# Patient Record
Sex: MALE | Race: WHITE
[De-identification: names, ages, dates, MRNs, and addresses within clinical notes are randomized per-mention and may not be internally consistent; named-entity substitution may affect disease eponyms.]

---

## 2017-09-20 ENCOUNTER — HOSPITAL ENCOUNTER (OUTPATIENT)
Dept: HOSPITAL 47 - EC | Age: 52
Setting detail: OBSERVATION
Discharge: LEFT BEFORE DISCHARGE | End: 2017-09-20
Attending: INTERNAL MEDICINE | Admitting: INTERNAL MEDICINE
Payer: COMMERCIAL

## 2017-09-20 VITALS
TEMPERATURE: 98.3 F | SYSTOLIC BLOOD PRESSURE: 142 MMHG | DIASTOLIC BLOOD PRESSURE: 88 MMHG | RESPIRATION RATE: 16 BRPM | HEART RATE: 98 BPM

## 2017-09-20 DIAGNOSIS — F17.210: ICD-10-CM

## 2017-09-20 DIAGNOSIS — J45.909: Primary | ICD-10-CM

## 2017-09-20 DIAGNOSIS — J20.9: ICD-10-CM

## 2017-09-20 LAB
ALP SERPL-CCNC: 79 U/L (ref 38–126)
ALT SERPL-CCNC: 55 U/L (ref 21–72)
ANION GAP SERPL CALC-SCNC: 10 MMOL/L
APTT BLD: 24.5 SEC (ref 22–30)
AST SERPL-CCNC: 61 U/L (ref 17–59)
BASOPHILS # BLD AUTO: 0 K/UL (ref 0–0.2)
BASOPHILS NFR BLD AUTO: 0 %
BUN SERPL-SCNC: 9 MG/DL (ref 9–20)
CALCIUM SPEC-MCNC: 9.6 MG/DL (ref 8.4–10.2)
CH: 33.1
CHCM: 35.4
CHLORIDE SERPL-SCNC: 104 MMOL/L (ref 98–107)
CK SERPL-CCNC: 475 U/L (ref 55–170)
CO2 SERPL-SCNC: 26 MMOL/L (ref 22–30)
EOSINOPHIL # BLD AUTO: 0.2 K/UL (ref 0–0.7)
EOSINOPHIL NFR BLD AUTO: 3 %
ERYTHROCYTE [DISTWIDTH] IN BLOOD BY AUTOMATED COUNT: 5.18 M/UL (ref 4.3–5.9)
ERYTHROCYTE [DISTWIDTH] IN BLOOD: 13.3 % (ref 11.5–15.5)
GLUCOSE SERPL-MCNC: 95 MG/DL (ref 74–99)
HCT VFR BLD AUTO: 48.6 % (ref 39–53)
HDW: 2.35
HGB BLD-MCNC: 17 GM/DL (ref 13–17.5)
INR PPP: 0.9 (ref ?–1.2)
LUC NFR BLD AUTO: 1 %
LYMPHOCYTES # SPEC AUTO: 0.6 K/UL (ref 1–4.8)
LYMPHOCYTES NFR SPEC AUTO: 9 %
MCH RBC QN AUTO: 32.9 PG (ref 25–35)
MCHC RBC AUTO-ENTMCNC: 35 G/DL (ref 31–37)
MCV RBC AUTO: 93.9 FL (ref 80–100)
MONOCYTES # BLD AUTO: 0.4 K/UL (ref 0–1)
MONOCYTES NFR BLD AUTO: 6 %
NEUTROPHILS # BLD AUTO: 5.6 K/UL (ref 1.3–7.7)
NEUTROPHILS NFR BLD AUTO: 80 %
NON-AFRICAN AMERICAN GFR(MDRD): >60
POTASSIUM SERPL-SCNC: 4.4 MMOL/L (ref 3.5–5.1)
PROT SERPL-MCNC: 7.4 G/DL (ref 6.3–8.2)
PT BLD: 9.5 SEC (ref 9–12)
SODIUM SERPL-SCNC: 140 MMOL/L (ref 137–145)
TROPONIN I SERPL-MCNC: <0.012 NG/ML (ref 0–0.03)
WBC # BLD AUTO: 0.09 10*3/UL
WBC # BLD AUTO: 7 K/UL (ref 3.8–10.6)
WBC (PEROX): 7.28

## 2017-09-20 PROCEDURE — 84484 ASSAY OF TROPONIN QUANT: CPT

## 2017-09-20 PROCEDURE — 87040 BLOOD CULTURE FOR BACTERIA: CPT

## 2017-09-20 PROCEDURE — 94640 AIRWAY INHALATION TREATMENT: CPT

## 2017-09-20 PROCEDURE — 80053 COMPREHEN METABOLIC PANEL: CPT

## 2017-09-20 PROCEDURE — 99285 EMERGENCY DEPT VISIT HI MDM: CPT

## 2017-09-20 PROCEDURE — 83880 ASSAY OF NATRIURETIC PEPTIDE: CPT

## 2017-09-20 PROCEDURE — 82550 ASSAY OF CK (CPK): CPT

## 2017-09-20 PROCEDURE — 85610 PROTHROMBIN TIME: CPT

## 2017-09-20 PROCEDURE — 71020: CPT

## 2017-09-20 PROCEDURE — 85379 FIBRIN DEGRADATION QUANT: CPT

## 2017-09-20 PROCEDURE — 93005 ELECTROCARDIOGRAM TRACING: CPT

## 2017-09-20 PROCEDURE — 85730 THROMBOPLASTIN TIME PARTIAL: CPT

## 2017-09-20 PROCEDURE — 82553 CREATINE MB FRACTION: CPT

## 2017-09-20 PROCEDURE — 85025 COMPLETE CBC W/AUTO DIFF WBC: CPT

## 2017-09-20 PROCEDURE — 36415 COLL VENOUS BLD VENIPUNCTURE: CPT

## 2017-09-20 PROCEDURE — 96374 THER/PROPH/DIAG INJ IV PUSH: CPT

## 2017-09-20 PROCEDURE — 96361 HYDRATE IV INFUSION ADD-ON: CPT

## 2017-09-20 RX ADMIN — IPRATROPIUM BROMIDE AND ALBUTEROL SULFATE PRN ML: .5; 3 SOLUTION RESPIRATORY (INHALATION) at 16:56

## 2017-09-20 RX ADMIN — IPRATROPIUM BROMIDE AND ALBUTEROL SULFATE PRN ML: .5; 3 SOLUTION RESPIRATORY (INHALATION) at 14:06

## 2017-09-20 NOTE — XR
EXAMINATION TYPE: XR chest 2V

 

DATE OF EXAM: 9/20/2017

 

COMPARISON: 5/6/2012

 

HISTORY: Shortness of breath

 

TECHNIQUE:  Frontal and lateral views of the chest are obtained.

 

FINDINGS:

 

Scattered senescent parenchymal changes noted. Hyperinflation compatible with COPD. 

 

No evidence for infiltrate. No evidence for atelectasis.

 

Heart size is stable.

 

Mediastinal structures are stable and grossly unremarkable.

 

No evidence for hilar prominence.

 

Degenerative changes dorsal spine. 

 

IMPRESSION:

1. No evidence for acute pulmonary disease.

## 2017-09-20 NOTE — ED
General Adult HPI





- General


Chief complaint: Shortness of Breath


Stated complaint: NINO


Time Seen by Provider: 09/20/17 07:15


Source: patient, RN notes reviewed


Mode of arrival: wheelchair


Limitations: no limitations





- History of Present Illness


Initial comments: 





52-year-old male with no significant past medical history presents with 2 day 

history of cough and difficult breathing.  Patient states that this morning he 

became significantly more short of breath.  He also reports some anterior chest 

tightness.  Patient has no known history of asthma or COPD.  He is a long-time 

smoker smokes approximately three quarters of a pack cigarettes daily.  Denies 

fever or chills.  Denies any radiating chest pain.  Cough is been 

nonproductive.  No gallop pain.  No nausea vomiting or diarrhea.





- Related Data


 Home Medications











 Medication  Instructions  Recorded  Confirmed


 


Dm/Acetaminophen/Doxylamine [Vicks 2 cap PO HS PRN 09/20/17 09/20/17





Nyquil Liquicaps]   











 Allergies











Allergy/AdvReac Type Severity Reaction Status Date / Time


 


No Known Allergies Allergy   Verified 09/20/17 07:30














Review of Systems


ROS Statement: 


Those systems with pertinent positive or pertinent negative responses have been 

documented in the HPI.





ROS Other: All systems not noted in ROS Statement are negative.





Past Medical History


Past Medical History: COPD


History of Any Multi-Drug Resistant Organisms: None Reported


Past Surgical History: Hernia Repair


Past Psychological History: No Psychological Hx Reported


Smoking Status: Current every day smoker


Past Alcohol Use History: Daily


Past Drug Use History: Marijuana





General Exam


Limitations: no limitations


General appearance: alert, in distress


Head exam: Present: atraumatic, normocephalic


Eye exam: Present: normal appearance, PERRL


ENT exam: Present: mucous membranes dry


Neck exam: Present: full ROM.  Absent: meningismus


Respiratory exam: Present: respiratory distress, wheezes, rhonchi, prolonged 

expiratory


Cardiovascular Exam: Present: normal rhythm, tachycardia


GI/Abdominal exam: Present: soft.  Absent: distended, tenderness


Extremities exam: Present: normal inspection, normal capillary refill.  Absent: 

pedal edema, calf tenderness


Back exam: Present: normal inspection, full ROM.  Absent: tenderness


Neurological exam: Present: alert, oriented X3


Psychiatric exam: Present: normal affect, normal mood


Skin exam: Present: warm, dry, intact.  Absent: cyanosis, diaphoretic





Course


 Vital Signs











  09/20/17 09/20/17 09/20/17





  07:11 07:31 07:49


 


Temperature 98.2 F  


 


Pulse Rate 115 H 109 H 101 H


 


Respiratory 26 H  24





Rate   


 


Blood Pressure 168/121  152/89


 


O2 Sat by Pulse 94 L  100





Oximetry   














  09/20/17 09/20/17





  07:59 08:17


 


Temperature  


 


Pulse Rate 110 H 108 H


 


Respiratory  20





Rate  


 


Blood Pressure  164/94


 


O2 Sat by Pulse  99





Oximetry  














- Reevaluation(s)


Reevaluation #1: 





09/20/17 08:57


On reevaluation, respiratory status has improved, however patient still has 

accessory muscle use, and end expiratory wheeze.





EKG Findings





- EKG Comments:


EKG Findings:: EKG shows sinus tachycardia with a ventricular rate of 111, NV 

interval 136, QRS duration 84, , no ST segment elevation or depression, 

no T-wave abnormality





Medical Decision Making





- Medical Decision Making





52-year-old male no history of asthma or COPD presenting with cough and 

dyspnea.  Patient has bilateral end expiratory wheeze, scattered rhonchi, 

accessory muscle use.  He is given albuterol Atrovent, steroids in the 

emergency department.  Chest x-ray is obtained, negative for focal pneumonia.  

On reevaluation after nebulized treatment patient still has accessory muscle 

use and proximal spastic cough as well as an expiratory wheeze.  He will be 

placed in observation for continued treatment.








Diagnosis: Acute bronchitis, reactive airway disease, tobacco use





- Lab Data


Result diagrams: 


 09/20/17 07:15





 09/20/17 07:15


 Lab Results











  09/20/17 09/20/17 09/20/17 Range/Units





  07:15 07:15 07:15 


 


WBC   7.0   (3.8-10.6)  k/uL


 


RBC   5.18   (4.30-5.90)  m/uL


 


Hgb   17.0   (13.0-17.5)  gm/dL


 


Hct   48.6   (39.0-53.0)  %


 


MCV   93.9   (80.0-100.0)  fL


 


MCH   32.9   (25.0-35.0)  pg


 


MCHC   35.0   (31.0-37.0)  g/dL


 


RDW   13.3   (11.5-15.5)  %


 


Plt Count   211   (150-450)  k/uL


 


Neutrophils %   80   %


 


Lymphocytes %   9   %


 


Monocytes %   6   %


 


Eosinophils %   3   %


 


Basophils %   0   %


 


Neutrophils #   5.6   (1.3-7.7)  k/uL


 


Lymphocytes #   0.6 L   (1.0-4.8)  k/uL


 


Monocytes #   0.4   (0-1.0)  k/uL


 


Eosinophils #   0.2   (0-0.7)  k/uL


 


Basophils #   0.0   (0-0.2)  k/uL


 


PT     (9.0-12.0)  sec


 


INR     (<1.2)  


 


APTT     (22.0-30.0)  sec


 


D-Dimer     (<0.60)  mg/L FEU


 


Sodium    140  (137-145)  mmol/L


 


Potassium    4.4  (3.5-5.1)  mmol/L


 


Chloride    104  ()  mmol/L


 


Carbon Dioxide    26  (22-30)  mmol/L


 


Anion Gap    10  mmol/L


 


BUN    9  (9-20)  mg/dL


 


Creatinine    0.81  (0.66-1.25)  mg/dL


 


Est GFR (MDRD) Af Amer    >60  (>60 ml/min/1.73 sqM)  


 


Est GFR (MDRD) Non-Af    >60  (>60 ml/min/1.73 sqM)  


 


Glucose    95  (74-99)  mg/dL


 


Calcium    9.6  (8.4-10.2)  mg/dL


 


Total Bilirubin    0.4  (0.2-1.3)  mg/dL


 


AST    61 H  (17-59)  U/L


 


ALT    55  (21-72)  U/L


 


Alkaline Phosphatase    79  ()  U/L


 


Total Creatine Kinase  475 H    ()  U/L


 


CK-MB (CK-2)  8.7 H*    (0.0-2.4)  ng/mL


 


CK-MB (CK-2) Rel Index  1.8    


 


Troponin I  <0.012    (0.000-0.034)  ng/mL


 


NT-Pro-B Natriuret Pep     pg/mL


 


Total Protein    7.4  (6.3-8.2)  g/dL


 


Albumin    4.6  (3.5-5.0)  g/dL














  09/20/17 09/20/17 Range/Units





  07:15 07:15 


 


WBC    (3.8-10.6)  k/uL


 


RBC    (4.30-5.90)  m/uL


 


Hgb    (13.0-17.5)  gm/dL


 


Hct    (39.0-53.0)  %


 


MCV    (80.0-100.0)  fL


 


MCH    (25.0-35.0)  pg


 


MCHC    (31.0-37.0)  g/dL


 


RDW    (11.5-15.5)  %


 


Plt Count    (150-450)  k/uL


 


Neutrophils %    %


 


Lymphocytes %    %


 


Monocytes %    %


 


Eosinophils %    %


 


Basophils %    %


 


Neutrophils #    (1.3-7.7)  k/uL


 


Lymphocytes #    (1.0-4.8)  k/uL


 


Monocytes #    (0-1.0)  k/uL


 


Eosinophils #    (0-0.7)  k/uL


 


Basophils #    (0-0.2)  k/uL


 


PT  9.5   (9.0-12.0)  sec


 


INR  0.9   (<1.2)  


 


APTT  24.5   (22.0-30.0)  sec


 


D-Dimer  0.29   (<0.60)  mg/L FEU


 


Sodium    (137-145)  mmol/L


 


Potassium    (3.5-5.1)  mmol/L


 


Chloride    ()  mmol/L


 


Carbon Dioxide    (22-30)  mmol/L


 


Anion Gap    mmol/L


 


BUN    (9-20)  mg/dL


 


Creatinine    (0.66-1.25)  mg/dL


 


Est GFR (MDRD) Af Amer    (>60 ml/min/1.73 sqM)  


 


Est GFR (MDRD) Non-Af    (>60 ml/min/1.73 sqM)  


 


Glucose    (74-99)  mg/dL


 


Calcium    (8.4-10.2)  mg/dL


 


Total Bilirubin    (0.2-1.3)  mg/dL


 


AST    (17-59)  U/L


 


ALT    (21-72)  U/L


 


Alkaline Phosphatase    ()  U/L


 


Total Creatine Kinase    ()  U/L


 


CK-MB (CK-2)    (0.0-2.4)  ng/mL


 


CK-MB (CK-2) Rel Index    


 


Troponin I    (0.000-0.034)  ng/mL


 


NT-Pro-B Natriuret Pep   78  pg/mL


 


Total Protein    (6.3-8.2)  g/dL


 


Albumin    (3.5-5.0)  g/dL














Disposition


Clinical Impression: 


 Reactive airway disease





Disposition: ADMITTED AS IP TO THIS [facility]


Referrals: 


None,Stated [Primary Care Provider] - 1-2 days


Decision to Admit Reason: Admit from EC


Decision Date: 09/20/17


Decision Time: 08:58

## 2017-11-17 NOTE — P.DS
Providers


Date of admission: 


09/20/17 08:55





Expected date of discharge: 09/20/17


Attending physician: 


Kennedy Mejía





Primary care physician: 


Stated None





Hospital Course: 





Discharge diagnosis


#1 acute bronchospasm.  Suspected underlying COPD with exacerbation





Hospital course


52-year-old male with no significant past medical history presents to the ER 

with complaints of shortness of breath and cough worsening for the past 2 days.

  Patient otherwise denied any sputum production.  No complaints of chest pain 

patient does have chest tightness.  Patient continues to smoke on a daily 

basis.  Denied any nausea vomiting or abdominal pain no fever no chills.  No 

recent illnesses or sick contacts.  No recent travel.





Patient was continued on DuoNeb's, prednisone and antibiotics in the form of 

levofloxacin.   Patient was advised to follow with pulmonary clinic upon 

discharge for pulmonary function tests.  We will continue to monitor the 

patient and further recommendations as to on the clinical course.





Patient left AMA





Patient Condition at Discharge: Fair





Plan - Discharge Summary


New Discharge Prescriptions: 


No Action


   Dm/Acetaminophen/Doxylamine [Vicks Nyquil Liquicaps] 2 cap PO HS PRN


     PRN Reason: Cold Symptoms


Discharge Medication List





Dm/Acetaminophen/Doxylamine [Vicks Nyquil Liquicaps] 2 cap PO HS PRN 09/20/17 [

History]








Follow up Appointment(s)/Referral(s): 


None,Stated [Primary Care Provider] - 1-2 days


Discharge Disposition: Left Against Medical Advice

## 2017-11-17 NOTE — P.HPIM
History of Present Illness


H&P Date: 17


Chief Complaint: Shortness of breath








52-year-old male with no significant past medical history presents to the ER 

with complaints of shortness of breath and cough worsening for the past 2 days.

  Patient otherwise denied any sputum production.  No complaints of chest pain 

patient does have chest tightness.  Patient continues to smoke on a daily 

basis.  Denied any nausea vomiting or abdominal pain no fever no chills.  No 

recent illnesses or sick contacts.  No recent travel.





Review of Systems





Constitutional: Patient denies any fever or chills .  No generalized weakness 

or weight loss.  


Abdomen: Patient denied nausea vomiting and diarrhea and abdominal pain.


Cardiovascular: Patient denies any chest pain or short of breath no 

palpitations.


Respiratory: He does have cough without sputum production.  Shortness of breath.


Neurologic: Patient denied any numbness or tingling headache.


Musculoskeletal: Patient denies any complaints of joint swelling or deformity.


Skin: Negative


Psychiatric: Negative


Endocrine: No heat or cold intolerance.  No recent weight gain.


Genitourinary: No dysuria or hematuria.


All other 14 point ROS negative except the above





Past Medical History


Past Medical History: COPD


History of Any Multi-Drug Resistant Organisms: None Reported


Past Surgical History: Hernia Repair


Additional Past Surgical History / Comment(s): Bilateral inguinal hernia 

repairs.


Past Anesthesia/Blood Transfusion Reactions: No Reported Reaction


Smoking Status: Current every day smoker





- Past Family History


  ** Mother


Family Medical History: Diabetes Mellitus


Additional Family Medical History / Comment(s): Mother has type II diabetes and 

trigeminal neuralgia





  ** Father


Family Medical History: Coronary Artery Disease (CAD)


Additional Family Medical History / Comment(s): Father had CABG and a mitral 

valve replaced.  He is .





Medications and Allergies


 Home Medications











 Medication  Instructions  Recorded  Confirmed  Type


 


Dm/Acetaminophen/Doxylamine [Vicks 2 cap PO HS PRN 17 History





Nyquil Liquicaps]    











 Allergies











Allergy/AdvReac Type Severity Reaction Status Date / Time


 


No Known Allergies Allergy   Verified 17 07:30














Physical Exam


Vitals: 


 Vital Signs











  Temp Pulse Resp BP Pulse Ox


 


 17 11:12  98.7 F  90  18  144/98  95


 


 17 09:47  98.4 F  96  20  137/88  95


 


 17 08:17   108 H  20  164/94  99


 


 17 07:59   110 H   


 


 17 07:49   101 H  24  152/89  100


 


 17 07:31   109 H   


 


 17 07:11  98.2 F  115 H  26 H  168/121  94 L








 Intake and Output











 17





 22:59 06:59 14:59


 


Other:   


 


  Weight   79.379 kg








 Patient Weight











 17





 06:59


 


Weight 79.379 kg














PHYSICAL EXAMINATION: 


Patient is lying in the bed comfortably, no acute distress, awake alert and 

oriented.. 


HEENT: Normocephalic. Neck is supple. Pupils reactive. Nostrils clear. Oral 

cavity is moist. Ears reveal no drainage. 


Neck reveals no JVD, carotid bruits, or thyromegaly. 


CHEST EXAMINATION: Trachea is central. Symmetrical expansion.  Bilateral air 

entry diminished with expiratory wheezing 


CARDIAC: Normal S1, S2 with no gallops. No murmurs 


ABDOMEN: Soft. Bowel sounds normal. No organomegaly. No abdominal bruits. 


Extremities: reveal no edema.  No clubbing or cyanosis


Neurologically awake, alert, oriented x3 with well-coordinated movements.  No 

focal deficits noted


Skin: No rash or skin lesions. 


Psychiatric: Operative.  Nonsuicidal


Musculoskeletal: No joint swelling or deformity.  Normal range of motion.








Results


CBC & Chem 7: 


 17 07:15





 17 07:15


Labs: 


 Abnormal Lab Results - Last 24 Hours (Table)











  17 Range/Units





  07:15 07:15 07:15 


 


Lymphocytes #   0.6 L   (1.0-4.8)  k/uL


 


AST    61 H  (17-59)  U/L


 


Total Creatine Kinase  475 H    ()  U/L


 


CK-MB (CK-2)  8.7 H*    (0.0-2.4)  ng/mL














Thrombosis Risk Factor Assmnt





- Choose All That Apply


Any of the Below Risk Factors Present?: Yes


Each Factor Represents 1 point: Age 41-60 years, Obesity (BMI >25)


Other Risk Factors: No


Other congenital or acquired thrombophilia - If yes, enter type in comment: No


Thrombosis Risk Factor Assessment Total Risk Factor Score: 2


Thrombosis Risk Factor Assessment Level: Low Risk





Assessment and Plan


Assessment: 





#1 acute bronchospasm.  Suspected underlying COPD with exacerbation





Plan:


Patient will be continued on DuoNeb's, prednisone and antibiotics in the form 

of levofloxacin.  We will continue to monitor closely.  Patient was advised to 

follow with pulmonary clinic upon discharge for pulmonary function tests.  We 

will continue to monitor the patient and further recommendations as to on the 

clinical course.

## 2022-05-11 ENCOUNTER — HOSPITAL ENCOUNTER (INPATIENT)
Dept: HOSPITAL 47 - EC | Age: 57
LOS: 15 days | Discharge: HOME | DRG: 870 | End: 2022-05-26
Attending: HOSPITALIST | Admitting: HOSPITALIST
Payer: COMMERCIAL

## 2022-05-11 VITALS — BODY MASS INDEX: 20.5 KG/M2

## 2022-05-11 DIAGNOSIS — J44.0: ICD-10-CM

## 2022-05-11 DIAGNOSIS — G93.41: ICD-10-CM

## 2022-05-11 DIAGNOSIS — J96.02: ICD-10-CM

## 2022-05-11 DIAGNOSIS — E86.1: ICD-10-CM

## 2022-05-11 DIAGNOSIS — E87.2: ICD-10-CM

## 2022-05-11 DIAGNOSIS — E11.9: ICD-10-CM

## 2022-05-11 DIAGNOSIS — J45.909: ICD-10-CM

## 2022-05-11 DIAGNOSIS — J13: ICD-10-CM

## 2022-05-11 DIAGNOSIS — J44.1: ICD-10-CM

## 2022-05-11 DIAGNOSIS — E83.42: ICD-10-CM

## 2022-05-11 DIAGNOSIS — Z98.890: ICD-10-CM

## 2022-05-11 DIAGNOSIS — Z20.822: ICD-10-CM

## 2022-05-11 DIAGNOSIS — F10.231: ICD-10-CM

## 2022-05-11 DIAGNOSIS — T42.4X5A: ICD-10-CM

## 2022-05-11 DIAGNOSIS — Z83.3: ICD-10-CM

## 2022-05-11 DIAGNOSIS — E87.1: ICD-10-CM

## 2022-05-11 DIAGNOSIS — Z78.1: ICD-10-CM

## 2022-05-11 DIAGNOSIS — A41.89: Primary | ICD-10-CM

## 2022-05-11 DIAGNOSIS — F17.210: ICD-10-CM

## 2022-05-11 DIAGNOSIS — Z82.49: ICD-10-CM

## 2022-05-11 DIAGNOSIS — R65.20: ICD-10-CM

## 2022-05-11 DIAGNOSIS — J10.08: ICD-10-CM

## 2022-05-11 DIAGNOSIS — Z79.899: ICD-10-CM

## 2022-05-11 DIAGNOSIS — R56.9: ICD-10-CM

## 2022-05-11 DIAGNOSIS — Z87.01: ICD-10-CM

## 2022-05-11 LAB
APTT BLD: 27.7 SEC (ref 22–30)
ERYTHROCYTE [DISTWIDTH] IN BLOOD BY AUTOMATED COUNT: 3.39 M/UL (ref 4.3–5.9)
ERYTHROCYTE [DISTWIDTH] IN BLOOD: 15.7 % (ref 11.5–15.5)
HCT VFR BLD AUTO: 40.5 % (ref 39–53)
HGB BLD-MCNC: 14.3 GM/DL (ref 13–17.5)
INR PPP: 1 (ref ?–1.2)
MCH RBC QN AUTO: 42.3 PG (ref 25–35)
MCHC RBC AUTO-ENTMCNC: 35.4 G/DL (ref 31–37)
MCV RBC AUTO: 119.4 FL (ref 80–100)
PLATELET # BLD AUTO: 142 K/UL (ref 150–450)
PT BLD: 10.8 SEC (ref 9–12)
WBC # BLD AUTO: 5 K/UL (ref 3.8–10.6)

## 2022-05-11 PROCEDURE — 87070 CULTURE OTHR SPECIMN AEROBIC: CPT

## 2022-05-11 PROCEDURE — 85730 THROMBOPLASTIN TIME PARTIAL: CPT

## 2022-05-11 PROCEDURE — 94003 VENT MGMT INPAT SUBQ DAY: CPT

## 2022-05-11 PROCEDURE — 84484 ASSAY OF TROPONIN QUANT: CPT

## 2022-05-11 PROCEDURE — 96376 TX/PRO/DX INJ SAME DRUG ADON: CPT

## 2022-05-11 PROCEDURE — 80048 BASIC METABOLIC PNL TOTAL CA: CPT

## 2022-05-11 PROCEDURE — 85027 COMPLETE CBC AUTOMATED: CPT

## 2022-05-11 PROCEDURE — 83735 ASSAY OF MAGNESIUM: CPT

## 2022-05-11 PROCEDURE — 87635 SARS-COV-2 COVID-19 AMP PRB: CPT

## 2022-05-11 PROCEDURE — 93005 ELECTROCARDIOGRAM TRACING: CPT

## 2022-05-11 PROCEDURE — 71045 X-RAY EXAM CHEST 1 VIEW: CPT

## 2022-05-11 PROCEDURE — 87324 CLOSTRIDIUM AG IA: CPT

## 2022-05-11 PROCEDURE — 96361 HYDRATE IV INFUSION ADD-ON: CPT

## 2022-05-11 PROCEDURE — 87502 INFLUENZA DNA AMP PROBE: CPT

## 2022-05-11 PROCEDURE — 84145 PROCALCITONIN (PCT): CPT

## 2022-05-11 PROCEDURE — 84443 ASSAY THYROID STIM HORMONE: CPT

## 2022-05-11 PROCEDURE — 87040 BLOOD CULTURE FOR BACTERIA: CPT

## 2022-05-11 PROCEDURE — 80053 COMPREHEN METABOLIC PANEL: CPT

## 2022-05-11 PROCEDURE — 36600 WITHDRAWAL OF ARTERIAL BLOOD: CPT

## 2022-05-11 PROCEDURE — 87186 SC STD MICRODIL/AGAR DIL: CPT

## 2022-05-11 PROCEDURE — 94640 AIRWAY INHALATION TREATMENT: CPT

## 2022-05-11 PROCEDURE — 94760 N-INVAS EAR/PLS OXIMETRY 1: CPT

## 2022-05-11 PROCEDURE — 94002 VENT MGMT INPAT INIT DAY: CPT

## 2022-05-11 PROCEDURE — 96375 TX/PRO/DX INJ NEW DRUG ADDON: CPT

## 2022-05-11 PROCEDURE — 84132 ASSAY OF SERUM POTASSIUM: CPT

## 2022-05-11 PROCEDURE — 85610 PROTHROMBIN TIME: CPT

## 2022-05-11 PROCEDURE — 83605 ASSAY OF LACTIC ACID: CPT

## 2022-05-11 PROCEDURE — 87205 SMEAR GRAM STAIN: CPT

## 2022-05-11 PROCEDURE — 96366 THER/PROPH/DIAG IV INF ADDON: CPT

## 2022-05-11 PROCEDURE — 87077 CULTURE AEROBIC IDENTIFY: CPT

## 2022-05-11 PROCEDURE — 36415 COLL VENOUS BLD VENIPUNCTURE: CPT

## 2022-05-11 PROCEDURE — 96365 THER/PROPH/DIAG IV INF INIT: CPT

## 2022-05-11 PROCEDURE — 83880 ASSAY OF NATRIURETIC PEPTIDE: CPT

## 2022-05-11 PROCEDURE — 96372 THER/PROPH/DIAG INJ SC/IM: CPT

## 2022-05-11 PROCEDURE — 99291 CRITICAL CARE FIRST HOUR: CPT

## 2022-05-11 PROCEDURE — 84100 ASSAY OF PHOSPHORUS: CPT

## 2022-05-11 PROCEDURE — 82805 BLOOD GASES W/O2 SATURATION: CPT

## 2022-05-11 PROCEDURE — 86140 C-REACTIVE PROTEIN: CPT

## 2022-05-11 PROCEDURE — 85025 COMPLETE CBC W/AUTO DIFF WBC: CPT

## 2022-05-11 NOTE — ED
SOB HPI





- General


Chief Complaint: Shortness of Breath


Stated Complaint: NINO


Time Seen by Provider: 22 23:01


Source: patient, RN notes reviewed, old records reviewed


Mode of arrival: wheelchair


Limitations: no limitations





- History of Present Illness


Initial Comments: 





Is a 57-year-old male who does have history of COPD.  Patient presented today fo

r cough congestion fever mainly not feeling well.  He doesn't have any travel 

history sick contacts did for [place] today which was negative.  No real chest 

pain just shortness of breath cough congestion.  No travel history no 

significant sick contacts.  Patient has had rotavirus in the past.


MD Complaint: shortness of breath, cough, "asthma attack" (Fever)


-: days(s)


Severity: moderate


Severity scale (1-10): 7


Quality: aching


Consistency: constant


Improves With: nothing


Worsens With: exertion, movement


Known History Of: COPD, asthma, recurrent pneumonia


Context: recent URI, anxiety, recent illness


Associated Symptoms: pain with inspiration, fever, cough, sputum production


Treatments Prior to Arrival: none





- Related Data


                                Home Medications











 Medication  Instructions  Recorded  Confirmed


 


Dm/Acetaminophen/Doxylamine [Vicks 2 cap PO HS PRN 17





Nyquil Liquicaps]   











                                    Allergies











Allergy/AdvReac Type Severity Reaction Status Date / Time


 


No Known Allergies Allergy   Verified 22 22:53














Review of Systems


ROS Statement: 


Those systems with pertinent positive or pertinent negative responses have been 

documented in the HPI.





ROS Other: All systems not noted in ROS Statement are negative.





Past Medical History


Past Medical History: COPD


History of Any Multi-Drug Resistant Organisms: None Reported


Past Surgical History: Hernia Repair


Additional Past Surgical History / Comment(s): Bilateral inguinal hernia 

repairs.


Past Anesthesia/Blood Transfusion Reactions: No Reported Reaction


Past Psychological History: No Psychological Hx Reported


Smoking Status: Current every day smoker


Past Alcohol Use History: Daily


Past Drug Use History: Marijuana





- Past Family History


  ** Mother


Family Medical History: Diabetes Mellitus


Additional Family Medical History / Comment(s): Mother has type II diabetes and 

trigeminal neuralgia





  ** Father


Family Medical History: Coronary Artery Disease (CAD)


Additional Family Medical History / Comment(s): Father had CABG and a mitral 

valve replaced.  He is .





General Exam


Limitations: no limitations


General appearance: alert, in no apparent distress, anxious


Head exam: Present: atraumatic, normocephalic, normal inspection


Eye exam: Present: normal appearance, PERRL, EOMI.  Absent: scleral icterus, 

conjunctival injection, periorbital swelling


ENT exam: Present: normal exam, mucous membranes dry


Neck exam: Present: normal inspection.  Absent: tenderness, meningismus, lym

phadenopathy


Respiratory exam: Present: respiratory distress, wheezes, accessory muscle use, 

decreased breath sounds, prolonged expiratory.  Absent: rales, rhonchi, stridor


Cardiovascular Exam: Present: normal rhythm, tachycardia, normal heart sounds.  

Absent: systolic murmur, diastolic murmur, rubs, gallop, clicks


GI/Abdominal exam: Present: soft, normal bowel sounds.  Absent: distended, 

tenderness, guarding, rebound, rigid


Extremities exam: Present: normal inspection, full ROM, normal capillary refill.

 Absent: tenderness, pedal edema, joint swelling, calf tenderness


Back exam: Present: normal inspection


Neurological exam: Present: alert, oriented X3, CN II-XII intact


Psychiatric exam: Present: normal affect, normal mood


Skin exam: Present: warm, dry, intact, normal color.  Absent: rash





Course


                                   Vital Signs











  22





  22:48 00:20 00:25


 


Temperature 103 F H  


 


Pulse Rate 154 H 140 H 138 H


 


Respiratory 24  





Rate   


 


Blood Pressure 121/74  


 


O2 Sat by Pulse 93 L  





Oximetry   














- Reevaluation(s)


Reevaluation #1: 





22 23:40


Medical record is reviewed


Reevaluation #2: 





22 00:55


Patient feeling improved here in the emergency department


Reevaluation #3: 





22 00:56


Patient informed results and questions answered





- Consultations


Consultation #1: 





Spoke with sound who agrees to admit this patient





Medical Decision Making





- Medical Decision Making





57 male in mild distress with fever positive for influenza COPD exacerbation and

impending alcohol withdrawal.  Patient will be admitted for monitoring of the 

above, supportive care also hypomagnesemia and replacement





- Lab Data


Result diagrams: 


                                 22 23:17





                                 22 23:17


                                   Lab Results











  22 Range/Units





  22:56 22:56 23:17 


 


WBC    5.0  (3.8-10.6)  k/uL


 


RBC    3.39 L  (4.30-5.90)  m/uL


 


Hgb    14.3  (13.0-17.5)  gm/dL


 


Hct    40.5  (39.0-53.0)  %


 


MCV    119.4 H  (80.0-100.0)  fL


 


MCH    42.3 H  (25.0-35.0)  pg


 


MCHC    35.4  (31.0-37.0)  g/dL


 


RDW    15.7 H  (11.5-15.5)  %


 


Plt Count    142 L  (150-450)  k/uL


 


MPV    8.0  


 


Neutrophils % (Manual)    82  %


 


Band Neuts % (Manual)    5  %


 


Lymphocytes % (Manual)    3  %


 


Monocytes % (Manual)    9  %


 


Basophils % (Manual)    1  %


 


Neutrophils # (Manual)    4.30  (1.3-7.7)  k/uL


 


Lymphocytes # (Manual)    0.15 L  (1.0-4.8)  k/uL


 


Monocytes # (Manual)    0.45  (0-1.0)  k/uL


 


Basophils # (Manual)    0.05  (0-0.2)  k/uL


 


Nucleated RBCs    0  (0-0)  /100 WBC


 


Manual Slide Review    Performed  


 


Poikilocytosis (manual    Present  


 


Macrocytosis    Marked A  


 


Stomatocytes    Present  


 


PT     (9.0-12.0)  sec


 


INR     (<1.2)  


 


APTT     (22.0-30.0)  sec


 


Sodium     (137-145)  mmol/L


 


Potassium     (3.5-5.1)  mmol/L


 


Chloride     ()  mmol/L


 


Carbon Dioxide     (22-30)  mmol/L


 


Anion Gap     mmol/L


 


BUN     (9-20)  mg/dL


 


Creatinine     (0.66-1.25)  mg/dL


 


Est GFR (CKD-EPI)AfAm     (>60 ml/min/1.73 sqM)  


 


Est GFR (CKD-EPI)NonAf     (>60 ml/min/1.73 sqM)  


 


Glucose     (74-99)  mg/dL


 


Plasma Lactic Acid Tyrel     (0.7-2.0)  mmol/L


 


Calcium     (8.4-10.2)  mg/dL


 


Magnesium     (1.6-2.3)  mg/dL


 


Total Bilirubin     (0.2-1.3)  mg/dL


 


AST     (17-59)  U/L


 


ALT     (4-49)  U/L


 


Alkaline Phosphatase     ()  U/L


 


Troponin I     (0.000-0.034)  ng/mL


 


C-Reactive Protein     (<1.0)  mg/dL


 


NT-Pro-B Natriuret Pep     pg/mL


 


Total Protein     (6.3-8.2)  g/dL


 


Albumin     (3.5-5.0)  g/dL


 


Coronavirus (PCR)   Not Detected   (Not Detectd)  


 


Influenza Type A RNA  Detected H    (Not Detectd)  


 


Influenza Type B (PCR)  Not Detected    (Not Detectd)  














  22 Range/Units





  23:17 23:17 23:17 


 


WBC     (3.8-10.6)  k/uL


 


RBC     (4.30-5.90)  m/uL


 


Hgb     (13.0-17.5)  gm/dL


 


Hct     (39.0-53.0)  %


 


MCV     (80.0-100.0)  fL


 


MCH     (25.0-35.0)  pg


 


MCHC     (31.0-37.0)  g/dL


 


RDW     (11.5-15.5)  %


 


Plt Count     (150-450)  k/uL


 


MPV     


 


Neutrophils % (Manual)     %


 


Band Neuts % (Manual)     %


 


Lymphocytes % (Manual)     %


 


Monocytes % (Manual)     %


 


Basophils % (Manual)     %


 


Neutrophils # (Manual)     (1.3-7.7)  k/uL


 


Lymphocytes # (Manual)     (1.0-4.8)  k/uL


 


Monocytes # (Manual)     (0-1.0)  k/uL


 


Basophils # (Manual)     (0-0.2)  k/uL


 


Nucleated RBCs     (0-0)  /100 WBC


 


Manual Slide Review     


 


Poikilocytosis (manual     


 


Macrocytosis     


 


Stomatocytes     


 


PT  10.8    (9.0-12.0)  sec


 


INR  1.0    (<1.2)  


 


APTT  27.7    (22.0-30.0)  sec


 


Sodium   127 L   (137-145)  mmol/L


 


Potassium   3.8   (3.5-5.1)  mmol/L


 


Chloride   93 L   ()  mmol/L


 


Carbon Dioxide   26   (22-30)  mmol/L


 


Anion Gap   8   mmol/L


 


BUN   15   (9-20)  mg/dL


 


Creatinine   0.87   (0.66-1.25)  mg/dL


 


Est GFR (CKD-EPI)AfAm   >90   (>60 ml/min/1.73 sqM)  


 


Est GFR (CKD-EPI)NonAf   >90   (>60 ml/min/1.73 sqM)  


 


Glucose   105 H   (74-99)  mg/dL


 


Plasma Lactic Acid Tyrel    1.6  (0.7-2.0)  mmol/L


 


Calcium   8.6   (8.4-10.2)  mg/dL


 


Magnesium   0.9 L*   (1.6-2.3)  mg/dL


 


Total Bilirubin   1.0   (0.2-1.3)  mg/dL


 


AST   119 H   (17-59)  U/L


 


ALT   38   (4-49)  U/L


 


Alkaline Phosphatase   122   ()  U/L


 


Troponin I     (0.000-0.034)  ng/mL


 


C-Reactive Protein   5.9 H   (<1.0)  mg/dL


 


NT-Pro-B Natriuret Pep     pg/mL


 


Total Protein   6.9   (6.3-8.2)  g/dL


 


Albumin   4.3   (3.5-5.0)  g/dL


 


Coronavirus (PCR)     (Not Detectd)  


 


Influenza Type A RNA     (Not Detectd)  


 


Influenza Type B (PCR)     (Not Detectd)  














  22 Range/Units





  23:17 23:17 


 


WBC    (3.8-10.6)  k/uL


 


RBC    (4.30-5.90)  m/uL


 


Hgb    (13.0-17.5)  gm/dL


 


Hct    (39.0-53.0)  %


 


MCV    (80.0-100.0)  fL


 


MCH    (25.0-35.0)  pg


 


MCHC    (31.0-37.0)  g/dL


 


RDW    (11.5-15.5)  %


 


Plt Count    (150-450)  k/uL


 


MPV    


 


Neutrophils % (Manual)    %


 


Band Neuts % (Manual)    %


 


Lymphocytes % (Manual)    %


 


Monocytes % (Manual)    %


 


Basophils % (Manual)    %


 


Neutrophils # (Manual)    (1.3-7.7)  k/uL


 


Lymphocytes # (Manual)    (1.0-4.8)  k/uL


 


Monocytes # (Manual)    (0-1.0)  k/uL


 


Basophils # (Manual)    (0-0.2)  k/uL


 


Nucleated RBCs    (0-0)  /100 WBC


 


Manual Slide Review    


 


Poikilocytosis (manual    


 


Macrocytosis    


 


Stomatocytes    


 


PT    (9.0-12.0)  sec


 


INR    (<1.2)  


 


APTT    (22.0-30.0)  sec


 


Sodium    (137-145)  mmol/L


 


Potassium    (3.5-5.1)  mmol/L


 


Chloride    ()  mmol/L


 


Carbon Dioxide    (22-30)  mmol/L


 


Anion Gap    mmol/L


 


BUN    (9-20)  mg/dL


 


Creatinine    (0.66-1.25)  mg/dL


 


Est GFR (CKD-EPI)AfAm    (>60 ml/min/1.73 sqM)  


 


Est GFR (CKD-EPI)NonAf    (>60 ml/min/1.73 sqM)  


 


Glucose    (74-99)  mg/dL


 


Plasma Lactic Acid Tyrel    (0.7-2.0)  mmol/L


 


Calcium    (8.4-10.2)  mg/dL


 


Magnesium    (1.6-2.3)  mg/dL


 


Total Bilirubin    (0.2-1.3)  mg/dL


 


AST    (17-59)  U/L


 


ALT    (4-49)  U/L


 


Alkaline Phosphatase    ()  U/L


 


Troponin I  0.023   (0.000-0.034)  ng/mL


 


C-Reactive Protein    (<1.0)  mg/dL


 


NT-Pro-B Natriuret Pep   250  pg/mL


 


Total Protein    (6.3-8.2)  g/dL


 


Albumin    (3.5-5.0)  g/dL


 


Coronavirus (PCR)    (Not Detectd)  


 


Influenza Type A RNA    (Not Detectd)  


 


Influenza Type B (PCR)    (Not Detectd)  














- EKG Data


-: EKG Interpreted by Me (EKG is sinus tachycardia 151  QRS 90 )





- Radiology Data


Radiology results: report reviewed (Chest x-rays negative for acute disease), 

image reviewed





Critical Care Time


Critical Care Time: Yes


Total Critical Care Time: 31





Disposition


Clinical Impression: 


 Reactive airway disease, Acute exacerbation of chronic obstructive pulmonary 

disease, Fever, Tachycardia, Hypomagnesemia, Influenza A, Weakness, Alcohol 

withdrawal, Sepsis





Disposition: ADMITTED AS IP TO THIS HOSP


Condition: Fair


Is patient prescribed a controlled substance at d/c from ED?: No


Referrals: 


None,Stated [Primary Care Provider] - 1-2 days

## 2022-05-11 NOTE — XR
EXAMINATION TYPE: XR chest 1V portable

 

DATE OF EXAM: 5/11/2022

 

COMPARISON: 9/20/2017

 

HISTORY: Short of breath

 

TECHNIQUE:

 

FINDINGS: Heart is normal. Lungs are clear of infiltrate. No heart failure. There are chest leads. Co
stophrenic angles are clear.

 

IMPRESSION: No active cardiopulmonary disease. No change.

## 2022-05-12 LAB
ALBUMIN SERPL-MCNC: 3.7 G/DL (ref 3.5–5)
ALBUMIN SERPL-MCNC: 4.3 G/DL (ref 3.5–5)
ALP SERPL-CCNC: 122 U/L (ref 38–126)
ALP SERPL-CCNC: 94 U/L (ref 38–126)
ALT SERPL-CCNC: 38 U/L (ref 4–49)
ALT SERPL-CCNC: 39 U/L (ref 4–49)
ANION GAP SERPL CALC-SCNC: 7 MMOL/L
ANION GAP SERPL CALC-SCNC: 8 MMOL/L
AST SERPL-CCNC: 119 U/L (ref 17–59)
AST SERPL-CCNC: 150 U/L (ref 17–59)
BASOPHILS # BLD AUTO: 0 K/UL (ref 0–0.2)
BASOPHILS # BLD MANUAL: 0.05 K/UL (ref 0–0.2)
BASOPHILS NFR BLD AUTO: 1 %
BUN SERPL-SCNC: 15 MG/DL (ref 9–20)
BUN SERPL-SCNC: 16 MG/DL (ref 9–20)
CALCIUM SPEC-MCNC: 7.2 MG/DL (ref 8.4–10.2)
CALCIUM SPEC-MCNC: 8.6 MG/DL (ref 8.4–10.2)
CELLS COUNTED: 100
CHLORIDE SERPL-SCNC: 100 MMOL/L (ref 98–107)
CHLORIDE SERPL-SCNC: 93 MMOL/L (ref 98–107)
CO2 BLDA-SCNC: 24 MMOL/L (ref 19–24)
CO2 SERPL-SCNC: 23 MMOL/L (ref 22–30)
CO2 SERPL-SCNC: 26 MMOL/L (ref 22–30)
EOSINOPHIL # BLD AUTO: 0 K/UL (ref 0–0.7)
EOSINOPHIL NFR BLD AUTO: 0 %
ERYTHROCYTE [DISTWIDTH] IN BLOOD BY AUTOMATED COUNT: 3.13 M/UL (ref 4.3–5.9)
ERYTHROCYTE [DISTWIDTH] IN BLOOD: 15.8 % (ref 11.5–15.5)
GLUCOSE BLD-MCNC: 165 MG/DL (ref 75–99)
GLUCOSE SERPL-MCNC: 105 MG/DL (ref 74–99)
GLUCOSE SERPL-MCNC: 147 MG/DL (ref 74–99)
HCO3 BLDA-SCNC: 22 MMOL/L (ref 21–25)
HCT VFR BLD AUTO: 37.8 % (ref 39–53)
HGB BLD-MCNC: 12.9 GM/DL (ref 13–17.5)
LYMPHOCYTES # BLD MANUAL: 0.15 K/UL (ref 1–4.8)
LYMPHOCYTES # SPEC AUTO: 0.4 K/UL (ref 1–4.8)
LYMPHOCYTES NFR SPEC AUTO: 8 %
MAGNESIUM SPEC-SCNC: 0.9 MG/DL (ref 1.6–2.3)
MAGNESIUM SPEC-SCNC: 1.9 MG/DL (ref 1.6–2.3)
MCH RBC QN AUTO: 41.2 PG (ref 25–35)
MCHC RBC AUTO-ENTMCNC: 34.1 G/DL (ref 31–37)
MCV RBC AUTO: 120.8 FL (ref 80–100)
MONOCYTES # BLD AUTO: 0.1 K/UL (ref 0–1)
MONOCYTES # BLD MANUAL: 0.45 K/UL (ref 0–1)
MONOCYTES NFR BLD AUTO: 2 %
NEUTROPHILS # BLD AUTO: 4.8 K/UL (ref 1.3–7.7)
NEUTROPHILS NFR BLD AUTO: 88 %
NEUTROPHILS NFR BLD MANUAL: 82 %
NEUTS SEG # BLD MANUAL: 4.3 K/UL (ref 1.3–7.7)
PCO2 BLDA: 59 MMHG (ref 35–45)
PH BLDA: 7.19 [PH] (ref 7.35–7.45)
PLATELET # BLD AUTO: 138 K/UL (ref 150–450)
PO2 BLDA: >400 MMHG (ref 83–108)
POTASSIUM SERPL-SCNC: 3.8 MMOL/L (ref 3.5–5.1)
POTASSIUM SERPL-SCNC: 4.2 MMOL/L (ref 3.5–5.1)
PROT SERPL-MCNC: 6.2 G/DL (ref 6.3–8.2)
PROT SERPL-MCNC: 6.9 G/DL (ref 6.3–8.2)
SODIUM SERPL-SCNC: 127 MMOL/L (ref 137–145)
SODIUM SERPL-SCNC: 130 MMOL/L (ref 137–145)
WBC # BLD AUTO: 5.5 K/UL (ref 3.8–10.6)

## 2022-05-12 RX ADMIN — IPRATROPIUM BROMIDE AND ALBUTEROL SULFATE SCH ML: .5; 3 SOLUTION RESPIRATORY (INHALATION) at 19:52

## 2022-05-12 RX ADMIN — MIDAZOLAM SCH: 5 INJECTION INTRAMUSCULAR; INTRAVENOUS at 09:25

## 2022-05-12 RX ADMIN — OSELTAMIVIR PHOSPHATE SCH MG: 75 CAPSULE ORAL at 20:36

## 2022-05-12 RX ADMIN — OSELTAMIVIR PHOSPHATE SCH: 75 CAPSULE ORAL at 09:18

## 2022-05-12 RX ADMIN — POTASSIUM CHLORIDE SCH MLS/HR: 14.9 INJECTION, SOLUTION INTRAVENOUS at 15:14

## 2022-05-12 RX ADMIN — IPRATROPIUM BROMIDE AND ALBUTEROL SULFATE SCH ML: .5; 3 SOLUTION RESPIRATORY (INHALATION) at 15:12

## 2022-05-12 RX ADMIN — MAGNESIUM SULFATE IN DEXTROSE SCH MLS/HR: 10 INJECTION, SOLUTION INTRAVENOUS at 01:11

## 2022-05-12 RX ADMIN — MAGNESIUM SULFATE IN DEXTROSE SCH MLS/HR: 10 INJECTION, SOLUTION INTRAVENOUS at 05:04

## 2022-05-12 RX ADMIN — IPRATROPIUM BROMIDE AND ALBUTEROL SULFATE SCH ML: .5; 3 SOLUTION RESPIRATORY (INHALATION) at 11:01

## 2022-05-12 RX ADMIN — Medication SCH MG: at 20:36

## 2022-05-12 RX ADMIN — POTASSIUM CHLORIDE SCH MLS/HR: 14.9 INJECTION, SOLUTION INTRAVENOUS at 01:14

## 2022-05-12 RX ADMIN — Medication SCH: at 18:50

## 2022-05-12 RX ADMIN — PANTOPRAZOLE SODIUM SCH MG: 40 INJECTION, POWDER, FOR SOLUTION INTRAVENOUS at 09:22

## 2022-05-12 RX ADMIN — MAGNESIUM SULFATE IN DEXTROSE SCH MLS/HR: 10 INJECTION, SOLUTION INTRAVENOUS at 06:09

## 2022-05-12 RX ADMIN — MAGNESIUM SULFATE IN DEXTROSE SCH MLS/HR: 10 INJECTION, SOLUTION INTRAVENOUS at 02:35

## 2022-05-12 RX ADMIN — MIDAZOLAM SCH MLS/HR: 5 INJECTION INTRAMUSCULAR; INTRAVENOUS at 04:59

## 2022-05-12 RX ADMIN — CHLORHEXIDINE GLUCONATE SCH ML: 1.2 RINSE ORAL at 20:36

## 2022-05-12 RX ADMIN — Medication SCH: at 09:18

## 2022-05-12 NOTE — P.CNPUL
History of Present Illness


Consult date: 22


Requesting physician: Parul Braun


Reason for consult: dyspnea, cough, hypoxemia


Chief complaint: Shortness of breath


History of present illness: 


 57-year-old male patient, current smoker, with history of COPD, EtOH and 

marijuana use, presented to the emergency department on 2022 for evaluat

ion of cough, congestion, dyspnea and fever.  Patient tested positive for 

influenza A, and tested negative for COVID-19 and influenza B.  He had a fever 

of 103 degrees Fahrenheit on presentation.  He was tachycardic with a rate of 

154, but in sinus mechanism.  Chest x-ray showed no active cardiopulmonary 

disease.  Patient was started on Tamiflu, IV steroids, and breathing treatments.

 However patient was starting to become agitated, and it was suspected that he 

was going through acute EtOH withdrawal, patient was given multiple doses of IV 

Ativan, to the point of unresponsiveness, and there was a concern about his 

airway protection and for that reason she was intubated and placed on mechanical

ventilator.  He was started on propofol and Versed infusion.  He is awaiting a 

bed in the ICU, he seen in the emergency department.  He is sedated and 

intubated, he is on assist-control mode of ventilation with a rate of 26, tidal 

volume 450, FiO2 100% and PEEP of 5, blood gas this morning shows pO2 of greater

than 400, pCO2 of 59, and pH of 7.19.  This was done on respiratory rate of 24 

which was subsequently increased to 26, and FiO2 was dropped down to 40%.  His 

chest x-ray today shows no active cardiopulmonary disease, ET tube 4 cm above 

the kenyetta.  Today's blood work has been reviewed, with little, is 5.5, 

hemoglobin is 12.9, serum sodium is 1:30 which is improved from 127 on admiss

ion, potassium is 4.2, chloride is 100, BUN is 16, creatinine 0.78.  Troponin 

was 0.0-3, CRP was 5.9, proBNP was 250.  Lactic acid was 2.3, patient was given 

1, 1/2 L in fluid boluses, currently he is on 0.0 missing a rate of 80 ML per 

hour, lactic acid has improved and is down to 0.7.  Owens catheter is in place, 

patient is producing adequate amount of clear and dilute urine.  This morning 

his fever is controlled, and he is 97.8, blood pressure is 98/71, he is in sinus

mechanism with a controlled rate at 85.  He is resting comfortably, no evidence 

of grand mal seizures, he continues on thiamine replacement








Review of Systems


All systems: negative


Constitutional: Denies chills, Denies fever


Eyes: denies blurred vision, denies pain


Ears, nose, mouth and throat: Denies headache, Denies sore throat


Cardiovascular: Denies chest pain, Denies shortness of breath


Respiratory: Reports cough, Reports dyspnea


Gastrointestinal: Denies abdominal pain, Denies diarrhea, Denies nausea, Denies 

vomiting


Musculoskeletal: Denies myalgias


Integumentary: Denies pruritus, Denies rash


Neurological: Denies numbness, Denies weakness


Psychiatric: Denies anxiety, Denies depression


Endocrine: Denies fatigue, Denies weight change





Past Medical History


Past Medical History: COPD


History of Any Multi-Drug Resistant Organisms: None Reported


Past Surgical History: Hernia Repair


Additional Past Surgical History / Comment(s): Bilateral inguinal hernia 

repairs.


Past Anesthesia/Blood Transfusion Reactions: No Reported Reaction


Past Psychological History: No Psychological Hx Reported


Smoking Status: Current every day smoker


Past Alcohol Use History: Daily


Past Drug Use History: Marijuana





- Past Family History


  ** Mother


Family Medical History: Diabetes Mellitus


Additional Family Medical History / Comment(s): Mother has type II diabetes and 

trigeminal neuralgia





  ** Father


Family Medical History: Coronary Artery Disease (CAD)


Additional Family Medical History / Comment(s): Father had CABG and a mitral 

valve replaced.  He is .





Medications and Allergies


                                Home Medications











 Medication  Instructions  Recorded  Confirmed  Type


 


Unable To Assess [Unable to Assess]  22 History








                                    Allergies











Allergy/AdvReac Type Severity Reaction Status Date / Time


 


No Known Allergies Allergy   Verified 22 22:53














Physical Exam


Vitals: 


                                   Vital Signs











  Temp Pulse Resp BP Pulse Ox


 


 22 08:35   76  16  98/69  98


 


 22 07:50   78  18  98/71  98


 


 22 07:30   80   


 


 22 07:21   82   


 


 22 07:16      98


 


 22 07:00  97.8 F  85  16  98/71 


 


 22 06:00   93  18  115/80  97


 


 22 05:00   105 H  16  104/67  97


 


 22 04:40   109 H  16  115/79  98


 


 22 04:00   128 H  16  256/150  98


 


 22 03:52   146 H  18  189/136  98


 


 22 03:29   130 H  22  146/96 


 


 22 03:16   126 H  18  127/85  94 L


 


 22 02:59   123 H  18  126/89  97


 


 22 02:44   117 H   


 


 22 02:36   117 H   


 


 22 01:00  101.2 F H  115 H  20  111/69  96


 


 22 00:25   138 H   


 


 22 00:20   140 H   


 


 22 22:48  103 F H  154 H  24  121/74  93 L








                                Intake and Output











 22





 22:59 06:59 14:59


 


Intake Total  27.376 35.833


 


Balance  27.376 35.833


 


Intake:   


 


  Intake, IV Titration  27.376 35.833





  Amount   


 


    Midazolam HCl 50 mg In   35.833





    Sodium Chloride 0.9% 40   





    ml @ 10 MG/HR 10 mls/hr   





    IV .Q5H JESUS MANUEL Rx#:[id number]   


 


    propofoL 1,000 mg In  27.376 





    Empty Bag 1 bag @ 20 MCG/   





    KG/MIN 9.253 mls/hr IV .   





    [id number] JESUS MANUEL Rx#:[id number]   


 


Other:   


 


  Weight 77.111 kg  











 GENERAL EXAM: Sedated, and intubated, 57-year-old male, comfortable in no 

apparent distress.


HEAD: Normocephalic/atraumatic.


EYES: Normal reaction of pupils, equal size.  Conjunctiva pink, sclera white.


NOSE: Clear with pink turbinates.


THROAT: No erythema or exudates.


NECK: No masses, no JVD, no thyroid enlargement, no adenopathy.


CHEST: No chest wall deformity.  Symmetrical expansion. 


LUNGS: Equal air entry with no crackles, wheeze, rhonchi or dullness.


CVS: Regular rate and rhythm, normal S1 and S2, no gallops, no murmurs, no rubs


ABDOMEN: Soft, nontender.  No hepatosplenomegaly, normal bowel sounds, no 

guarding or rigidity.


EXTREMITIES: No clubbing, no edema, no cyanosis, 2+ pulses and upper and lower 

extremities.


MUSCULOSKELETAL: Muscle strength and tone normal.


SPINE: No scoliosis or deformity


SKIN: No rashes


CENTRAL NERVOUS SYSTEM: Sedated and intubated No focal deficits, tone is normal 

in all 4 extremities.














Results





- Laboratory Findings


CBC and BMP: 


                                 22 04:36





                                 22 04:36


ABG











ABG pH  7.19  (7.35-7.45)  L*  22  04:31    


 


ABG pCO2  59 mmHg (35-45)  H  22  04:31    


 


ABG pO2  >400 mmHg ()  H  22  04:31    


 


ABG O2 Saturation  99.4 % (94-97)  H  22  04:31    





PT/INR, D-dimer











PT  10.8 sec (9.0-12.0)   22  23:17    


 


INR  1.0  (<1.2)   22  23:17    








Abnormal lab findings: 


                                  Abnormal Labs











  22





  22:56 23:17 23:17


 


RBC   3.39 L 


 


Hgb   


 


Hct   


 


MCV   119.4 H 


 


MCH   42.3 H 


 


RDW   15.7 H 


 


Plt Count   142 L 


 


Lymphocytes #   


 


Lymphocytes # (Manual)   0.15 L 


 


Macrocytosis   Marked A 


 


ABG pH   


 


ABG pCO2   


 


ABG pO2   


 


ABG O2 Saturation   


 


Sodium    127 L


 


Chloride    93 L


 


Glucose    105 H


 


Plasma Lactic Acid Tyrel   


 


Calcium   


 


Phosphorus   


 


Magnesium    0.9 L*


 


AST    119 H


 


C-Reactive Protein    5.9 H


 


Total Protein   


 


Influenza Type A RNA  Detected H  














  22





  04:31 04:36 04:36


 


RBC    3.13 L


 


Hgb    12.9 L


 


Hct    37.8 L


 


MCV    120.8 H


 


MCH    41.2 H


 


RDW    15.8 H


 


Plt Count    138 L


 


Lymphocytes #    0.4 L


 


Lymphocytes # (Manual)   


 


Macrocytosis    Marked A


 


ABG pH  7.19 L*  


 


ABG pCO2  59 H  


 


ABG pO2  >400 H  


 


ABG O2 Saturation  99.4 H  


 


Sodium   


 


Chloride   


 


Glucose   


 


Plasma Lactic Acid Tyrel   2.3 H* 


 


Calcium   


 


Phosphorus   


 


Magnesium   


 


AST   


 


C-Reactive Protein   


 


Total Protein   


 


Influenza Type A RNA   














  22





  04:36


 


RBC 


 


Hgb 


 


Hct 


 


MCV 


 


MCH 


 


RDW 


 


Plt Count 


 


Lymphocytes # 


 


Lymphocytes # (Manual) 


 


Macrocytosis 


 


ABG pH 


 


ABG pCO2 


 


ABG pO2 


 


ABG O2 Saturation 


 


Sodium  130 L


 


Chloride 


 


Glucose  147 H


 


Plasma Lactic Acid Tyrel 


 


Calcium  7.2 L


 


Phosphorus  5.2 H


 


Magnesium 


 


AST  150 H


 


C-Reactive Protein 


 


Total Protein  6.2 L


 


Influenza Type A RNA 














- Diagnostic Findings


Chest x-ray: report reviewed, image reviewed


Additional studies: 


 EKG reviewed








Assessment and Plan


Plan: 


 Assessment:





#1.  Acute influenza A infection





#2.  Acute hypercapnic respiratory failure related to benzodiazepines used to 

control acute alcohol withdrawal, requiring intubation and placement on 

mechanical ventilator on 2022





#3.  Acute alcohol withdrawal, and delirium tremens





#4.  Mild lactic acidosis, corrected with IV fluids





#5.  Hypomagnesemia





#6.  Mild hyponatremia, likely hypovolemic, improved with IV hydration





#7.  COPD





#8.  Chronic smoker





#9.  EtOH abuse





#10.  Marijuana use





Plan:





Continue current vent settings


Continue nebulized bronchodilators, continue DuoNeb every 4 hours


Continue weaning FiO2 to keep O2 sats at or above 92


Patient is on Tamiflu, we'll discontinue the IV steroids


Continue GI and DVT prophylaxis


Continue 0.9 at 80, continue thiamine replacement


WA protocol


Consult RD for to feeding recommendation


Follow-up chest x-ray and labs tomorrow


Monitor for seizure activity


We'll keep intubated and sedated today


We'll evaluate for readiness to do spontaneous awakening and spontaneous 

breathing trials tomorrow


We'll follow





I have personally seen and examined the patient, performed the documentation and

the assessment and  plan as written.  Number of minutes spent on the visit: [15]

 








Time with Patient: Greater than 30

## 2022-05-12 NOTE — XR
EXAMINATION TYPE: XR chest 1V portable

 

DATE OF EXAM: 5/12/2022

 

COMPARISON: Yesterday

 

HISTORY: Short of breath

 

TECHNIQUE:

 

FINDINGS: The endotracheal tube is 4 cm from the kenyetta. There is nasogastric tube with the tip in th
e gastric fundus. Lungs are clear of infiltrate. No heart failure. Heart size is normal. There are ch
est leads.

 

IMPRESSION: No active cardiopulmonary disease. Tubing in good position. No adverse change.

## 2022-05-12 NOTE — ED
Medical Decision Making





- Medical Decision Making





57-year-old male date on condition.  Patient does have influenza with road going

to significantly active delirium tremens requiring significant amounts of Ativan

to the point of unresponsiveness not involuntary movements possible seizure-like

activity.  Patient intubated for airway protection and will be transferred 

inpatient care in the ICU





- Lab Data


Result diagrams: 


                                 05/11/22 23:17





                                 05/11/22 23:17





                                   Lab Results











  05/11/22 05/11/22 05/11/22 Range/Units





  22:56 22:56 23:17 


 


WBC    5.0  (3.8-10.6)  k/uL


 


RBC    3.39 L  (4.30-5.90)  m/uL


 


Hgb    14.3  (13.0-17.5)  gm/dL


 


Hct    40.5  (39.0-53.0)  %


 


MCV    119.4 H  (80.0-100.0)  fL


 


MCH    42.3 H  (25.0-35.0)  pg


 


MCHC    35.4  (31.0-37.0)  g/dL


 


RDW    15.7 H  (11.5-15.5)  %


 


Plt Count    142 L  (150-450)  k/uL


 


MPV    8.0  


 


Neutrophils % (Manual)    82  %


 


Band Neuts % (Manual)    5  %


 


Lymphocytes % (Manual)    3  %


 


Monocytes % (Manual)    9  %


 


Basophils % (Manual)    1  %


 


Neutrophils # (Manual)    4.30  (1.3-7.7)  k/uL


 


Lymphocytes # (Manual)    0.15 L  (1.0-4.8)  k/uL


 


Monocytes # (Manual)    0.45  (0-1.0)  k/uL


 


Basophils # (Manual)    0.05  (0-0.2)  k/uL


 


Nucleated RBCs    0  (0-0)  /100 WBC


 


Manual Slide Review    Performed  


 


Poikilocytosis (manual    Present  


 


Macrocytosis    Marked A  


 


Stomatocytes    Present  


 


PT     (9.0-12.0)  sec


 


INR     (<1.2)  


 


APTT     (22.0-30.0)  sec


 


Sodium     (137-145)  mmol/L


 


Potassium     (3.5-5.1)  mmol/L


 


Chloride     ()  mmol/L


 


Carbon Dioxide     (22-30)  mmol/L


 


Anion Gap     mmol/L


 


BUN     (9-20)  mg/dL


 


Creatinine     (0.66-1.25)  mg/dL


 


Est GFR (CKD-EPI)AfAm     (>60 ml/min/1.73 sqM)  


 


Est GFR (CKD-EPI)NonAf     (>60 ml/min/1.73 sqM)  


 


Glucose     (74-99)  mg/dL


 


Plasma Lactic Acid Tyrel     (0.7-2.0)  mmol/L


 


Calcium     (8.4-10.2)  mg/dL


 


Magnesium     (1.6-2.3)  mg/dL


 


Total Bilirubin     (0.2-1.3)  mg/dL


 


AST     (17-59)  U/L


 


ALT     (4-49)  U/L


 


Alkaline Phosphatase     ()  U/L


 


Troponin I     (0.000-0.034)  ng/mL


 


C-Reactive Protein     (<1.0)  mg/dL


 


NT-Pro-B Natriuret Pep     pg/mL


 


Total Protein     (6.3-8.2)  g/dL


 


Albumin     (3.5-5.0)  g/dL


 


Coronavirus (PCR)   Not Detected   (Not Detectd)  


 


Influenza Type A RNA  Detected H    (Not Detectd)  


 


Influenza Type B (PCR)  Not Detected    (Not Detectd)  














  05/11/22 05/11/22 05/11/22 Range/Units





  23:17 23:17 23:17 


 


WBC     (3.8-10.6)  k/uL


 


RBC     (4.30-5.90)  m/uL


 


Hgb     (13.0-17.5)  gm/dL


 


Hct     (39.0-53.0)  %


 


MCV     (80.0-100.0)  fL


 


MCH     (25.0-35.0)  pg


 


MCHC     (31.0-37.0)  g/dL


 


RDW     (11.5-15.5)  %


 


Plt Count     (150-450)  k/uL


 


MPV     


 


Neutrophils % (Manual)     %


 


Band Neuts % (Manual)     %


 


Lymphocytes % (Manual)     %


 


Monocytes % (Manual)     %


 


Basophils % (Manual)     %


 


Neutrophils # (Manual)     (1.3-7.7)  k/uL


 


Lymphocytes # (Manual)     (1.0-4.8)  k/uL


 


Monocytes # (Manual)     (0-1.0)  k/uL


 


Basophils # (Manual)     (0-0.2)  k/uL


 


Nucleated RBCs     (0-0)  /100 WBC


 


Manual Slide Review     


 


Poikilocytosis (manual     


 


Macrocytosis     


 


Stomatocytes     


 


PT  10.8    (9.0-12.0)  sec


 


INR  1.0    (<1.2)  


 


APTT  27.7    (22.0-30.0)  sec


 


Sodium   127 L   (137-145)  mmol/L


 


Potassium   3.8   (3.5-5.1)  mmol/L


 


Chloride   93 L   ()  mmol/L


 


Carbon Dioxide   26   (22-30)  mmol/L


 


Anion Gap   8   mmol/L


 


BUN   15   (9-20)  mg/dL


 


Creatinine   0.87   (0.66-1.25)  mg/dL


 


Est GFR (CKD-EPI)AfAm   >90   (>60 ml/min/1.73 sqM)  


 


Est GFR (CKD-EPI)NonAf   >90   (>60 ml/min/1.73 sqM)  


 


Glucose   105 H   (74-99)  mg/dL


 


Plasma Lactic Acid Tyrel    1.6  (0.7-2.0)  mmol/L


 


Calcium   8.6   (8.4-10.2)  mg/dL


 


Magnesium   0.9 L*   (1.6-2.3)  mg/dL


 


Total Bilirubin   1.0   (0.2-1.3)  mg/dL


 


AST   119 H   (17-59)  U/L


 


ALT   38   (4-49)  U/L


 


Alkaline Phosphatase   122   ()  U/L


 


Troponin I     (0.000-0.034)  ng/mL


 


C-Reactive Protein   5.9 H   (<1.0)  mg/dL


 


NT-Pro-B Natriuret Pep     pg/mL


 


Total Protein   6.9   (6.3-8.2)  g/dL


 


Albumin   4.3   (3.5-5.0)  g/dL


 


Coronavirus (PCR)     (Not Detectd)  


 


Influenza Type A RNA     (Not Detectd)  


 


Influenza Type B (PCR)     (Not Detectd)  














  05/11/22 05/11/22 Range/Units





  23:17 23:17 


 


WBC    (3.8-10.6)  k/uL


 


RBC    (4.30-5.90)  m/uL


 


Hgb    (13.0-17.5)  gm/dL


 


Hct    (39.0-53.0)  %


 


MCV    (80.0-100.0)  fL


 


MCH    (25.0-35.0)  pg


 


MCHC    (31.0-37.0)  g/dL


 


RDW    (11.5-15.5)  %


 


Plt Count    (150-450)  k/uL


 


MPV    


 


Neutrophils % (Manual)    %


 


Band Neuts % (Manual)    %


 


Lymphocytes % (Manual)    %


 


Monocytes % (Manual)    %


 


Basophils % (Manual)    %


 


Neutrophils # (Manual)    (1.3-7.7)  k/uL


 


Lymphocytes # (Manual)    (1.0-4.8)  k/uL


 


Monocytes # (Manual)    (0-1.0)  k/uL


 


Basophils # (Manual)    (0-0.2)  k/uL


 


Nucleated RBCs    (0-0)  /100 WBC


 


Manual Slide Review    


 


Poikilocytosis (manual    


 


Macrocytosis    


 


Stomatocytes    


 


PT    (9.0-12.0)  sec


 


INR    (<1.2)  


 


APTT    (22.0-30.0)  sec


 


Sodium    (137-145)  mmol/L


 


Potassium    (3.5-5.1)  mmol/L


 


Chloride    ()  mmol/L


 


Carbon Dioxide    (22-30)  mmol/L


 


Anion Gap    mmol/L


 


BUN    (9-20)  mg/dL


 


Creatinine    (0.66-1.25)  mg/dL


 


Est GFR (CKD-EPI)AfAm    (>60 ml/min/1.73 sqM)  


 


Est GFR (CKD-EPI)NonAf    (>60 ml/min/1.73 sqM)  


 


Glucose    (74-99)  mg/dL


 


Plasma Lactic Acid Tyrel    (0.7-2.0)  mmol/L


 


Calcium    (8.4-10.2)  mg/dL


 


Magnesium    (1.6-2.3)  mg/dL


 


Total Bilirubin    (0.2-1.3)  mg/dL


 


AST    (17-59)  U/L


 


ALT    (4-49)  U/L


 


Alkaline Phosphatase    ()  U/L


 


Troponin I  0.023   (0.000-0.034)  ng/mL


 


C-Reactive Protein    (<1.0)  mg/dL


 


NT-Pro-B Natriuret Pep   250  pg/mL


 


Total Protein    (6.3-8.2)  g/dL


 


Albumin    (3.5-5.0)  g/dL


 


Coronavirus (PCR)    (Not Detectd)  


 


Influenza Type A RNA    (Not Detectd)  


 


Influenza Type B (PCR)    (Not Detectd)  














- Radiology Data


Radiology results: report reviewed (Chest x-ray positive for ET tube), image 

reviewed





Critical Care Time


Critical Care Time: Yes


Total Critical Care Time: 65





Disposition


Clinical Impression: 


 Reactive airway disease, Acute exacerbation of chronic obstructive pulmonary 

disease, Fever, Tachycardia, Hypomagnesemia, Influenza A, Weakness, Alcohol 

withdrawal, Sepsis, Acute respiratory failure, Acute respiratory distress 

syndrome in adult, Delirium tremens





Disposition: ADMITTED AS IP TO THIS HOSP


Condition: Critical


Is patient prescribed a controlled substance at d/c from ED?: No





Procedures





- Universal Protocol (Time Out)


Nurse: Irina Gomez R





- Intubation


Sedative: Versed


Paralytic: Succinylcholine


Laryngoscope: Jose


Size: 4


ET Tube Size: 8


ET Tube Uncuffed: No


Tube Secured Location: teeth


Tube Placement Confirmation: visualized tube passing through cords, equal breath

sounds bilaterally, no breath sounds over epigastrium, confirmation by 

capnometry


Patient Tolerated Procedure: well


Intubation Complications: none

## 2022-05-12 NOTE — P.HPIM
History of Present Illness


H&P Date: 22


The patient is a 57-year-old male with a PMH of EtOH abuse and COPD who had 

presented to the emergency room for fever and overall not feeling well.  The 

patient was reportedly denying experiencing chest discomfort or shortness of 

breath.  While in the emergency room, the patient was septic and developed 

delirium tremens.  He received 20 mg of Ativan IV push without significant 

improvement.  He subsequently had a seizure and was intubated for airway 

protection.  Laboratory evaluation revealed magnesium of 0.9, troponin 0.023, 

lactic acid 1.6, and influenza type A positive.  Initial EKG had revealed sinus 

tachycardia with APCs at 151 bpm.  The patient was intubated at the time of 

evaluation and no history could thereby be obtained.





Review of systems:


Unable to obtain as patient intubated





Physical examination:


General: non toxic, no distress, appears at stated age, normal weight


Derm: no unusual rashes/lesions no unusual ecchymoses, warm, dry


Head: atraumatic, normocephalic, symmetric


Eyes: Anicteric sclera, pupils equal round reactive to light


ENT: Nose and ears atraumatic


Neck: No thyromegaly, no cervical lymphadenopathy, trachea midline, supple


Mouth: no lip lesion


Cardiovascular: S1S2 reg, no murmur, positive posterior tibial pulse bilateral, 

no edema, capillary refill less than 2 seconds


Lungs: CTA bilateral, no rhonchi, no rales , no accessory muscle use


Abdominal: soft,  nontender to palpation, no guarding, no appreciable 

organomegaly, normal bowel sounds


Ext: no gross muscle atrophy, no contractures, 


Neuro: Unable to assess, unresponsive to noxious stimuli





Assessment/plan





Sepsis secondary to influenza infection


-Continue with Tamiflu


-IVFs


-F/u cultures





Delirium tremens


-CIWA protocol





Hypomagnesemia


-Replace and monitor





Acute hypoxic respiratory failure with baseline COPD


-Continue with Solu-Medrol





DVT prophylaxis


-Heparin subcu





The patient is admitted with an anticipated greater than 2 midnight stay for 

evaluation of sepsis, influenza


CODE STATUS: Full Code


Anticipated discharge date: 3-4 days


Anticipated discharge place: Home








Past Medical History


Past Medical History: COPD


History of Any Multi-Drug Resistant Organisms: None Reported


Past Surgical History: Hernia Repair


Additional Past Surgical History / Comment(s): Bilateral inguinal hernia 

repairs.


Past Anesthesia/Blood Transfusion Reactions: No Reported Reaction


Past Psychological History: No Psychological Hx Reported


Smoking Status: Current every day smoker


Past Alcohol Use History: Daily


Past Drug Use History: Marijuana





- Past Family History


  ** Mother


Family Medical History: Diabetes Mellitus


Additional Family Medical History / Comment(s): Mother has type II diabetes and 

trigeminal neuralgia





  ** Father


Family Medical History: Coronary Artery Disease (CAD)


Additional Family Medical History / Comment(s): Father had CABG and a mitral 

valve replaced.  He is .





Medications and Allergies


                                Home Medications











 Medication  Instructions  Recorded  Confirmed  Type


 


Dm/Acetaminophen/Doxylamine [Vicks 2 cap PO HS PRN 17 History





Nyquil Liquicaps]    








                                    Allergies











Allergy/AdvReac Type Severity Reaction Status Date / Time


 


No Known Allergies Allergy   Verified 22 22:53














Physical Exam


Vitals: 


                                   Vital Signs











  Temp Pulse Resp BP Pulse Ox


 


 22 04:00   128 H  16  256/150  98


 


 22 03:52   146 H  18  189/136  98


 


 22 03:29   130 H  22  146/96 


 


 22 03:16   126 H  18  127/85  94 L


 


 22 02:59   123 H  18  126/89  97


 


 22 02:44   117 H   


 


 22 02:36   117 H   


 


 22 01:00  101.2 F H  115 H  20  111/69  96


 


 22 00:25   138 H   


 


 22 00:20   140 H   


 


 22 22:48  103 F H  154 H  24  121/74  93 L








                                Intake and Output











 22





 14:59 22:59 06:59


 


Other:   


 


  Weight  77.111 kg 














Results


CBC & Chem 7: 


                                 22 23:17





                                 22 23:17


Labs: 


                  Abnormal Lab Results - Last 24 Hours (Table)











  22 Range/Units





  22:56 23:17 23:17 


 


RBC   3.39 L   (4.30-5.90)  m/uL


 


MCV   119.4 H   (80.0-100.0)  fL


 


MCH   42.3 H   (25.0-35.0)  pg


 


RDW   15.7 H   (11.5-15.5)  %


 


Plt Count   142 L   (150-450)  k/uL


 


Lymphocytes # (Manual)   0.15 L   (1.0-4.8)  k/uL


 


Macrocytosis   Marked A   


 


Sodium    127 L  (137-145)  mmol/L


 


Chloride    93 L  ()  mmol/L


 


Glucose    105 H  (74-99)  mg/dL


 


Magnesium    0.9 L*  (1.6-2.3)  mg/dL


 


AST    119 H  (17-59)  U/L


 


C-Reactive Protein    5.9 H  (<1.0)  mg/dL


 


Influenza Type A RNA  Detected H    (Not Detectd)

## 2022-05-13 LAB
ALBUMIN SERPL-MCNC: 3.1 G/DL (ref 3.5–5)
ALP SERPL-CCNC: 89 U/L (ref 38–126)
ALT SERPL-CCNC: 37 U/L (ref 4–49)
ANION GAP SERPL CALC-SCNC: 3 MMOL/L
AST SERPL-CCNC: 97 U/L (ref 17–59)
BASOPHILS # BLD AUTO: 0.1 K/UL (ref 0–0.2)
BASOPHILS NFR BLD AUTO: 1 %
BUN SERPL-SCNC: 14 MG/DL (ref 9–20)
CALCIUM SPEC-MCNC: 8.5 MG/DL (ref 8.4–10.2)
CHLORIDE SERPL-SCNC: 109 MMOL/L (ref 98–107)
CO2 BLDA-SCNC: 26 MMOL/L (ref 19–24)
CO2 SERPL-SCNC: 25 MMOL/L (ref 22–30)
EOSINOPHIL # BLD AUTO: 0 K/UL (ref 0–0.7)
EOSINOPHIL NFR BLD AUTO: 0 %
ERYTHROCYTE [DISTWIDTH] IN BLOOD BY AUTOMATED COUNT: 2.91 M/UL (ref 4.3–5.9)
ERYTHROCYTE [DISTWIDTH] IN BLOOD: 15 % (ref 11.5–15.5)
GLUCOSE SERPL-MCNC: 149 MG/DL (ref 74–99)
HCO3 BLDA-SCNC: 25 MMOL/L (ref 21–25)
HCT VFR BLD AUTO: 35.6 % (ref 39–53)
HGB BLD-MCNC: 12 GM/DL (ref 13–17.5)
LYMPHOCYTES # SPEC AUTO: 0.3 K/UL (ref 1–4.8)
LYMPHOCYTES NFR SPEC AUTO: 4 %
MAGNESIUM SPEC-SCNC: 2.3 MG/DL (ref 1.6–2.3)
MCH RBC QN AUTO: 41.3 PG (ref 25–35)
MCHC RBC AUTO-ENTMCNC: 33.8 G/DL (ref 31–37)
MCV RBC AUTO: 122.2 FL (ref 80–100)
MONOCYTES # BLD AUTO: 0.2 K/UL (ref 0–1)
MONOCYTES NFR BLD AUTO: 3 %
NEUTROPHILS # BLD AUTO: 7 K/UL (ref 1.3–7.7)
NEUTROPHILS NFR BLD AUTO: 91 %
PCO2 BLDA: 44 MMHG (ref 35–45)
PH BLDA: 7.36 [PH] (ref 7.35–7.45)
PLATELET # BLD AUTO: 143 K/UL (ref 150–450)
PO2 BLDA: 84 MMHG (ref 83–108)
POTASSIUM SERPL-SCNC: 3.6 MMOL/L (ref 3.5–5.1)
PROT SERPL-MCNC: 5.5 G/DL (ref 6.3–8.2)
SODIUM SERPL-SCNC: 137 MMOL/L (ref 137–145)
WBC # BLD AUTO: 7.7 K/UL (ref 3.8–10.6)

## 2022-05-13 RX ADMIN — MORPHINE SULFATE PRN MG: 4 INJECTION, SOLUTION INTRAMUSCULAR; INTRAVENOUS at 08:08

## 2022-05-13 RX ADMIN — IPRATROPIUM BROMIDE AND ALBUTEROL SULFATE SCH ML: .5; 3 SOLUTION RESPIRATORY (INHALATION) at 07:25

## 2022-05-13 RX ADMIN — IPRATROPIUM BROMIDE AND ALBUTEROL SULFATE SCH ML: .5; 3 SOLUTION RESPIRATORY (INHALATION) at 20:20

## 2022-05-13 RX ADMIN — OSELTAMIVIR PHOSPHATE SCH MG: 75 CAPSULE ORAL at 08:52

## 2022-05-13 RX ADMIN — Medication SCH: at 17:32

## 2022-05-13 RX ADMIN — CHLORHEXIDINE GLUCONATE SCH ML: 1.2 RINSE ORAL at 08:12

## 2022-05-13 RX ADMIN — IPRATROPIUM BROMIDE AND ALBUTEROL SULFATE SCH ML: .5; 3 SOLUTION RESPIRATORY (INHALATION) at 23:21

## 2022-05-13 RX ADMIN — Medication SCH: at 07:24

## 2022-05-13 RX ADMIN — Medication SCH MG: at 08:53

## 2022-05-13 RX ADMIN — IPRATROPIUM BROMIDE AND ALBUTEROL SULFATE SCH ML: .5; 3 SOLUTION RESPIRATORY (INHALATION) at 11:53

## 2022-05-13 RX ADMIN — PANTOPRAZOLE SODIUM SCH MG: 40 INJECTION, POWDER, FOR SOLUTION INTRAVENOUS at 08:12

## 2022-05-13 RX ADMIN — POTASSIUM CHLORIDE SCH MLS/HR: 7.46 INJECTION, SOLUTION INTRAVENOUS at 06:30

## 2022-05-13 RX ADMIN — IPRATROPIUM BROMIDE AND ALBUTEROL SULFATE SCH: .5; 3 SOLUTION RESPIRATORY (INHALATION) at 05:46

## 2022-05-13 RX ADMIN — IPRATROPIUM BROMIDE AND ALBUTEROL SULFATE SCH ML: .5; 3 SOLUTION RESPIRATORY (INHALATION) at 15:54

## 2022-05-13 RX ADMIN — MORPHINE SULFATE PRN MG: 4 INJECTION, SOLUTION INTRAMUSCULAR; INTRAVENOUS at 10:41

## 2022-05-13 RX ADMIN — POTASSIUM CHLORIDE SCH MLS/HR: 7.46 INJECTION, SOLUTION INTRAVENOUS at 08:11

## 2022-05-13 RX ADMIN — POTASSIUM CHLORIDE SCH MLS/HR: 14.9 INJECTION, SOLUTION INTRAVENOUS at 15:09

## 2022-05-13 RX ADMIN — OSELTAMIVIR PHOSPHATE SCH: 75 CAPSULE ORAL at 21:48

## 2022-05-13 RX ADMIN — POTASSIUM CHLORIDE SCH MLS/HR: 14.9 INJECTION, SOLUTION INTRAVENOUS at 07:00

## 2022-05-13 RX ADMIN — DEXMEDETOMIDINE HYDROCHLORIDE SCH MLS/HR: 4 INJECTION, SOLUTION INTRAVENOUS at 12:05

## 2022-05-13 RX ADMIN — Medication SCH: at 21:48

## 2022-05-13 NOTE — XR
EXAMINATION TYPE: XR chest 1V

 

DATE OF EXAM: 5/13/2022

 

COMPARISON: 5/12/2022

 

HISTORY: SOB, Follow Up

 

FINDINGS:

 

Indwelling tubes and catheters are unchanged.

 

No focal infiltrates seen.

 

Stable appearance of the cardio-mediastinal structures at this time.

 

 

 

IMPRESSION:

1.  Stable portable chest.  Clinical correlation and follow up until resolution is recommended.

## 2022-05-13 NOTE — P.PN
Subjective


Progress Note Date: 05/13/22





Pt was successfully extubated to HFNC.  Still groggy, encephalopathic.  





Gen: awake, alert


HEENT: normocephalic, atraumatic, good hearing acuity, moist mucous membranes


Resp: good air exchange, breathing comfortably with no accessory muscle use


CVS: good distal perfusion x 4,


GI: soft, NTTP, ND


: no SPT, no CVAT, mancera catheter is present


MSK: no pitting edema, no clubbing


Neuro: non-focal, moving all extremities


Psych: cooperative, euthymic mood





Assessment/plan:





Sepsis secondary to influenza infection


-Continue with Tamiflu


-IVFs


-F/u cultures





Alcohol withdrawal syndrome


Delirium tremens


-CIWA protocol+ Ativan when necessary


-Thiamine, folate, multivitamin





Hypomagnesemia


-Replace and monitor





Acute hypoxic respiratory failure with baseline COPD


-Continue with Solu-Medrol





DVT prophylaxis


-Heparin subcu





The patient is admitted with an anticipated greater than 2 midnight stay for 

evaluation of sepsis, influenza


CODE STATUS: Full Code


Anticipated discharge date: 3-4 days


Anticipated discharge place: Home





Objective





- Vital Signs


Vital signs: 


                                   Vital Signs











Temp  99.6 F   05/13/22 12:00


 


Pulse  107 H  05/13/22 14:00


 


Resp  31 H  05/13/22 14:00


 


BP  106/81   05/13/22 14:00


 


Pulse Ox  94 L  05/13/22 14:00








                                 Intake & Output











 05/12/22 05/13/22 05/13/22





 18:59 06:59 18:59


 


Intake Total 6234.506 2418.169 752.773


 


Output Total 5625 1230 620


 


Balance -4029.158 -211.831 132.773


 


Weight 77.111 kg 71.5 kg 71.5 kg


 


Intake:   


 


  IV 1340 880 560


 


    Dextrose 5%-0.45% NaCl 1, 1240 880 560





    000 ml @ 80 mls/hr IV .   





    [id number] JESUS MANUEL Rx#:[id number]   


 


    propofoL 1,000 mg In 100  





    Empty Bag 1 bag @ 20 MCG/   





    KG/MIN 9.253 mls/hr IV .   





    [id number] JESUS MANUEL Rx#:[id number]   


 


  Intake, IV Titration 255.842 138.169 132.773





  Amount   


 


    Dexmedetomidine/0.9% NaCl   10.487





    (Pmx) 400 mcg In Empty   





    Bag 1 bag @ 0.4 MCG/KG/HR   





    7.15 mls/hr IV .Q14H JESUS MANUEL   





    Rx#:[id number]   


 


    Midazolam HCl 50 mg In 35.833  





    Sodium Chloride 0.9% 40   





    ml @ 10 MG/HR 10 mls/hr   





    IV .Q5H JESUS MANUEL Rx#:[id number]   


 


    Potassium Chloride 10 meq   100





    In Water For Injection 1   





    100ml.bag @ 100 mls/hr   





    IVPB Q1H JESUS MANUEL Rx#:   





    [id number]   


 


    propofoL 1,000 mg In 220.009 138.169 22.286





    Empty Bag 1 bag @ 20 MCG/   





    KG/MIN 9.253 mls/hr IV .   





    [id number] JESUS MANUEL Rx#:[id number]   


 


  Other   60


 


Output:   


 


  Gastric Drainage  400 300


 


  Urine 5625 830 320


 


Other:   


 


  Voiding Method Indwelling Catheter Indwelling Catheter Indwelling Catheter














- Labs


CBC & Chem 7: 


                                 05/13/22 03:50





                                 05/13/22 03:50


Labs: 


                  Abnormal Lab Results - Last 24 Hours (Table)











  05/12/22 05/13/22 05/13/22 Range/Units





  18:03 03:50 03:50 


 


RBC   2.91 L   (4.30-5.90)  m/uL


 


Hgb   12.0 L   (13.0-17.5)  gm/dL


 


Hct   35.6 L   (39.0-53.0)  %


 


MCV   122.2 H   (80.0-100.0)  fL


 


MCH   41.3 H   (25.0-35.0)  pg


 


Plt Count   143 L   (150-450)  k/uL


 


Lymphocytes #   0.3 L   (1.0-4.8)  k/uL


 


Macrocytosis   Marked A   


 


ABG Total CO2     (19-24)  mmol/L


 


Chloride    109 H  ()  mmol/L


 


Creatinine    0.60 L  (0.66-1.25)  mg/dL


 


Glucose    149 H  (74-99)  mg/dL


 


POC Glucose (mg/dL)  165 H    (75-99)  mg/dL


 


AST    97 H  (17-59)  U/L


 


Total Protein    5.5 L  (6.3-8.2)  g/dL


 


Albumin    3.1 L  (3.5-5.0)  g/dL














  05/13/22 Range/Units





  05:56 


 


RBC   (4.30-5.90)  m/uL


 


Hgb   (13.0-17.5)  gm/dL


 


Hct   (39.0-53.0)  %


 


MCV   (80.0-100.0)  fL


 


MCH   (25.0-35.0)  pg


 


Plt Count   (150-450)  k/uL


 


Lymphocytes #   (1.0-4.8)  k/uL


 


Macrocytosis   


 


ABG Total CO2  26 H  (19-24)  mmol/L


 


Chloride   ()  mmol/L


 


Creatinine   (0.66-1.25)  mg/dL


 


Glucose   (74-99)  mg/dL


 


POC Glucose (mg/dL)   (75-99)  mg/dL


 


AST   (17-59)  U/L


 


Total Protein   (6.3-8.2)  g/dL


 


Albumin   (3.5-5.0)  g/dL








                      Microbiology - Last 24 Hours (Table)











 05/12/22 04:25 Gram Stain - Preliminary





 Sputum Sputum Culture - Preliminary





    Streptococcus pneumoniae


 


 05/11/22 23:17 Blood Culture - Preliminary





 Blood    No Growth after 24 hours

## 2022-05-13 NOTE — P.PN
Subjective


Progress Note Date: 05/13/22


Principal diagnosis: 





Alcohol withdrawal syndrome.





 57-year-old male patient, current smoker, with history of COPD, EtOH and 

marijuana use, presented to the emergency department on 05/11/2022 for 

evaluation of cough, congestion, dyspnea and fever.  Patient tested positive for

influenza A, and tested negative for COVID-19 and influenza B.  He had a fever 

of 103 degrees Fahrenheit on presentation.  He was tachycardic with a rate of 

154, but in sinus mechanism.  Chest x-ray showed no active cardiopulmonary 

disease.  Patient was started on Tamiflu, IV steroids, and breathing treatments.

 However patient was starting to become agitated, and it was suspected that he 

was going through acute EtOH withdrawal, patient was given multiple doses of IV 

Ativan, to the point of unresponsiveness, and there was a concern about his 

airway protection and for that reason she was intubated and placed on mechanical

ventilator.  He was started on propofol and Versed infusion.  He is awaiting a 

bed in the ICU, he seen in the emergency department.  He is sedated and 

intubated, he is on assist-control mode of ventilation with a rate of 26, tidal 

volume 450, FiO2 100% and PEEP of 5, blood gas this morning shows pO2 of greater

than 400, pCO2 of 59, and pH of 7.19.  This was done on respiratory rate of 24 

which was subsequently increased to 26, and FiO2 was dropped down to 40%.  His 

chest x-ray today shows no active cardiopulmonary disease, ET tube 4 cm above 

the kenyetta.  Today's blood work has been reviewed, with little, is 5.5, 

hemoglobin is 12.9, serum sodium is 1:30 which is improved from 127 on 

admission, potassium is 4.2, chloride is 100, BUN is 16, creatinine 0.78.  

Troponin was 0.0-3, CRP was 5.9, proBNP was 250.  Lactic acid was 2.3, patient 

was given 1, 1/2 L in fluid boluses, currently he is on 0.0 missing a rate of 80

ML per hour, lactic acid has improved and is down to 0.7.  Owens catheter is in 

place, patient is producing adequate amount of clear and dilute urine.  This 

morning his fever is controlled, and he is 97.8, blood pressure is 98/71, he is 

in sinus mechanism with a controlled rate at 85.  He is resting comfortably, no 

evidence of grand mal seizures, he continues on thiamine replacement





Progress note dated 05/13/2022.





This is a 57-year-old male, who is seen today in room 267, and the intensive 

care unit.   Yesterday, he was seen in the emergency room.  He has a history of 

COPD, alcohol and marijuana use, and chronic tobacco use.  The patient was seen 

in the emergency room for alcohol withdrawal syndrome, and became very agitated,

and was started on Ativan, and required intubation and mechanical ventilation 

for airway protection.  He was also found to be positive for influenza A, and 

was started on Tamiflu.  The patient remains in the intensive care unit on the 

ventilator.  Vent settings include a volume assist control, rate 26, tidal 

volume 450, FiO2 40%, and PEEP of 5.  Blood gases show pO2 of 84, pCO2 44, and a

pH is 7.36.  The patient's on D5.45 at 80 mL an hour, propofol 50 mcg/kg/m, and 

no tube feedings.  Today, the patient will have a daily interruption of 

sedation, and a spontaneous breathing trial, on pressure support of 5, and CPAP 

of 5.  I believe the patient can likely be extubated.  White count 7.7, 

hemoglobin 12, hematocrit 35.6, platelet count 243,000.  Sodium 137, potassium 

3.6, chlorides 109, CO2 25, BUN 14, creatinine 0.6.  Albumin 3.1.  Chest x-ray, 

and my opinion, is normal.





Objective





- Vital Signs


Vital signs: 


                                   Vital Signs











Temp  97.7 F   05/13/22 08:00


 


Pulse  94   05/13/22 10:00


 


Resp  26 H  05/13/22 10:00


 


BP  112/77   05/13/22 10:00


 


Pulse Ox  98   05/13/22 10:00








                                 Intake & Output











 05/12/22 05/13/22 05/13/22





 18:59 06:59 18:59


 


Intake Total 4629.667 2724.169 400


 


Output Total 5625 1230 155


 


Balance -4029.158 -211.831 245


 


Weight 77.111 kg 71.5 kg 


 


Intake:   


 


  IV 1340 880 240


 


    Dextrose 5%-0.45% NaCl 1, 1240 880 240





    000 ml @ 80 mls/hr IV .   





    [id number] Rx#:[id number]   


 


    propofoL 1,000 mg In 100  





    Empty Bag 1 bag @ 20 MCG/   





    KG/MIN 9.253 mls/hr IV .   





    [id number] [facility] Rx#:[id number]   


 


  Intake, IV Titration 255.842 138.169 100





  Amount   


 


    Midazolam HCl 50 mg In 35.833  





    Sodium Chloride 0.9% 40   





    ml @ 10 MG/HR 10 mls/hr   





    IV .Q5H [facility] Rx#:[id number]   


 


    Potassium Chloride 10 meq   100





    In Water For Injection 1   





    100ml.bag @ 100 mls/hr   





    IVPB Q1H [facility] Rx#:   





    [id number]   


 


    propofoL 1,000 mg In 220.009 138.169 





    Empty Bag 1 bag @ 20 MCG/   





    KG/MIN 9.253 mls/hr IV .   





    [id number] [facility] Rx#:[id number]   


 


  Other   60


 


Output:   


 


  Gastric Drainage  400 


 


  Urine 5625 830 155


 


Other:   


 


  Voiding Method Indwelling Catheter Indwelling Catheter 














- Exam





No acute distress, sedated, with an orally placed endotracheal tube and NG tube.





HEENT examination is grossly unremarkable.  





Neck supple.  Full range of motion.  No adenopathy thyromegaly or neck vein 

distention.





Cardiovascular examination reveals regular rhythm rate.  S1-S2 normal.  No S3 or

S4.  No discernible murmur noted.  Heart rate 94 bpm.





Lungs reveal mild scattered rhonchi.  No wheezes.  No crackles.  Breath sounds 

equal bilaterally.  Saturations are 98%.





Abdomen soft bowel sounds are heard.  No masses or tenderness.





Extremities are intact.  No cyanosis clubbing or edema.





Skin is without rash or lesion.





Neurologic examination could not be properly assessed.





- Labs


CBC & Chem 7: 


                                 05/13/22 03:50





                                 05/13/22 03:50


Labs: 


                  Abnormal Lab Results - Last 24 Hours (Table)











  05/12/22 05/13/22 05/13/22 Range/Units





  18:03 03:50 03:50 


 


RBC   2.91 L   (4.30-5.90)  m/uL


 


Hgb   12.0 L   (13.0-17.5)  gm/dL


 


Hct   35.6 L   (39.0-53.0)  %


 


MCV   122.2 H   (80.0-100.0)  fL


 


MCH   41.3 H   (25.0-35.0)  pg


 


Plt Count   143 L   (150-450)  k/uL


 


Lymphocytes #   0.3 L   (1.0-4.8)  k/uL


 


Macrocytosis   Marked A   


 


ABG Total CO2     (19-24)  mmol/L


 


Chloride    109 H  ()  mmol/L


 


Creatinine    0.60 L  (0.66-1.25)  mg/dL


 


Glucose    149 H  (74-99)  mg/dL


 


POC Glucose (mg/dL)  165 H    (75-99)  mg/dL


 


AST    97 H  (17-59)  U/L


 


Total Protein    5.5 L  (6.3-8.2)  g/dL


 


Albumin    3.1 L  (3.5-5.0)  g/dL














  05/13/22 Range/Units





  05:56 


 


RBC   (4.30-5.90)  m/uL


 


Hgb   (13.0-17.5)  gm/dL


 


Hct   (39.0-53.0)  %


 


MCV   (80.0-100.0)  fL


 


MCH   (25.0-35.0)  pg


 


Plt Count   (150-450)  k/uL


 


Lymphocytes #   (1.0-4.8)  k/uL


 


Macrocytosis   


 


ABG Total CO2  26 H  (19-24)  mmol/L


 


Chloride   ()  mmol/L


 


Creatinine   (0.66-1.25)  mg/dL


 


Glucose   (74-99)  mg/dL


 


POC Glucose (mg/dL)   (75-99)  mg/dL


 


AST   (17-59)  U/L


 


Total Protein   (6.3-8.2)  g/dL


 


Albumin   (3.5-5.0)  g/dL








                      Microbiology - Last 24 Hours (Table)











 05/12/22 04:25 Gram Stain - Preliminary





 Sputum Sputum Culture - Preliminary


 


 05/11/22 23:17 Blood Culture - Preliminary





 Blood    No Growth after 24 hours














Assessment and Plan


Assessment: 





Acute influenza A infection.





Hypercapnic respiratory failure secondary to excess Ativan use, for alcohol 

withdrawal, requiring intubation and mechanical ventilation on 05/11/2022.





Acute alcohol withdrawal syndrome, and delirium tremens.





Mild lactic acidosis.





Hypomagnesemia.





Mild hyponatremia.





COPD from chronic tobacco use.





Chronic alcohol abuse.





History of marijuana use.


Plan: 





Plan dated 05/13/2022.





The patient will have a daily interruption of sedation today, and hopefully a 

spontaneous breathing trial, on pressure support of 5 and CPAP of 5.  The 

patient's labs, x-rays, and medications all reviewed.  Blood gases are 

excellent.  The patient remains on propofol at 50 mcg/kg/m.  Tube feedings have 

not yet been started.  If he is not extubated today, we will have to be started 

today.  The patient will continue on Ativan for potential alcohol withdrawal if 

necessary.  The patient was started on Tamiflu, 75 mg twice a day for a total of

5 days.  Additional recommendations and suggestions are forthcoming.  Prognosis 

is guarded.


Time with Patient: Greater than 30

## 2022-05-14 LAB
ANION GAP SERPL CALC-SCNC: 3 MMOL/L
BUN SERPL-SCNC: 13 MG/DL (ref 9–20)
CALCIUM SPEC-MCNC: 8.4 MG/DL (ref 8.4–10.2)
CHLORIDE SERPL-SCNC: 106 MMOL/L (ref 98–107)
CO2 SERPL-SCNC: 28 MMOL/L (ref 22–30)
ERYTHROCYTE [DISTWIDTH] IN BLOOD BY AUTOMATED COUNT: 2.87 M/UL (ref 4.3–5.9)
ERYTHROCYTE [DISTWIDTH] IN BLOOD: 15 % (ref 11.5–15.5)
GLUCOSE SERPL-MCNC: 107 MG/DL (ref 74–99)
HCT VFR BLD AUTO: 34.8 % (ref 39–53)
HGB BLD-MCNC: 11.8 GM/DL (ref 13–17.5)
MCH RBC QN AUTO: 41 PG (ref 25–35)
MCHC RBC AUTO-ENTMCNC: 33.8 G/DL (ref 31–37)
MCV RBC AUTO: 121.4 FL (ref 80–100)
PLATELET # BLD AUTO: 137 K/UL (ref 150–450)
POTASSIUM SERPL-SCNC: 4.2 MMOL/L (ref 3.5–5.1)
SODIUM SERPL-SCNC: 137 MMOL/L (ref 137–145)
WBC # BLD AUTO: 3.6 K/UL (ref 3.8–10.6)

## 2022-05-14 RX ADMIN — IPRATROPIUM BROMIDE AND ALBUTEROL SULFATE SCH ML: .5; 3 SOLUTION RESPIRATORY (INHALATION) at 20:00

## 2022-05-14 RX ADMIN — Medication SCH: at 18:17

## 2022-05-14 RX ADMIN — IPRATROPIUM BROMIDE AND ALBUTEROL SULFATE SCH: .5; 3 SOLUTION RESPIRATORY (INHALATION) at 03:27

## 2022-05-14 RX ADMIN — POTASSIUM CHLORIDE SCH MLS/HR: 14.9 INJECTION, SOLUTION INTRAVENOUS at 01:22

## 2022-05-14 RX ADMIN — HALOPERIDOL LACTATE PRN MG: 5 INJECTION, SOLUTION INTRAMUSCULAR at 16:06

## 2022-05-14 RX ADMIN — DEXMEDETOMIDINE HYDROCHLORIDE SCH MLS/HR: 4 INJECTION, SOLUTION INTRAVENOUS at 01:22

## 2022-05-14 RX ADMIN — LEVOFLOXACIN SCH MLS/HR: 750 INJECTION, SOLUTION INTRAVENOUS at 13:13

## 2022-05-14 RX ADMIN — IPRATROPIUM BROMIDE AND ALBUTEROL SULFATE SCH ML: .5; 3 SOLUTION RESPIRATORY (INHALATION) at 12:03

## 2022-05-14 RX ADMIN — Medication SCH MG: at 20:53

## 2022-05-14 RX ADMIN — DEXMEDETOMIDINE HYDROCHLORIDE SCH MLS/HR: 4 INJECTION, SOLUTION INTRAVENOUS at 18:57

## 2022-05-14 RX ADMIN — Medication SCH: at 14:10

## 2022-05-14 RX ADMIN — POTASSIUM CHLORIDE SCH MLS/HR: 14.9 INJECTION, SOLUTION INTRAVENOUS at 16:06

## 2022-05-14 RX ADMIN — OSELTAMIVIR PHOSPHATE SCH: 75 CAPSULE ORAL at 14:10

## 2022-05-14 RX ADMIN — MORPHINE SULFATE PRN MG: 4 INJECTION, SOLUTION INTRAMUSCULAR; INTRAVENOUS at 16:23

## 2022-05-14 RX ADMIN — PANTOPRAZOLE SODIUM SCH MG: 40 INJECTION, POWDER, FOR SOLUTION INTRAVENOUS at 08:10

## 2022-05-14 RX ADMIN — IPRATROPIUM BROMIDE AND ALBUTEROL SULFATE SCH ML: .5; 3 SOLUTION RESPIRATORY (INHALATION) at 08:12

## 2022-05-14 RX ADMIN — DEXMEDETOMIDINE HYDROCHLORIDE SCH MLS/HR: 4 INJECTION, SOLUTION INTRAVENOUS at 06:23

## 2022-05-14 RX ADMIN — OSELTAMIVIR PHOSPHATE SCH MG: 75 CAPSULE ORAL at 20:50

## 2022-05-14 RX ADMIN — OSELTAMIVIR PHOSPHATE SCH MG: 75 CAPSULE ORAL at 15:12

## 2022-05-14 RX ADMIN — IPRATROPIUM BROMIDE AND ALBUTEROL SULFATE SCH ML: .5; 3 SOLUTION RESPIRATORY (INHALATION) at 15:51

## 2022-05-14 RX ADMIN — DEXMEDETOMIDINE HYDROCHLORIDE SCH MLS/HR: 4 INJECTION, SOLUTION INTRAVENOUS at 10:52

## 2022-05-14 NOTE — P.PN
Subjective


Progress Note Date: 05/14/22


Principal diagnosis: 





Alcohol withdrawal syndrome.





 57-year-old male patient, current smoker, with history of COPD, EtOH and 

marijuana use, presented to the emergency department on 05/11/2022 for 

evaluation of cough, congestion, dyspnea and fever.  Patient tested positive for

influenza A, and tested negative for COVID-19 and influenza B.  He had a fever 

of 103 degrees Fahrenheit on presentation.  He was tachycardic with a rate of 

154, but in sinus mechanism.  Chest x-ray showed no active cardiopulmonary 

disease.  Patient was started on Tamiflu, IV steroids, and breathing treatments.

 However patient was starting to become agitated, and it was suspected that he 

was going through acute EtOH withdrawal, patient was given multiple doses of IV 

Ativan, to the point of unresponsiveness, and there was a concern about his 

airway protection and for that reason she was intubated and placed on mechanical

ventilator.  He was started on propofol and Versed infusion.  He is awaiting a 

bed in the ICU, he seen in the emergency department.  He is sedated and 

intubated, he is on assist-control mode of ventilation with a rate of 26, tidal 

volume 450, FiO2 100% and PEEP of 5, blood gas this morning shows pO2 of greater

than 400, pCO2 of 59, and pH of 7.19.  This was done on respiratory rate of 24 

which was subsequently increased to 26, and FiO2 was dropped down to 40%.  His 

chest x-ray today shows no active cardiopulmonary disease, ET tube 4 cm above 

the kenyetta.  Today's blood work has been reviewed, with little, is 5.5, 

hemoglobin is 12.9, serum sodium is 1:30 which is improved from 127 on 

admission, potassium is 4.2, chloride is 100, BUN is 16, creatinine 0.78.  

Troponin was 0.0-3, CRP was 5.9, proBNP was 250.  Lactic acid was 2.3, patient 

was given 1, 1/2 L in fluid boluses, currently he is on 0.0 missing a rate of 80

ML per hour, lactic acid has improved and is down to 0.7.  Owens catheter is in 

place, patient is producing adequate amount of clear and dilute urine.  This 

morning his fever is controlled, and he is 97.8, blood pressure is 98/71, he is 

in sinus mechanism with a controlled rate at 85.  He is resting comfortably, no 

evidence of grand mal seizures, he continues on thiamine replacement





Progress note dated 05/13/2022.





This is a 57-year-old male, who is seen today in room 267, and the intensive 

care unit.   Yesterday, he was seen in the emergency room.  He has a history of 

COPD, alcohol and marijuana use, and chronic tobacco use.  The patient was seen 

in the emergency room for alcohol withdrawal syndrome, and became very agitated,

and was started on Ativan, and required intubation and mechanical ventilation 

for airway protection.  He was also found to be positive for influenza A, and 

was started on Tamiflu.  The patient remains in the intensive care unit on the 

ventilator.  Vent settings include a volume assist control, rate 26, tidal 

volume 450, FiO2 40%, and PEEP of 5.  Blood gases show pO2 of 84, pCO2 44, and a

pH is 7.36.  The patient's on D5.45 at 80 mL an hour, propofol 50 mcg/kg/m, and 

no tube feedings.  Today, the patient will have a daily interruption of 

sedation, and a spontaneous breathing trial, on pressure support of 5, and CPAP 

of 5.  I believe the patient can likely be extubated.  White count 7.7, 

hemoglobin 12, hematocrit 35.6, platelet count 243,000.  Sodium 137, potassium 

3.6, chlorides 109, CO2 25, BUN 14, creatinine 0.6.  Albumin 3.1.  Chest x-ray, 

and my opinion, is normal.





Progress note dated 05/14/2022.  





This is a 57-year-old male, who was extubated yesterday, May 13.  Post 

extubation, the patient was quite agitated, and required different medications 

including IM Haldol, oral Seroquel, and IV Ativan.  Currently, the patient is on

dexmedetomidine at 1.2 mcg/kg/h.  He's on 4 L nasal cannula.  He is getting 

dextrose with half-normal saline at 80 mL an hour.  He is much less agitated 

today, and sleeping currently.  White count 3.6, hemoglobin 11.8, hematocrit 

34.8, platelet count 137,000.  Blood gases yesterday have been reviewed.  Sodium

137, potassium 4.2, chlorides 106, CO2 28, BUN 13, creatinine 0.66.  Anion gap 

is normal.  Chest x-ray shows some bilateral patchy infiltrates.





Objective





- Vital Signs


Vital signs: 


                                   Vital Signs











Temp  99.5 F   05/14/22 08:00


 


Pulse  100   05/14/22 08:25


 


Resp  25 H  05/14/22 08:00


 


BP  133/87   05/14/22 08:00


 


Pulse Ox  97   05/14/22 08:00








                                 Intake & Output











 05/13/22 05/14/22 05/14/22





 18:59 06:59 18:59


 


Intake Total 1511.949 2572.121 252.543


 


Output Total 910 460 135


 


Balance 352.248 733.121 117.543


 


Weight 71.5 kg 70 kg 


 


Intake:   


 


   1040 160


 


    Dextrose 5%-0.45% NaCl 1, 960 1040 160





    000 ml @ 80 mls/hr IV .   





    [id number] [facility] Rx#:[id number]   


 


  Intake, IV Titration 242.248 153.121 92.543





  Amount   


 


    ACETAMINOPHEN IV (For   





    ) 1,000 mg In Empty Bag 1   





    bag @ 400 mls/hr IVPB   





    Q6HR PRN Rx#:[id number]   


 


    Dexmedetomidine/0.9% NaCl 19.962 153.121 42.543





    (Pmx) 400 mcg In Empty   





    Bag 1 bag @ 0.4 MCG/KG/HR   





    7.15 mls/hr IV .Q14H [facility]   





    Rx#:[id number]   


 


    Potassium Chloride 10 meq 100  





    In Water For Injection 1   





    100ml.bag @ 100 mls/hr   





    IVPB Q1H [facility] Rx#:   





    [id number]   


 


    cefTRIAXone 1 gm In   50





    Sodium Chloride 0.9% 50   





    ml @ 100 mls/hr IVPB   





    Q24HR [facility] Rx#:[id number]   


 


    propofoL 1,000 mg In 22.286  





    Empty Bag 1 bag @ 20 MCG/   





    KG/MIN 9.253 mls/hr IV .   





    [id number] [facility] Rx#:[id number]   


 


  Other 60  


 


Output:   


 


  Gastric Drainage 300  


 


  Urine 610 460 135


 


Other:   


 


  Voiding Method Indwelling Catheter Indwelling Catheter Indwelling Catheter














- Exam





No acute distress, sleeping, and on nasal cannula at 4 L.





HEENT examination is grossly unremarkable.  





Neck supple.  Full range of motion.  No adenopathy thyromegaly or neck vein 

distention.





Cardiovascular examination reveals regular rhythm rate.  S1-S2 normal.  No S3 or

S4.  No discernible murmur noted.  Heart rate 100 bpm.





Lungs reveal mild scattered rhonchi.  No wheezes.  No crackles.  Breath sounds 

equal bilaterally.  Saturations are 98%.





Abdomen soft bowel sounds are heard.  No masses or tenderness.





Extremities are intact.  No cyanosis clubbing or edema.





Skin is without rash or lesion.





Neurologic examination is currently stable.





- Labs


CBC & Chem 7: 


                                 05/14/22 07:25





                                 05/14/22 07:25


Labs: 


                  Abnormal Lab Results - Last 24 Hours (Table)











  05/14/22 05/14/22 Range/Units





  07:25 07:25 


 


WBC  3.6 L   (3.8-10.6)  k/uL


 


RBC  2.87 L   (4.30-5.90)  m/uL


 


Hgb  11.8 L   (13.0-17.5)  gm/dL


 


Hct  34.8 L   (39.0-53.0)  %


 


MCV  121.4 H   (80.0-100.0)  fL


 


MCH  41.0 H   (25.0-35.0)  pg


 


Plt Count  137 L   (150-450)  k/uL


 


Macrocytosis  Marked A   


 


Glucose   107 H  (74-99)  mg/dL








                      Microbiology - Last 24 Hours (Table)











 05/11/22 23:17 Blood Culture - Preliminary





 Blood    No Growth after 48 hours


 


 05/12/22 04:25 Gram Stain - Preliminary





 Sputum Sputum Culture - Preliminary





    Streptococcus pneumoniae














Assessment and Plan


Assessment: 





Acute influenza A infection.





Hypercapnic respiratory failure secondary to excess Ativan use, for alcohol 

withdrawal, requiring intubation and mechanical ventilation on 05/11/2022, and 

successful extubation on 05/13/2022.





Acute alcohol withdrawal syndrome, and delirium tremens.





Mild lactic acidosis.





Hypomagnesemia.





Mild hyponatremia.





COPD from chronic tobacco use.





Chronic alcohol abuse.





History of marijuana use.


Plan: 





Plan dated 05/13/2022.





The patient will have a daily interruption of sedation today, and hopefully a 

spontaneous breathing trial, on pressure support of 5 and CPAP of 5.  The 

patient's labs, x-rays, and medications all reviewed.  Blood gases are 

excellent.  The patient remains on propofol at 50 mcg/kg/m.  Tube feedings have 

not yet been started.  If he is not extubated today, we will have to be started 

today.  The patient will continue on Ativan for potential alcohol withdrawal if 

necessary.  The patient was started on Tamiflu, 75 mg twice a day for a total of

5 days.  Additional recommendations and suggestions are forthcoming.  Prognosis 

is guarded.





Plan dated 05/14/2022.





The patient was extubated yesterday.  Last night, the patient gave the nurses a 

very difficult time, with significant agitation, and confusion.  The patient 

received IV Ativan, IM Haldol, and oral Seroquel.  The patient remains on 

dexmedetomidine.  Additional recommendations and suggestions are forthcoming.  

The patient remains on Tamiflu for influenza a infection.  Labs, x-rays, and 

medications are reviewed.  Prognosis is guarded.  We will continue to see the 

patient and make recommendations where appropriate.


Time with Patient: Greater than 30

## 2022-05-14 NOTE — P.PN
Subjective


Progress Note Date: 05/14/22





Pt still groggy, encephalopathic.  





Gen: awake, alert


HEENT: normocephalic, atraumatic, good hearing acuity, moist mucous membranes


Resp: good air exchange, breathing comfortably with no accessory muscle use


CVS: good distal perfusion x 4,


GI: soft, NTTP, ND


: no SPT, no CVAT, mancera catheter is present


MSK: no pitting edema, no clubbing


Neuro: non-focal, moving all extremities


Psych: cooperative, euthymic mood





Assessment/plan:





Sepsis secondary to influenza infection


-Continue with Tamiflu


-IVFs


-F/u cultures





Alcohol withdrawal syndrome


Delirium tremens


-CIWA protocol+ Ativan when necessary


-Thiamine, folate, multivitamin





Hypomagnesemia


-Replace and monitor





Acute hypoxic respiratory failure with baseline COPD


-Continue with Solu-Medrol





DVT prophylaxis


-Heparin subcu





The patient is admitted with an anticipated greater than 2 midnight stay for 

evaluation of sepsis, influenza


CODE STATUS: Full Code


Anticipated discharge date: 3-4 days


Anticipated discharge place: Home





Objective





- Vital Signs


Vital signs: 


                                   Vital Signs











Temp  99 F   05/14/22 12:00


 


Pulse  92   05/14/22 13:00


 


Resp  23   05/14/22 13:00


 


BP  124/86   05/14/22 13:00


 


Pulse Ox  95   05/14/22 13:00








                                 Intake & Output











 05/13/22 05/14/22 05/14/22





 18:59 06:59 18:59


 


Intake Total 8627.886 3035.121 820.248


 


Output Total 910 460 320


 


Balance 352.248 733.121 500.248


 


Weight 71.5 kg 70 kg 


 


Intake:   


 


   1040 480


 


    Dextrose 5%-0.45% NaCl 1, 960 1040 480





    000 ml @ 80 mls/hr IV .   





    [id number] [facility] Rx#:[id number]   


 


  Intake, IV Titration 242.248 153.121 340.248





  Amount   


 


    ACETAMINOPHEN IV (For   





    ) 1,000 mg In Empty Bag 1   





    bag @ 400 mls/hr IVPB   





    Q6HR PRN Rx#:[id number]   


 


    Dexmedetomidine/0.9% NaCl 19.962 153.121 140.248





    (Pmx) 400 mcg In Empty   





    Bag 1 bag @ 0.4 MCG/KG/HR   





    7.15 mls/hr IV .Q14H [facility]   





    Rx#:[id number]   


 


    Levofloxacin 750Mg-D5w   150





    Pmx 750 mg In Dextrose/   





    Water 1 150ml.bag @ 100   





    mls/hr IVPB Q24H [facility] Rx#:   





    [id number]   


 


    Potassium Chloride 10 meq 100  





    In Water For Injection 1   





    100ml.bag @ 100 mls/hr   





    IVPB Q1H [facility] Rx#:   





    [id number]   


 


    cefTRIAXone 1 gm In   50





    Sodium Chloride 0.9% 50   





    ml @ 100 mls/hr IVPB   





    Q24HR [facility] Rx#:[id number]   


 


    propofoL 1,000 mg In 22.286  





    Empty Bag 1 bag @ 20 MCG/   





    KG/MIN 9.253 mls/hr IV .   





    [id number] [facility] Rx#:[id number]   


 


  Other 60  


 


Output:   


 


  Gastric Drainage 300  


 


  Urine 610 460 320


 


Other:   


 


  Voiding Method Indwelling Catheter Indwelling Catheter Indwelling Catheter














- Labs


CBC & Chem 7: 


                                 05/14/22 07:25





                                 05/14/22 07:25


Labs: 


                  Abnormal Lab Results - Last 24 Hours (Table)











  05/14/22 05/14/22 Range/Units





  07:25 07:25 


 


WBC  3.6 L   (3.8-10.6)  k/uL


 


RBC  2.87 L   (4.30-5.90)  m/uL


 


Hgb  11.8 L   (13.0-17.5)  gm/dL


 


Hct  34.8 L   (39.0-53.0)  %


 


MCV  121.4 H   (80.0-100.0)  fL


 


MCH  41.0 H   (25.0-35.0)  pg


 


Plt Count  137 L   (150-450)  k/uL


 


Macrocytosis  Marked A   


 


Glucose   107 H  (74-99)  mg/dL








                      Microbiology - Last 24 Hours (Table)











 05/12/22 04:25 Gram Stain - Final





 Sputum Sputum Culture - Final





    Streptococcus pneumoniae


 


 05/11/22 23:17 Blood Culture - Preliminary





 Blood    No Growth after 48 hours

## 2022-05-14 NOTE — XR
EXAMINATION TYPE: XR chest 1V portable

 

DATE OF EXAM: 5/14/2022

 

Comparison: 5/13/2022

 

Clinical History: 57-year-old male influenza

 

Findings:

Old healed fracture deformity right mid clavicular shaft. Heart borderline in size. Patchy interstiti
al opacities bilaterally have increased in the interval. No pleural effusion. NG tube removed in the 
interval.

 

 

Impression:

Slight interval increasing mild patchy interstitial infiltrates. Correlate for atypical pneumonias.

## 2022-05-15 LAB
ANION GAP SERPL CALC-SCNC: 7 MMOL/L
BUN SERPL-SCNC: 12 MG/DL (ref 9–20)
CALCIUM SPEC-MCNC: 8.4 MG/DL (ref 8.4–10.2)
CHLORIDE SERPL-SCNC: 104 MMOL/L (ref 98–107)
CO2 SERPL-SCNC: 27 MMOL/L (ref 22–30)
GLUCOSE SERPL-MCNC: 103 MG/DL (ref 74–99)
MAGNESIUM SPEC-SCNC: 1.7 MG/DL (ref 1.6–2.3)
POTASSIUM SERPL-SCNC: 3.7 MMOL/L (ref 3.5–5.1)
SODIUM SERPL-SCNC: 138 MMOL/L (ref 137–145)

## 2022-05-15 PROCEDURE — 5A0945A ASSISTANCE WITH RESPIRATORY VENTILATION, 24-96 CONSECUTIVE HOURS, HIGH NASAL FLOW/VELOCITY: ICD-10-PCS

## 2022-05-15 RX ADMIN — POTASSIUM CHLORIDE SCH MLS/HR: 14.9 INJECTION, SOLUTION INTRAVENOUS at 01:31

## 2022-05-15 RX ADMIN — HALOPERIDOL LACTATE PRN MG: 5 INJECTION, SOLUTION INTRAMUSCULAR at 22:08

## 2022-05-15 RX ADMIN — IPRATROPIUM BROMIDE AND ALBUTEROL SULFATE SCH: .5; 3 SOLUTION RESPIRATORY (INHALATION) at 00:49

## 2022-05-15 RX ADMIN — CLEVIPIDINE SCH MLS/HR: 0.5 EMULSION INTRAVENOUS at 21:45

## 2022-05-15 RX ADMIN — DEXMEDETOMIDINE HYDROCHLORIDE SCH MLS/HR: 4 INJECTION, SOLUTION INTRAVENOUS at 02:15

## 2022-05-15 RX ADMIN — IPRATROPIUM BROMIDE AND ALBUTEROL SULFATE SCH ML: .5; 3 SOLUTION RESPIRATORY (INHALATION) at 15:42

## 2022-05-15 RX ADMIN — Medication SCH: at 09:58

## 2022-05-15 RX ADMIN — MAGNESIUM SULFATE IN DEXTROSE SCH MLS/HR: 10 INJECTION, SOLUTION INTRAVENOUS at 10:10

## 2022-05-15 RX ADMIN — DEXMEDETOMIDINE HYDROCHLORIDE SCH MLS/HR: 4 INJECTION, SOLUTION INTRAVENOUS at 06:10

## 2022-05-15 RX ADMIN — MAGNESIUM SULFATE IN DEXTROSE SCH MLS/HR: 10 INJECTION, SOLUTION INTRAVENOUS at 13:16

## 2022-05-15 RX ADMIN — IPRATROPIUM BROMIDE AND ALBUTEROL SULFATE SCH ML: .5; 3 SOLUTION RESPIRATORY (INHALATION) at 11:43

## 2022-05-15 RX ADMIN — DEXMEDETOMIDINE HYDROCHLORIDE SCH MLS/HR: 4 INJECTION, SOLUTION INTRAVENOUS at 10:11

## 2022-05-15 RX ADMIN — IPRATROPIUM BROMIDE AND ALBUTEROL SULFATE SCH ML: .5; 3 SOLUTION RESPIRATORY (INHALATION) at 21:25

## 2022-05-15 RX ADMIN — OSELTAMIVIR PHOSPHATE SCH: 75 CAPSULE ORAL at 09:58

## 2022-05-15 RX ADMIN — PANTOPRAZOLE SODIUM SCH MG: 40 INJECTION, POWDER, FOR SOLUTION INTRAVENOUS at 10:11

## 2022-05-15 RX ADMIN — IPRATROPIUM BROMIDE AND ALBUTEROL SULFATE SCH: .5; 3 SOLUTION RESPIRATORY (INHALATION) at 23:26

## 2022-05-15 RX ADMIN — DEXMEDETOMIDINE HYDROCHLORIDE SCH MLS/HR: 4 INJECTION, SOLUTION INTRAVENOUS at 15:26

## 2022-05-15 RX ADMIN — DEXMEDETOMIDINE HYDROCHLORIDE SCH MLS/HR: 4 INJECTION, SOLUTION INTRAVENOUS at 20:46

## 2022-05-15 RX ADMIN — LEVOFLOXACIN SCH MLS/HR: 750 INJECTION, SOLUTION INTRAVENOUS at 13:17

## 2022-05-15 RX ADMIN — POTASSIUM CHLORIDE SCH MLS/HR: 14.9 INJECTION, SOLUTION INTRAVENOUS at 18:16

## 2022-05-15 RX ADMIN — CLEVIPIDINE SCH MLS/HR: 0.5 EMULSION INTRAVENOUS at 13:22

## 2022-05-15 RX ADMIN — IPRATROPIUM BROMIDE AND ALBUTEROL SULFATE SCH ML: .5; 3 SOLUTION RESPIRATORY (INHALATION) at 07:41

## 2022-05-15 RX ADMIN — HALOPERIDOL LACTATE PRN MG: 5 INJECTION, SOLUTION INTRAMUSCULAR at 02:12

## 2022-05-15 RX ADMIN — IPRATROPIUM BROMIDE AND ALBUTEROL SULFATE SCH ML: .5; 3 SOLUTION RESPIRATORY (INHALATION) at 03:42

## 2022-05-15 RX ADMIN — CLEVIPIDINE SCH MLS/HR: 0.5 EMULSION INTRAVENOUS at 09:44

## 2022-05-15 NOTE — P.PN
Subjective


Progress Note Date: 05/15/22





Pt still groggy, encephalopathic.  





Gen: awake, alert


HEENT: normocephalic, atraumatic, good hearing acuity, moist mucous membranes


Resp: good air exchange, breathing comfortably with no accessory muscle use


CVS: good distal perfusion x 4,


GI: soft, NTTP, ND


: no SPT, no CVAT, mancera catheter is present


MSK: no pitting edema, no clubbing


Neuro: non-focal, moving all extremities


Psych: cooperative, euthymic mood





Assessment/plan:





Sepsis secondary to influenza infection


Community Acquired Pneumonia


-Continue with Tamiflu


-IVFs


-F/u cultures


-Ceftriaxone





Alcohol withdrawal syndrome


Delirium tremens


-CIWA protocol+ Ativan when necessary


-Thiamine, folate, multivitamin





Hypomagnesemia


-Replace and monitor





Acute hypoxic respiratory failure with baseline COPD


-Continue with Solu-Medrol





DVT prophylaxis


-Heparin subcu





The patient is admitted with an anticipated greater than 2 midnight stay for e

valuation of sepsis, influenza


CODE STATUS: Full Code


Anticipated discharge date: 3-4 days


Anticipated discharge place: Home





Objective





- Vital Signs


Vital signs: 


                                   Vital Signs











Temp  97.9 F   05/15/22 12:00


 


Pulse  88   05/15/22 13:00


 


Resp  21   05/15/22 13:00


 


BP  136/92   05/15/22 13:00


 


Pulse Ox  92 L  05/15/22 13:00








                                 Intake & Output











 05/14/22 05/15/22 05/15/22





 18:59 06:59 18:59


 


Intake Total 4046.305 7075.570 646.809


 


Output Total 620 615 775


 


Balance 720.000 549.570 -128.191


 


Weight  72.9 kg 


 


Intake:   


 


   960 560


 


    Dextrose 5%-0.45% NaCl 1, 880 960 560





    000 ml @ 80 mls/hr IV .   





    [id number] [facility] Rx#:[id number]   


 


  Intake, IV Titration 400.000 204.570 86.809





  Amount   


 


    Clevidipine Butyrate 25   18.467





    mg In Empty Bag 1 bag @ 1   





    MG/HR 2 mls/hr IV .Q24H   





    [facility] Rx#:[id number]   


 


    Dexmedetomidine/0.9% NaCl 200.000 204.570 68.342





    (Pmx) 400 mcg In Empty   





    Bag 1 bag @ 0.4 MCG/KG/HR   





    7.15 mls/hr IV .Q14H [facility]   





    Rx#:[id number]   


 


    Levofloxacin 750Mg-D5w 150  





    Pmx 750 mg In Dextrose/   





    Water 1 150ml.bag @ 100   





    mls/hr IVPB Q24H [facility] Rx#:   





    [id number]   


 


    cefTRIAXone 1 gm In 50  





    Sodium Chloride 0.9% 50   





    ml @ 100 mls/hr IVPB   





    Q24HR [facility] Rx#:[id number]   


 


  Oral 60  


 


Output:   


 


  Urine 620 615 775


 


Other:   


 


  Voiding Method Indwelling Catheter Indwelling Catheter Indwelling Catheter


 


  # Voids 180  














- Labs


CBC & Chem 7: 


                                 05/14/22 07:25





                                 05/15/22 07:11


Labs: 


                  Abnormal Lab Results - Last 24 Hours (Table)











  05/15/22 Range/Units





  07:11 


 


Glucose  103 H  (74-99)  mg/dL








                      Microbiology - Last 24 Hours (Table)











 05/11/22 23:17 Blood Culture - Preliminary





 Blood    No Growth after 72 hours


 


 05/12/22 04:25 Gram Stain - Final





 Sputum Sputum Culture - Final





    Streptococcus pneumoniae

## 2022-05-15 NOTE — P.PN
Subjective


Progress Note Date: 05/08/22


Principal diagnosis: 





Alcohol withdrawal syndrome.





 57-year-old male patient, current smoker, with history of COPD, EtOH and 

marijuana use, presented to the emergency department on 05/11/2022 for 

evaluation of cough, congestion, dyspnea and fever.  Patient tested positive for

influenza A, and tested negative for COVID-19 and influenza B.  He had a fever 

of 103 degrees Fahrenheit on presentation.  He was tachycardic with a rate of 

154, but in sinus mechanism.  Chest x-ray showed no active cardiopulmonary 

disease.  Patient was started on Tamiflu, IV steroids, and breathing treatments.

 However patient was starting to become agitated, and it was suspected that he 

was going through acute EtOH withdrawal, patient was given multiple doses of IV 

Ativan, to the point of unresponsiveness, and there was a concern about his 

airway protection and for that reason she was intubated and placed on mechanical

ventilator.  He was started on propofol and Versed infusion.  He is awaiting a 

bed in the ICU, he seen in the emergency department.  He is sedated and 

intubated, he is on assist-control mode of ventilation with a rate of 26, tidal 

volume 450, FiO2 100% and PEEP of 5, blood gas this morning shows pO2 of greater

than 400, pCO2 of 59, and pH of 7.19.  This was done on respiratory rate of 24 

which was subsequently increased to 26, and FiO2 was dropped down to 40%.  His 

chest x-ray today shows no active cardiopulmonary disease, ET tube 4 cm above 

the kenyetta.  Today's blood work has been reviewed, with little, is 5.5, 

hemoglobin is 12.9, serum sodium is 1:30 which is improved from 127 on 

admission, potassium is 4.2, chloride is 100, BUN is 16, creatinine 0.78.  

Troponin was 0.0-3, CRP was 5.9, proBNP was 250.  Lactic acid was 2.3, patient 

was given 1, 1/2 L in fluid boluses, currently he is on 0.0 missing a rate of 80

ML per hour, lactic acid has improved and is down to 0.7.  Owens catheter is in 

place, patient is producing adequate amount of clear and dilute urine.  This 

morning his fever is controlled, and he is 97.8, blood pressure is 98/71, he is 

in sinus mechanism with a controlled rate at 85.  He is resting comfortably, no 

evidence of grand mal seizures, he continues on thiamine replacement





Progress note dated 05/13/2022.





This is a 57-year-old male, who is seen today in room 267, and the intensive 

care unit.   Yesterday, he was seen in the emergency room.  He has a history of 

COPD, alcohol and marijuana use, and chronic tobacco use.  The patient was seen 

in the emergency room for alcohol withdrawal syndrome, and became very agitated,

and was started on Ativan, and required intubation and mechanical ventilation 

for airway protection.  He was also found to be positive for influenza A, and 

was started on Tamiflu.  The patient remains in the intensive care unit on the 

ventilator.  Vent settings include a volume assist control, rate 26, tidal 

volume 450, FiO2 40%, and PEEP of 5.  Blood gases show pO2 of 84, pCO2 44, and a

pH is 7.36.  The patient's on D5.45 at 80 mL an hour, propofol 50 mcg/kg/m, and 

no tube feedings.  Today, the patient will have a daily interruption of 

sedation, and a spontaneous breathing trial, on pressure support of 5, and CPAP 

of 5.  I believe the patient can likely be extubated.  White count 7.7, 

hemoglobin 12, hematocrit 35.6, platelet count 243,000.  Sodium 137, potassium 

3.6, chlorides 109, CO2 25, BUN 14, creatinine 0.6.  Albumin 3.1.  Chest x-ray, 

and my opinion, is normal.





Progress note dated 05/14/2022.  





This is a 57-year-old male, who was extubated yesterday, May 13.  Post 

extubation, the patient was quite agitated, and required different medications 

including IM Haldol, oral Seroquel, and IV Ativan.  Currently, the patient is on

dexmedetomidine at 1.2 mcg/kg/h.  He's on 4 L nasal cannula.  He is getting 

dextrose with half-normal saline at 80 mL an hour.  He is much less agitated 

today, and sleeping currently.  White count 3.6, hemoglobin 11.8, hematocrit 

34.8, platelet count 137,000.  Blood gases yesterday have been reviewed.  Sodium

137, potassium 4.2, chlorides 106, CO2 28, BUN 13, creatinine 0.66.  Anion gap 

is normal.  Chest x-ray shows some bilateral patchy infiltrates.





Progress note dated 05/15/2022.





This is a 57-year-old male, who was extubated on May 13.  The patient was very 

agitated post extubation, and required number different medications to calm him 

down.  This included IV Ativan, dexmedetomidine, Seroquel, and Haldol.  

Currently, he's on dexmedetomidine at 1 mcg/kg/h, 6 L nasal cannula, and half-

normal saline at 80 mL an hour.  We will add some Cleveprex, for elevated blood 

pressure.  The patient can continue getting IV Ativan or IM Haldol, as needed.  

Sodium 138, potassium 3.7, chlorides 104, CO2 27, anion gap 7, BUN 12, and 

creatinine 0.66.  Sputum sampling showing evidence of Streptococcus pneumoniae.





Objective





- Vital Signs


Vital signs: 


                                   Vital Signs











Temp  99.0 F   05/15/22 08:00


 


Pulse  95   05/15/22 10:00


 


Resp  22   05/15/22 10:00


 


BP  189/136   05/15/22 10:00


 


Pulse Ox  95   05/15/22 10:00








                                 Intake & Output











 05/14/22 05/15/22 05/15/22





 18:59 06:59 18:59


 


Intake Total 7421.371 1231.570 389.409


 


Output Total 620 615 225


 


Balance 720.000 549.570 164.409


 


Weight  72.9 kg 


 


Intake:   


 


   960 320


 


    Dextrose 5%-0.45% NaCl 1, 880 960 320





    000 ml @ 80 mls/hr IV .   





    [id number] [facility] Rx#:[id number]   


 


  Intake, IV Titration 400.000 204.570 69.409





  Amount   


 


    Clevidipine Butyrate 25   1.067





    mg In Empty Bag 1 bag @ 1   





    MG/HR 2 mls/hr IV .Q24H   





    [facility] Rx#:[id number]   


 


    Dexmedetomidine/0.9% NaCl 200.000 204.570 68.342





    (Pmx) 400 mcg In Empty   





    Bag 1 bag @ 0.4 MCG/KG/HR   





    7.15 mls/hr IV .Q14H [facility]   





    Rx#:[id number]   


 


    Levofloxacin 750Mg-D5w 150  





    Pmx 750 mg In Dextrose/   





    Water 1 150ml.bag @ 100   





    mls/hr IVPB Q24H [facility] Rx#:   





    [id number]   


 


    cefTRIAXone 1 gm In 50  





    Sodium Chloride 0.9% 50   





    ml @ 100 mls/hr IVPB   





    Q24HR [facility] Rx#:[id number]   


 


  Oral 60  


 


Output:   


 


  Urine 620 615 225


 


Other:   


 


  Voiding Method Indwelling Catheter Indwelling Catheter 


 


  # Voids 180  














- Exam





No acute distress, sleeping, and on nasal cannula at 6 L.





HEENT examination is grossly unremarkable.  





Neck supple.  Full range of motion.  No adenopathy thyromegaly or neck vein 

distention.





Cardiovascular examination reveals regular rhythm rate.  S1-S2 normal.  No S3 or

S4.  No discernible murmur noted.  Heart rate 95 bpm.





Lungs reveal mild scattered rhonchi.  No wheezes.  No crackles.  Sonorous respir

ations are noted.  Breath sounds equal bilaterally.  Saturations are 95 %.





Abdomen soft bowel sounds are heard.  No masses or tenderness.





Extremities are intact.  No cyanosis clubbing or edema.





Skin is without rash or lesion.





Neurologic examination is currently stable.





- Labs


CBC & Chem 7: 


                                 05/14/22 07:25





                                 05/15/22 07:11


Labs: 


                  Abnormal Lab Results - Last 24 Hours (Table)











  05/15/22 Range/Units





  07:11 


 


Glucose  103 H  (74-99)  mg/dL








                      Microbiology - Last 24 Hours (Table)











 05/11/22 23:17 Blood Culture - Preliminary





 Blood    No Growth after 72 hours


 


 05/12/22 04:25 Gram Stain - Final





 Sputum Sputum Culture - Final





    Streptococcus pneumoniae














Assessment and Plan


Assessment: 





Acute influenza A infection.





Hypercapnic respiratory failure secondary to excess Ativan use, for alcohol 

withdrawal, requiring intubation and mechanical ventilation on 05/11/2022, and 

successful extubation on 05/13/2022.





Acute alcohol withdrawal syndrome, and delirium tremens.





Streptococcus pneumoniae tracheobronchitis/pneumonia.





Mild lactic acidosis.





Hypomagnesemia.





Mild hyponatremia.





COPD from chronic tobacco use.





Chronic alcohol abuse.





History of marijuana use.


Plan: 





Plan dated 05/13/2022.





The patient will have a daily interruption of sedation today, and hopefully a 

spontaneous breathing trial, on pressure support of 5 and CPAP of 5.  The 

patient's labs, x-rays, and medications all reviewed.  Blood gases are 

excellent.  The patient remains on propofol at 50 mcg/kg/m.  Tube feedings have 

not yet been started.  If he is not extubated today, we will have to be started 

today.  The patient will continue on Ativan for potential alcohol withdrawal if 

necessary.  The patient was started on Tamiflu, 75 mg twice a day for a total of

5 days.  Additional recommendations and suggestions are forthcoming.  Prognosis 

is guarded.





Plan dated 05/14/2022.





The patient was extubated yesterday.  Last night, the patient gave the nurses a 

very difficult time, with significant agitation, and confusion.  The patient 

received IV Ativan, IM Haldol, and oral Seroquel.  The patient remains on 

dexmedetomidine.  Additional recommendations and suggestions are forthcoming.  

The patient remains on Tamiflu for influenza a infection.  Labs, x-rays, and 

medications are reviewed.  Prognosis is guarded.  We will continue to see the 

patient and make recommendations where appropriate.





Progress note dated 05/15/2022.





Currently, the patient's resting relatively comfortably in the intensive care 

unit.  The patient is on Levaquin.  That will cover his Streptococcus pneumoniae

in the sputum.  Remains on IV Ativan, and IM Haldol, as needed.  The patient is 

complaining his 5 days of Tamiflu for influenza A infection.  Labs, x-rays, 

medications are reviewed.  Prognosis is guarded.  The patient is started on 

Cleveprex for blood pressure control.  Additional recommendations and 

congestions are forthcoming.  We will continue to follow the patient and make 

recommendations where appropriate.


Time with Patient: Less than 30

## 2022-05-15 NOTE — XR
EXAMINATION TYPE: XR chest 1V portable

 

DATE OF EXAM: 5/15/2022

 

Comparison: 5/14/2022

 

Clinical History: 57-year-old male shortness of breath

 

Findings:

Heart upper limits of normal in size. Mild hyperinflation. Interstitial prominence. Patchy medial rig
ht basilar opacity. Old healed fracture deformity right clavicular shaft.

 

 

Impression:

COPD, similar interstitial densities, and slight increase in patchy medial right basilar atelectasis 
versus infiltrate. Correlate for any symptoms of potential pneumonia.

## 2022-05-16 LAB
ANION GAP SERPL CALC-SCNC: 2 MMOL/L
BUN SERPL-SCNC: 8 MG/DL (ref 9–20)
CALCIUM SPEC-MCNC: 8.4 MG/DL (ref 8.4–10.2)
CELLS COUNTED: 200
CHLORIDE SERPL-SCNC: 97 MMOL/L (ref 98–107)
CO2 SERPL-SCNC: 38 MMOL/L (ref 22–30)
EOSINOPHIL # BLD MANUAL: 0.07 K/UL (ref 0–0.7)
ERYTHROCYTE [DISTWIDTH] IN BLOOD BY AUTOMATED COUNT: 3.24 M/UL (ref 4.3–5.9)
ERYTHROCYTE [DISTWIDTH] IN BLOOD: 14.7 % (ref 11.5–15.5)
GLUCOSE SERPL-MCNC: 101 MG/DL (ref 74–99)
HCT VFR BLD AUTO: 39.1 % (ref 39–53)
HGB BLD-MCNC: 13.2 GM/DL (ref 13–17.5)
LYMPHOCYTES # BLD MANUAL: 1.14 K/UL (ref 1–4.8)
MCH RBC QN AUTO: 40.9 PG (ref 25–35)
MCHC RBC AUTO-ENTMCNC: 33.9 G/DL (ref 31–37)
MCV RBC AUTO: 120.7 FL (ref 80–100)
METAMYELOCYTES # BLD: 0.13 K/UL
MONOCYTES # BLD MANUAL: 1.41 K/UL (ref 0–1)
MYELOCYTES # BLD MANUAL: 0.13 K/UL
NEUTROPHILS NFR BLD MANUAL: 58 %
NEUTS SEG # BLD MANUAL: 3.9 K/UL (ref 1.3–7.7)
PLATELET # BLD AUTO: 247 K/UL (ref 150–450)
POTASSIUM SERPL-SCNC: 3.4 MMOL/L (ref 3.5–5.1)
SODIUM SERPL-SCNC: 137 MMOL/L (ref 137–145)
WBC # BLD AUTO: 6.7 K/UL (ref 3.8–10.6)

## 2022-05-16 RX ADMIN — POTASSIUM CHLORIDE SCH MLS/HR: 7.46 INJECTION, SOLUTION INTRAVENOUS at 18:32

## 2022-05-16 RX ADMIN — MAGNESIUM SULFATE IN DEXTROSE SCH MLS/HR: 10 INJECTION, SOLUTION INTRAVENOUS at 12:32

## 2022-05-16 RX ADMIN — DEXMEDETOMIDINE HYDROCHLORIDE SCH MLS/HR: 4 INJECTION, SOLUTION INTRAVENOUS at 17:09

## 2022-05-16 RX ADMIN — CLEVIPIDINE SCH MLS/HR: 0.5 EMULSION INTRAVENOUS at 00:49

## 2022-05-16 RX ADMIN — IPRATROPIUM BROMIDE AND ALBUTEROL SULFATE SCH ML: .5; 3 SOLUTION RESPIRATORY (INHALATION) at 08:05

## 2022-05-16 RX ADMIN — DEXMEDETOMIDINE HYDROCHLORIDE SCH MLS/HR: 4 INJECTION, SOLUTION INTRAVENOUS at 15:00

## 2022-05-16 RX ADMIN — HALOPERIDOL LACTATE PRN MG: 5 INJECTION, SOLUTION INTRAMUSCULAR at 04:24

## 2022-05-16 RX ADMIN — CLEVIPIDINE SCH MLS/HR: 0.5 EMULSION INTRAVENOUS at 15:00

## 2022-05-16 RX ADMIN — PANTOPRAZOLE SODIUM SCH MG: 40 INJECTION, POWDER, FOR SOLUTION INTRAVENOUS at 09:06

## 2022-05-16 RX ADMIN — POTASSIUM CHLORIDE SCH MLS/HR: 7.46 INJECTION, SOLUTION INTRAVENOUS at 19:51

## 2022-05-16 RX ADMIN — POTASSIUM CHLORIDE SCH MLS/HR: 7.46 INJECTION, SOLUTION INTRAVENOUS at 21:24

## 2022-05-16 RX ADMIN — CLEVIPIDINE SCH MLS/HR: 0.5 EMULSION INTRAVENOUS at 07:01

## 2022-05-16 RX ADMIN — DEXMEDETOMIDINE HYDROCHLORIDE SCH MLS/HR: 4 INJECTION, SOLUTION INTRAVENOUS at 05:36

## 2022-05-16 RX ADMIN — DEXMEDETOMIDINE HYDROCHLORIDE SCH MLS/HR: 4 INJECTION, SOLUTION INTRAVENOUS at 01:54

## 2022-05-16 RX ADMIN — POTASSIUM CHLORIDE SCH MLS/HR: 7.46 INJECTION, SOLUTION INTRAVENOUS at 17:12

## 2022-05-16 RX ADMIN — IPRATROPIUM BROMIDE AND ALBUTEROL SULFATE SCH ML: .5; 3 SOLUTION RESPIRATORY (INHALATION) at 12:01

## 2022-05-16 RX ADMIN — IPRATROPIUM BROMIDE AND ALBUTEROL SULFATE SCH: .5; 3 SOLUTION RESPIRATORY (INHALATION) at 21:59

## 2022-05-16 RX ADMIN — IPRATROPIUM BROMIDE AND ALBUTEROL SULFATE SCH ML: .5; 3 SOLUTION RESPIRATORY (INHALATION) at 16:05

## 2022-05-16 RX ADMIN — MAGNESIUM SULFATE IN DEXTROSE SCH MLS/HR: 10 INJECTION, SOLUTION INTRAVENOUS at 09:05

## 2022-05-16 RX ADMIN — CLEVIPIDINE SCH MLS/HR: 0.5 EMULSION INTRAVENOUS at 20:52

## 2022-05-16 RX ADMIN — LEVOFLOXACIN SCH MLS/HR: 750 INJECTION, SOLUTION INTRAVENOUS at 14:08

## 2022-05-16 RX ADMIN — POTASSIUM CHLORIDE SCH MLS/HR: 14.9 INJECTION, SOLUTION INTRAVENOUS at 05:01

## 2022-05-16 NOTE — P.PN
Subjective


Progress Note Date: 05/16/22


Principal diagnosis: 


 Influenza A infection, acute EtOH withdrawal





57-year-old male patient, current smoker, with history of COPD, EtOH and 

marijuana use, presented to the emergency department on 05/11/2022 for 

evaluation of cough, congestion, dyspnea and fever.  Patient tested positive for

influenza A, and tested negative for COVID-19 and influenza B.  He had a fever 

of 103 degrees Fahrenheit on presentation.  He was tachycardic with a rate of 

154, but in sinus mechanism.  Chest x-ray showed no active cardiopulmonary 

disease.  Patient was started on Tamiflu, IV steroids, and breathing treatments.

 However patient was starting to become agitated, and it was suspected that he 

was going through acute EtOH withdrawal, patient was given multiple doses of IV 

Ativan, to the point of unresponsiveness, and there was a concern about his 

airway protection and for that reason she was intubated and placed on mechanical

ventilator.  He was started on propofol and Versed infusion.  He is awaiting a 

bed in the ICU, he seen in the emergency department.  He is sedated and intubate

d, he is on assist-control mode of ventilation with a rate of 26, tidal volume 

450, FiO2 100% and PEEP of 5, blood gas this morning shows pO2 of greater than 

400, pCO2 of 59, and pH of 7.19.  This was done on respiratory rate of 24 which 

was subsequently increased to 26, and FiO2 was dropped down to 40%.  His chest 

x-ray today shows no active cardiopulmonary disease, ET tube 4 cm above the 

kenyetta.  Today's blood work has been reviewed, with little, is 5.5, hemoglobin 

is 12.9, serum sodium is 1:30 which is improved from 127 on admission, potassium

is 4.2, chloride is 100, BUN is 16, creatinine 0.78.  Troponin was 0.0-3, CRP 

was 5.9, proBNP was 250.  Lactic acid was 2.3, patient was given 1, 1/2 L in 

fluid boluses, currently he is on 0.0 missing a rate of 80 ML per hour, lactic 

acid has improved and is down to 0.7.  Owens catheter is in place, patient is 

producing adequate amount of clear and dilute urine.  This morning his fever is 

controlled, and he is 97.8, blood pressure is 98/71, he is in sinus mechanism 

with a controlled rate at 85.  He is resting comfortably, no evidence of grand 

mal seizures, he continues on thiamine replacement.





On 05/16/2022 patient seen in follow-up in the intensive care unit.  Patient was

successfully weaned and extubated from mechanical ventilator on 05/13/2022.  His

alcohol withdrawal symptoms seem to be improving, patient remains on Precedex 

infusion, which is currently down to 0.8 mics per kilo per hour.  Patient has 

required some intermittent doses of Ativan, required only 2 mg of Ativan last 

night, in addition to a single dose of morphine yesterday in the afternoon.  His

resting comfortably, he is drowsy, but he does respond to verbal stimulation, 

follows simple command.  Does not appear to be in any acute distress.  Also 

remains on Cleviprex at 4 mg per hour, and D5 half-normal saline at a rate of 80

ML per hour.  Currently on 8 L of oxygen his pulse ox is 96%.  Yesterday's chest

x-ray was showing COPD, some interstitial densities, and patchy medial right 

basilar atelectasis versus infiltrate with the possibility of potential pne

umonia possibly related to aspiration.  Today's lab 7 reviewed showing 

electrolytes and renal profile within normal limits.  Magnesium is 1.7 it is 

being replaced per protocol.  Sputum culture showed Streptococcus pneumonia, 

blood cultures have been negative.  Patient has been started on Levaquin for 

strep pneumonia.  T-max in the last 24 hours was 99.7F, blood pressure is been 

stable.  He is in sinus mechanism with a rate of 77.  No nausea vomiting or 

diarrhea, abdomen is soft.











Objective





- Vital Signs


Vital signs: 


                                   Vital Signs











Temp  99 F   05/16/22 08:30


 


Pulse  77   05/16/22 10:00


 


Resp  18   05/16/22 10:00


 


BP  121/92   05/16/22 10:00


 


Pulse Ox  94 L  05/16/22 10:00








                                 Intake & Output











 05/15/22 05/16/22 05/16/22





 18:59 06:59 18:59


 


Intake Total 9411.089 3229.312 427.356


 


Output Total 1625 3025 525


 


Balance -134.347 -1706.688 -97.644


 


Weight  66.5 kg 66.5 kg


 


Intake:   


 


  IV 1310 960 320


 


    Dextrose 5%-0.45% NaCl 1, 960 960 320





    000 ml @ 80 mls/hr IV .   





    [id number] [facility] Rx#:[id number]   


 


    levaquin 150  


 


    magnesium 200  


 


  Intake, IV Titration 180.653 358.312 107.356





  Amount   


 


    Clevidipine Butyrate 25 18.467 77.566 46.133





    mg In Empty Bag 1 bag @ 1   





    MG/HR 2 mls/hr IV .Q24H   





    [facility] Rx#:[id number]   


 


    Dexmedetomidine/0.9% NaCl 162.186 280.746 61.223





    (Pmx) 400 mcg In Empty   





    Bag 1 bag @ 0.4 MCG/KG/HR   





    7.15 mls/hr IV .Q14H [facility]   





    Rx#:[id number]   


 


Output:   


 


  Urine 1625 3025 525


 


Other:   


 


  Voiding Method Indwelling Catheter Indwelling Catheter 














- Exam


 GENERAL EXAM: Drowsy, but arousable to voice, follows simple commands, 

57-year-old white male, 7 L of oxygen pulse ox is 94% comfortable in no apparent

distress.


HEAD: Normocephalic/atraumatic.


EYES: Normal reaction of pupils, equal size.  Conjunctiva pink, sclera white.


NOSE: Clear with pink turbinates.


THROAT: No erythema or exudates.


NECK: No masses, no JVD, no thyroid enlargement, no adenopathy.


CHEST: No chest wall deformity.  Symmetrical expansion. 


LUNGS: Equal air entry with no crackles, wheeze, rhonchi or dullness.


CVS: Regular rate and rhythm, normal S1 and S2, no gallops, no murmurs, no rubs


ABDOMEN: Soft, nontender.  No hepatosplenomegaly, normal bowel sounds, no 

guarding or rigidity.


EXTREMITIES: No clubbing, no edema, no cyanosis, 2+ pulses and upper and lower 

extremities.


MUSCULOSKELETAL: Muscle strength and tone normal.


SPINE: No scoliosis or deformity


SKIN: No rashes


CENTRAL NERVOUS SYSTEM: Drowsy but responds to verbal stimulation.  No focal 

deficits, tone is normal in all 4 extremities.














- Labs


CBC & Chem 7: 


                                 05/14/22 07:25





                                 05/15/22 07:11


Labs: 


                      Microbiology - Last 24 Hours (Table)











 05/11/22 23:17 Blood Culture - Preliminary





 Blood    No Growth after 96 hours














Assessment and Plan


Plan: 


 Assessment:





#1.  Acute influenza A infection





#2.  Acute pneumonia, sputum culture showed strep pneumonia, currently on 

Levaquin 





#3.  Acute hypercapnic respiratory failure related to  excess Ativan used to 

control acute alcohol withdrawal, requiring intubation and placement on mechani

lana ventilator on 05/11/2022, extubated on 05/13/2022





#4.  Acute alcohol withdrawal, and delirium tremens





#5.  Mild lactic acidosis, corrected with IV fluids





#6.  Hypomagnesemia





#7.  Mild hyponatremia, likely hypovolemic, improved with IV hydration





#8.  COPD





#9.  Chronic smoker





#10.  EtOH abuse





#11.  Marijuana use





Plan:





Continue current antibiotics


Maintain aspiration precautions


Wean Precedex as tolerated


Continue CIWA protocol monitoring


Continue Cleviprex for BP control until the patient is safe to take oral 

medications


Weaning FiO2 to maintain O2 saturations at or above 92%


Continue breathing treatments


Follow-up labs tomorrow


Continue monitoring in the intensive care unit


Maintain safety precautions





I have personally seen and examined the patient, performed the documentation and

the assessment and  plan as written.  Number of minutes spent on the visit: [15]

 








Time with Patient: Less than 30

## 2022-05-16 NOTE — P.PN
Subjective


Progress Note Date: 05/16/22





Pt still groggy, encephalopathic.  





Gen: awake, alert


HEENT: normocephalic, atraumatic, good hearing acuity, moist mucous membranes


Resp: good air exchange, breathing comfortably with no accessory muscle use


CVS: good distal perfusion x 4,


GI: soft, NTTP, ND


: no SPT, no CVAT, mancera catheter is present


MSK: no pitting edema, no clubbing


Neuro: non-focal, moving all extremities


Psych: cooperative, euthymic mood





Assessment/plan:





Sepsis secondary to influenza infection


Community Acquired Pneumonia


-Continue with Tamiflu


-IVFs


-F/u cultures


-Ceftriaxone





Alcohol withdrawal syndrome


Delirium tremens


-CIWA protocol+ Ativan when necessary


-Thiamine, folate, multivitamin





Hypomagnesemia


-Replace and monitor





Acute hypoxic respiratory failure with baseline COPD


-Continue with Solu-Medrol





DVT prophylaxis


-Heparin subcu





The patient is admitted with an anticipated greater than 2 midnight stay for e

valuation of sepsis, influenza


CODE STATUS: Full Code


Anticipated discharge date: 3-4 days


Anticipated discharge place: Home 





Objective





- Vital Signs


Vital signs: 


                                   Vital Signs











Temp  99 F   05/16/22 08:30


 


Pulse  84   05/16/22 12:30


 


Resp  16   05/16/22 12:38


 


BP  145/112   05/16/22 12:30


 


Pulse Ox  92 L  05/16/22 12:30








                                 Intake & Output











 05/15/22 05/16/22 05/16/22





 18:59 06:59 18:59


 


Intake Total 9940.690 4014.312 627.356


 


Output Total 1625 3025 770


 


Balance -134.347 -1706.688 -142.644


 


Weight  66.5 kg 66.5 kg


 


Intake:   


 


  IV 1310 960 520


 


    Dextrose 5%-0.45% NaCl 1, 960 960 320





    000 ml @ 80 mls/hr IV .   





    [id number] JESUS MANUEL Rx#:[id number]   


 


    levaquin 150  


 


    magnesium 200  200


 


  Intake, IV Titration 180.653 358.312 107.356





  Amount   


 


    Clevidipine Butyrate 25 18.467 77.566 46.133





    mg In Empty Bag 1 bag @ 1   





    MG/HR 2 mls/hr IV .Q24H   





    JESUS MANUEL Rx#:[id number]   


 


    Dexmedetomidine/0.9% NaCl 162.186 280.746 61.223





    (Pmx) 400 mcg In Empty   





    Bag 1 bag @ 0.4 MCG/KG/HR   





    7.15 mls/hr IV .Q14H [facility]   





    Rx#:[id number]   


 


Output:   


 


  Urine 1625 3025 770


 


Other:   


 


  Voiding Method Indwelling Catheter Indwelling Catheter Indwelling Catheter














- Labs


CBC & Chem 7: 


                                 05/16/22 06:53





                                 05/16/22 13:06


Labs: 


                  Abnormal Lab Results - Last 24 Hours (Table)











  05/16/22 05/16/22 Range/Units





  06:53 13:06 


 


RBC  3.24 L   (4.30-5.90)  m/uL


 


MCV  120.7 H   (80.0-100.0)  fL


 


MCH  40.9 H   (25.0-35.0)  pg


 


Monocytes # (Manual)  1.41 H   (0-1.0)  k/uL


 


Metamyelocytes # (Man)  0.13 H   (0)  k/uL


 


Myelocytes # (Manual)  0.13 H   (0)  k/uL


 


Nucleated RBCs  1 H   (0-0)  /100 WBC


 


Macrocytosis  Marked A   


 


Potassium   3.4 L  (3.5-5.1)  mmol/L


 


Chloride   97 L  ()  mmol/L


 


Carbon Dioxide   38 H  (22-30)  mmol/L


 


BUN   8 L  (9-20)  mg/dL


 


Creatinine   0.57 L  (0.66-1.25)  mg/dL


 


Glucose   101 H  (74-99)  mg/dL








                      Microbiology - Last 24 Hours (Table)











 05/11/22 23:17 Blood Culture - Preliminary





 Blood    No Growth after 96 hours

## 2022-05-17 LAB
ANION GAP SERPL CALC-SCNC: 8 MMOL/L
BUN SERPL-SCNC: 10 MG/DL (ref 9–20)
CALCIUM SPEC-MCNC: 8.5 MG/DL (ref 8.4–10.2)
CELLS COUNTED: 200
CHLORIDE SERPL-SCNC: 100 MMOL/L (ref 98–107)
CO2 SERPL-SCNC: 30 MMOL/L (ref 22–30)
EOSINOPHIL # BLD MANUAL: 0.09 K/UL (ref 0–0.7)
ERYTHROCYTE [DISTWIDTH] IN BLOOD BY AUTOMATED COUNT: 3.57 M/UL (ref 4.3–5.9)
ERYTHROCYTE [DISTWIDTH] IN BLOOD: 14.6 % (ref 11.5–15.5)
GLUCOSE SERPL-MCNC: 100 MG/DL (ref 74–99)
HCT VFR BLD AUTO: 42.4 % (ref 39–53)
HGB BLD-MCNC: 14.3 GM/DL (ref 13–17.5)
LYMPHOCYTES # BLD MANUAL: 2.05 K/UL (ref 1–4.8)
MAGNESIUM SPEC-SCNC: 1.8 MG/DL (ref 1.6–2.3)
MCH RBC QN AUTO: 40.2 PG (ref 25–35)
MCHC RBC AUTO-ENTMCNC: 33.8 G/DL (ref 31–37)
MCV RBC AUTO: 119 FL (ref 80–100)
METAMYELOCYTES # BLD: 0.18 K/UL
MONOCYTES # BLD MANUAL: 2.4 K/UL (ref 0–1)
MYELOCYTES # BLD MANUAL: 0.18 K/UL
NEUTROPHILS NFR BLD MANUAL: 42 %
NEUTS SEG # BLD MANUAL: 4.1 K/UL (ref 1.3–7.7)
PLATELET # BLD AUTO: 350 K/UL (ref 150–450)
POTASSIUM SERPL-SCNC: 3.4 MMOL/L (ref 3.5–5.1)
SODIUM SERPL-SCNC: 138 MMOL/L (ref 137–145)
WBC # BLD AUTO: 8.9 K/UL (ref 3.8–10.6)

## 2022-05-17 RX ADMIN — POTASSIUM CHLORIDE SCH MLS/HR: 7.46 INJECTION, SOLUTION INTRAVENOUS at 14:42

## 2022-05-17 RX ADMIN — MAGNESIUM SULFATE IN DEXTROSE SCH MLS/HR: 10 INJECTION, SOLUTION INTRAVENOUS at 09:12

## 2022-05-17 RX ADMIN — CLEVIPIDINE SCH MLS/HR: 0.5 EMULSION INTRAVENOUS at 05:40

## 2022-05-17 RX ADMIN — POTASSIUM CHLORIDE SCH MLS/HR: 7.46 INJECTION, SOLUTION INTRAVENOUS at 20:25

## 2022-05-17 RX ADMIN — POTASSIUM CHLORIDE SCH MLS/HR: 14.9 INJECTION, SOLUTION INTRAVENOUS at 19:10

## 2022-05-17 RX ADMIN — POTASSIUM CHLORIDE SCH MLS/HR: 7.46 INJECTION, SOLUTION INTRAVENOUS at 09:13

## 2022-05-17 RX ADMIN — POTASSIUM CHLORIDE SCH MLS/HR: 7.46 INJECTION, SOLUTION INTRAVENOUS at 10:32

## 2022-05-17 RX ADMIN — PANTOPRAZOLE SODIUM SCH MG: 40 INJECTION, POWDER, FOR SOLUTION INTRAVENOUS at 09:09

## 2022-05-17 RX ADMIN — IPRATROPIUM BROMIDE AND ALBUTEROL SULFATE SCH: .5; 3 SOLUTION RESPIRATORY (INHALATION) at 19:52

## 2022-05-17 RX ADMIN — DEXMEDETOMIDINE HYDROCHLORIDE SCH MLS/HR: 4 INJECTION, SOLUTION INTRAVENOUS at 09:09

## 2022-05-17 RX ADMIN — POTASSIUM CHLORIDE SCH MLS/HR: 14.9 INJECTION, SOLUTION INTRAVENOUS at 01:36

## 2022-05-17 RX ADMIN — IPRATROPIUM BROMIDE AND ALBUTEROL SULFATE SCH: .5; 3 SOLUTION RESPIRATORY (INHALATION) at 15:42

## 2022-05-17 RX ADMIN — DEXMEDETOMIDINE HYDROCHLORIDE SCH MLS/HR: 4 INJECTION, SOLUTION INTRAVENOUS at 01:35

## 2022-05-17 RX ADMIN — IPRATROPIUM BROMIDE AND ALBUTEROL SULFATE SCH: .5; 3 SOLUTION RESPIRATORY (INHALATION) at 08:07

## 2022-05-17 RX ADMIN — POTASSIUM CHLORIDE SCH: 14.9 INJECTION, SOLUTION INTRAVENOUS at 19:10

## 2022-05-17 RX ADMIN — DEXMEDETOMIDINE HYDROCHLORIDE SCH MLS/HR: 4 INJECTION, SOLUTION INTRAVENOUS at 05:40

## 2022-05-17 RX ADMIN — IPRATROPIUM BROMIDE AND ALBUTEROL SULFATE SCH: .5; 3 SOLUTION RESPIRATORY (INHALATION) at 11:44

## 2022-05-17 RX ADMIN — POTASSIUM CHLORIDE SCH MLS/HR: 7.46 INJECTION, SOLUTION INTRAVENOUS at 23:01

## 2022-05-17 RX ADMIN — POTASSIUM CHLORIDE SCH MLS/HR: 7.46 INJECTION, SOLUTION INTRAVENOUS at 11:48

## 2022-05-17 RX ADMIN — MAGNESIUM SULFATE IN DEXTROSE SCH MLS/HR: 10 INJECTION, SOLUTION INTRAVENOUS at 10:32

## 2022-05-17 RX ADMIN — LEVOFLOXACIN SCH MLS/HR: 750 INJECTION, SOLUTION INTRAVENOUS at 12:42

## 2022-05-17 NOTE — P.PN
Subjective


Progress Note Date: 05/17/22


Principal diagnosis: 





Acute alcohol withdrawal and influenza A infection


57-year-old male patient, current smoker, with history of COPD, EtOH and 

marijuana use, presented to the emergency department on 05/11/2022 for 

evaluation of cough, congestion, dyspnea and fever.  Patient tested positive for

influenza A, and tested negative for COVID-19 and influenza B.  He had a fever 

of 103 degrees Fahrenheit on presentation.  He was tachycardic with a rate of 

154, but in sinus mechanism.  Chest x-ray showed no active cardiopulmonary 

disease.  Patient was started on Tamiflu, IV steroids, and breathing treatments.

 However patient was starting to become agitated, and it was suspected that he 

was going through acute EtOH withdrawal, patient was given multiple doses of IV 

Ativan, to the point of unresponsiveness, and there was a concern about his 

airway protection and for that reason she was intubated and placed on mechanical

ventilator.  He was started on propofol and Versed infusion.  He is awaiting a 

bed in the ICU, he seen in the emergency department.  He is sedated and int

ubated, he is on assist-control mode of ventilation with a rate of 26, tidal 

volume 450, FiO2 100% and PEEP of 5, blood gas this morning shows pO2 of greater

than 400, pCO2 of 59, and pH of 7.19.  This was done on respiratory rate of 24 

which was subsequently increased to 26, and FiO2 was dropped down to 40%.  His 

chest x-ray today shows no active cardiopulmonary disease, ET tube 4 cm above 

the kenyetta.  Today's blood work has been reviewed, with little, is 5.5, 

hemoglobin is 12.9, serum sodium is 1:30 which is improved from 127 on 

admission, potassium is 4.2, chloride is 100, BUN is 16, creatinine 0.78.  

Troponin was 0.0-3, CRP was 5.9, proBNP was 250.  Lactic acid was 2.3, patient 

was given 1, 1/2 L in fluid boluses, currently he is on 0.0 missing a rate of 80

ML per hour, lactic acid has improved and is down to 0.7.  Owens catheter is in 

place, patient is producing adequate amount of clear and dilute urine.  This 

morning his fever is controlled, and he is 97.8, blood pressure is 98/71, he is 

in sinus mechanism with a controlled rate at 85.  He is resting comfortably, no 

evidence of grand mal seizures, he continues on thiamine replacement.





On 05/16/2022 patient seen in follow-up in the intensive care unit.  Patient was

successfully weaned and extubated from mechanical ventilator on 05/13/2022.  His

alcohol withdrawal symptoms seem to be improving, patient remains on Precedex 

infusion, which is currently down to 0.8 mics per kilo per hour.  Patient has 

required some intermittent doses of Ativan, required only 2 mg of Ativan last 

night, in addition to a single dose of morphine yesterday in the afternoon.  His

resting comfortably, he is drowsy, but he does respond to verbal stimulation, 

follows simple command.  Does not appear to be in any acute distress.  Also 

remains on Cleviprex at 4 mg per hour, and D5 half-normal saline at a rate of 80

ML per hour.  Currently on 8 L of oxygen his pulse ox is 96%.  Yesterday's chest

x-ray was showing COPD, some interstitial densities, and patchy medial right 

basilar atelectasis versus infiltrate with the possibility of potential 

pneumonia possibly related to aspiration.  Today's lab 7 reviewed showing 

electrolytes and renal profile within normal limits.  Magnesium is 1.7 it is 

being replaced per protocol.  Sputum culture showed Streptococcus pneumonia, 

blood cultures have been negative.  Patient has been started on Levaquin for 

strep pneumonia.  T-max in the last 24 hours was 99.7F, blood pressure is been 

stable.  He is in sinus mechanism with a rate of 77.  No nausea vomiting or 

diarrhea, abdomen is soft.





Reevaluated today on 5/17/2022, patient remains in the ICU, remains on 2 L nasal

cannula with O2 sat showed 94%, remains on Precedex and this is being weaned 

down, he is on Cleviprex at 2 mg per hour.  Patient is requiring intermittently 

Ativan and Haldol.  But overall the patient is doing much better.  Remains on 

Levaquin.  Patient is drowsy, but seems to be a bit more cooperative today 

compared to the last few days.  Sputum was positive for Streptococcus pneumonia,

chest x-ray showed minimal bibasilar infiltrates blood cultures have been 

negative.











Objective





- Vital Signs


Vital signs: 


                                   Vital Signs











Temp  97.1 F L  05/17/22 08:00


 


Pulse  73   05/17/22 12:00


 


Resp  24   05/17/22 12:00


 


BP  107/80   05/17/22 12:00


 


Pulse Ox  95   05/17/22 12:00








                                 Intake & Output











 05/16/22 05/17/22 05/17/22





 18:59 06:59 18:59


 


Intake Total 5265.048 3885.2 792.422


 


Output Total 1385 2105 203


 


Balance 47.669 -659.8 589.422


 


Weight 66.5 kg 64.5 kg 


 


Intake:   


 


  IV 1190 1160 655


 


    Dextrose 5%-0.45% NaCl 1, 640 960 155





    000 ml @ 80 mls/hr IV .   





    [id number] JESUS MANUEL Rx#:[id number]   


 


    Magnesium Sulfate-D5w Pmx   200





    1 gm In Dextrose/Water 1   





    100ml.bag @ 100 mls/hr   





    IVPB Q1H JESUS MANUEL Rx#:   





    [id number]   


 


    Potassium Chloride 10 meq 200 200 300





    In Water For Injection 1   





    100ml.bag @ 100 mls/hr   





    IVPB Q1HR JESUS MANUEL Rx#:   





    [id number]   


 


    levaquin 150  


 


    magnesium 200  


 


  Intake, IV Titration 242.669 285.2 137.422





  Amount   


 


    Clevidipine Butyrate 25 96.133 85.2 28.533





    mg In Empty Bag 1 bag @ 1   





    MG/HR 2 mls/hr IV .Q24H   





    JESUS MANUEL Rx#:[id number]   


 


    Dexmedetomidine/0.9% NaCl 146.536 200 108.889





    (Pmx) 400 mcg In Empty   





    Bag 1 bag @ 0.4 MCG/KG/HR   





    7.15 mls/hr IV .Q14H JESUS MANUEL   





    Rx#:[id number]   


 


Output:   


 


  Urine 1385 2105 203


 


Other:   


 


  Voiding Method Indwelling Catheter Indwelling Catheter Indwelling Catheter














- Exam





Physical Exam: Revealed 57-year-old white male, sedated, lethargic, but 

arousable.


HEENT:[Neck is supple.] [No neck masses.] [No thyromegaly.] [No JVD.]


Chest: [Minimal crackles at the bases.  No rhonchi and no wheezes.


Cardiac Exam: [Normal S1 and S2, no S3 gallop, no murmur.]


Abdomen: [Soft, nontender,  no megaly, no rebound, no guarding, normal bowel 

sounds.]


Extremities: [No clubbing, no edema, no cyanosis.]


Neurological Exam: Lethargic, arousable, does not follow instructions.


Psychiatric: Could not fully assess.


Musculoskeletal: No deformities and no limitation in range of motion.





- Labs


CBC & Chem 7: 


                                 05/17/22 07:00





                                 05/17/22 07:00


Labs: 


                  Abnormal Lab Results - Last 24 Hours (Table)











  05/16/22 05/16/22 05/16/22 Range/Units





  06:53 06:53 13:06 


 


RBC     (4.30-5.90)  m/uL


 


MCV     (80.0-100.0)  fL


 


MCH     (25.0-35.0)  pg


 


Monocytes # (Manual)   1.41 H   (0-1.0)  k/uL


 


Metamyelocytes # (Man)   0.13 H   (0)  k/uL


 


Myelocytes # (Manual)   0.13 H   (0)  k/uL


 


Nucleated RBCs   1 H   (0-0)  /100 WBC


 


Macrocytosis     


 


Potassium    3.4 L  (3.5-5.1)  mmol/L


 


Chloride    97 L  ()  mmol/L


 


Carbon Dioxide    38 H  (22-30)  mmol/L


 


BUN    8 L  (9-20)  mg/dL


 


Creatinine    0.57 L  (0.66-1.25)  mg/dL


 


Glucose    101 H  (74-99)  mg/dL


 


Procalcitonin  0.95 H    (0.02-0.09)  ng/mL














  05/17/22 05/17/22 Range/Units





  07:00 07:00 


 


RBC   3.57 L  (4.30-5.90)  m/uL


 


MCV   119.0 H  (80.0-100.0)  fL


 


MCH   40.2 H  (25.0-35.0)  pg


 


Monocytes # (Manual)   2.40 H  (0-1.0)  k/uL


 


Metamyelocytes # (Man)   0.18 H  (0)  k/uL


 


Myelocytes # (Manual)   0.18 H  (0)  k/uL


 


Nucleated RBCs    (0-0)  /100 WBC


 


Macrocytosis   Marked A  


 


Potassium  3.4 L   (3.5-5.1)  mmol/L


 


Chloride    ()  mmol/L


 


Carbon Dioxide    (22-30)  mmol/L


 


BUN    (9-20)  mg/dL


 


Creatinine  0.59 L   (0.66-1.25)  mg/dL


 


Glucose  100 H   (74-99)  mg/dL


 


Procalcitonin    (0.02-0.09)  ng/mL








                      Microbiology - Last 24 Hours (Table)











 05/11/22 23:17 Blood Culture - Preliminary





 Blood    No Growth after 120 hours














Assessment and Plan


Assessment: 





Impression:


Acute pneumonia secondary to Streptococcus pneumoniae


Acute influenza infection


Acute hypercapnic respiratory failure mostly related to sedation, required for 

alcohol withdrawal symptoms


Acute alcohol withdrawal and delirium tremens


Hypovolemic hyponatremia


Underlying chronic obstructive lung disease, inactive at present.


Chronic smoker


Alcohol abuse





Recommendation:


Continue antibiotics


Continue aspiration precautions


Wean and possibly discontinue Precedex


Continue ciwa protocol.


Continue updrafts


Continue to monitor in the ICU at least for the next 24-48 hours.


We'll continue to follow


Time with Patient: Less than 30

## 2022-05-17 NOTE — P.PN
Subjective


Progress Note Date: 05/17/22





patient was somnolent this morning.  He is only able to give me his name.  I 

discussed the case with the nurse who said that she had just turned off his 

Precedex.





Objective





- Vital Signs


Vital signs: 


                                   Vital Signs











Temp  97.1 F L  05/17/22 08:00


 


Pulse  115 H  05/17/22 14:00


 


Resp  14   05/17/22 14:00


 


BP  101/75   05/17/22 14:00


 


Pulse Ox  94 L  05/17/22 14:00








                                 Intake & Output











 05/16/22 05/17/22 05/17/22





 18:59 06:59 18:59


 


Intake Total 1202.627 8941.2 872.422


 


Output Total 1385 2105 248


 


Balance 47.669 -659.8 624.422


 


Weight 66.5 kg 64.5 kg 


 


Intake:   


 


  IV 1190 1160 735


 


    Dextrose 5%-0.45% NaCl 1, 640 960 235





    000 ml @ 80 mls/hr IV .   





    [id number] JESUS MANUEL Rx#:[id number]   


 


    Magnesium Sulfate-D5w Pmx   200





    1 gm In Dextrose/Water 1   





    100ml.bag @ 100 mls/hr   





    IVPB Q1H JESUS MANUEL Rx#:   





    [id number]   


 


    Potassium Chloride 10 meq 200 200 300





    In Water For Injection 1   





    100ml.bag @ 100 mls/hr   





    IVPB Q1HR JESUS MANUEL Rx#:   





    [id number]   


 


    levaquin 150  


 


    magnesium 200  


 


  Intake, IV Titration 242.669 285.2 137.422





  Amount   


 


    Clevidipine Butyrate 25 96.133 85.2 28.533





    mg In Empty Bag 1 bag @ 1   





    MG/HR 2 mls/hr IV .Q24H   





    JESUS MANUEL Rx#:[id number]   


 


    Dexmedetomidine/0.9% NaCl 146.536 200 108.889





    (Pmx) 400 mcg In Empty   





    Bag 1 bag @ 0.4 MCG/KG/HR   





    7.15 mls/hr IV .Q14H JESUS MANUEL   





    Rx#:[id number]   


 


Output:   


 


  Urine 1385 2105 248


 


Other:   


 


  Voiding Method Indwelling Catheter Indwelling Catheter Indwelling Catheter














- Exam





General examination - Alert  and Oriented 1 in NAD


Heart - + S1S2  no murmurs


Lungs - diminished breath sounds bilaterally


Abdomen soft NT ND +ve BS


Extremities - No edema


CNS - Moving all 4 extremities spontaneously


Psych - somnolent





- Labs


CBC & Chem 7: 


                                 05/17/22 07:00





                                 05/17/22 07:00


Labs: 


                  Abnormal Lab Results - Last 24 Hours (Table)











  05/16/22 05/17/22 05/17/22 Range/Units





  06:53 07:00 07:00 


 


RBC    3.57 L  (4.30-5.90)  m/uL


 


MCV    119.0 H  (80.0-100.0)  fL


 


MCH    40.2 H  (25.0-35.0)  pg


 


Monocytes # (Manual)    2.40 H  (0-1.0)  k/uL


 


Metamyelocytes # (Man)    0.18 H  (0)  k/uL


 


Myelocytes # (Manual)    0.18 H  (0)  k/uL


 


Macrocytosis    Marked A  


 


Potassium   3.4 L   (3.5-5.1)  mmol/L


 


Creatinine   0.59 L   (0.66-1.25)  mg/dL


 


Glucose   100 H   (74-99)  mg/dL


 


Procalcitonin  0.95 H    (0.02-0.09)  ng/mL








                      Microbiology - Last 24 Hours (Table)











 05/11/22 23:17 Blood Culture - Preliminary





 Blood    No Growth after 120 hours














Assessment and Plan


Assessment: 





#Acute pneumonia secondary to Streptococcus pneumonia


#Acute influenza infection


#Acute hypercapnic respiratory failure mostly related to sedation


-Continue Levaquin


-Aspiration precautions


-Patient currently on 2 L nasal cannula and is satting well





#Acute alcohol withdrawal with delirium tremens


-Patient weaned off the Precedex drip this morning


-CIWA protocol


-Patient also on when necessary Haldol





#Chronic obstructive lung disease


-stable





#Chronic smoker





The patient is admitted with an anticipated greater than 2 midnight stay for 

evaluation of sepsis, influenza


CODE STATUS: Full Code


Anticipated discharge date: 3-4 days


Anticipated discharge place: Home

## 2022-05-18 LAB
ANION GAP SERPL CALC-SCNC: 7 MMOL/L
BUN SERPL-SCNC: 14 MG/DL (ref 9–20)
CALCIUM SPEC-MCNC: 8.1 MG/DL (ref 8.4–10.2)
CHLORIDE SERPL-SCNC: 105 MMOL/L (ref 98–107)
CO2 BLDA-SCNC: 30 MMOL/L (ref 19–24)
CO2 SERPL-SCNC: 26 MMOL/L (ref 22–30)
GLUCOSE BLD-MCNC: 104 MG/DL (ref 75–99)
GLUCOSE BLD-MCNC: 156 MG/DL (ref 75–99)
GLUCOSE SERPL-MCNC: 95 MG/DL (ref 74–99)
HCO3 BLDA-SCNC: 29 MMOL/L (ref 21–25)
MAGNESIUM SPEC-SCNC: 2 MG/DL (ref 1.6–2.3)
PCO2 BLDA: 50 MMHG (ref 35–45)
PH BLDA: 7.37 [PH] (ref 7.35–7.45)
PO2 BLDA: 393 MMHG (ref 83–108)
POTASSIUM SERPL-SCNC: 4 MMOL/L (ref 3.5–5.1)
SODIUM SERPL-SCNC: 138 MMOL/L (ref 137–145)

## 2022-05-18 PROCEDURE — 4A133J1 MONITORING OF ARTERIAL PULSE, PERIPHERAL, PERCUTANEOUS APPROACH: ICD-10-PCS

## 2022-05-18 PROCEDURE — 5A1955Z RESPIRATORY VENTILATION, GREATER THAN 96 CONSECUTIVE HOURS: ICD-10-PCS

## 2022-05-18 PROCEDURE — 4A133B1 MONITORING OF ARTERIAL PRESSURE, PERIPHERAL, PERCUTANEOUS APPROACH: ICD-10-PCS

## 2022-05-18 PROCEDURE — 0BH17EZ INSERTION OF ENDOTRACHEAL AIRWAY INTO TRACHEA, VIA NATURAL OR ARTIFICIAL OPENING: ICD-10-PCS

## 2022-05-18 PROCEDURE — 03HY32Z INSERTION OF MONITORING DEVICE INTO UPPER ARTERY, PERCUTANEOUS APPROACH: ICD-10-PCS

## 2022-05-18 RX ADMIN — CLEVIPIDINE SCH: 0.5 EMULSION INTRAVENOUS at 17:21

## 2022-05-18 RX ADMIN — BENZONATATE PRN MG: 100 CAPSULE ORAL at 04:31

## 2022-05-18 RX ADMIN — LEVOFLOXACIN SCH MLS/HR: 750 INJECTION, SOLUTION INTRAVENOUS at 12:40

## 2022-05-18 RX ADMIN — CHLORHEXIDINE GLUCONATE SCH ML: 1.2 RINSE ORAL at 20:33

## 2022-05-18 RX ADMIN — PANTOPRAZOLE SODIUM SCH MG: 40 INJECTION, POWDER, FOR SOLUTION INTRAVENOUS at 09:14

## 2022-05-18 RX ADMIN — IPRATROPIUM BROMIDE AND ALBUTEROL SULFATE SCH ML: .5; 3 SOLUTION RESPIRATORY (INHALATION) at 15:42

## 2022-05-18 RX ADMIN — IPRATROPIUM BROMIDE AND ALBUTEROL SULFATE SCH ML: .5; 3 SOLUTION RESPIRATORY (INHALATION) at 19:38

## 2022-05-18 RX ADMIN — IPRATROPIUM BROMIDE AND ALBUTEROL SULFATE SCH: .5; 3 SOLUTION RESPIRATORY (INHALATION) at 15:27

## 2022-05-18 RX ADMIN — METHYLPREDNISOLONE SODIUM SUCCINATE SCH MG: 40 INJECTION, POWDER, FOR SOLUTION INTRAMUSCULAR; INTRAVENOUS at 11:50

## 2022-05-18 RX ADMIN — IPRATROPIUM BROMIDE AND ALBUTEROL SULFATE SCH ML: .5; 3 SOLUTION RESPIRATORY (INHALATION) at 10:43

## 2022-05-18 RX ADMIN — CLEVIPIDINE SCH MLS/HR: 0.5 EMULSION INTRAVENOUS at 13:29

## 2022-05-18 RX ADMIN — POTASSIUM CHLORIDE SCH MLS/HR: 14.9 INJECTION, SOLUTION INTRAVENOUS at 10:07

## 2022-05-18 RX ADMIN — BENZONATATE PRN MG: 100 CAPSULE ORAL at 09:59

## 2022-05-18 RX ADMIN — METOPROLOL TARTRATE SCH MG: 25 TABLET, FILM COATED ORAL at 10:01

## 2022-05-18 RX ADMIN — IPRATROPIUM BROMIDE AND ALBUTEROL SULFATE SCH: .5; 3 SOLUTION RESPIRATORY (INHALATION) at 07:24

## 2022-05-18 RX ADMIN — POTASSIUM CHLORIDE SCH MLS/HR: 14.9 INJECTION, SOLUTION INTRAVENOUS at 13:47

## 2022-05-18 RX ADMIN — METOPROLOL TARTRATE SCH: 25 TABLET, FILM COATED ORAL at 20:27

## 2022-05-18 RX ADMIN — HALOPERIDOL LACTATE PRN MG: 5 INJECTION, SOLUTION INTRAMUSCULAR at 12:24

## 2022-05-18 RX ADMIN — IPRATROPIUM BROMIDE AND ALBUTEROL SULFATE SCH ML: .5; 3 SOLUTION RESPIRATORY (INHALATION) at 15:47

## 2022-05-18 NOTE — XR
EXAMINATION TYPE: XR chest 1V portable

 

DATE OF EXAM: 5/18/2022

 

COMPARISON: Same-day

 

HISTORY: Tube placement

 

TECHNIQUE: Single frontal view of the chest is obtained.

 

FINDINGS:  There is placement of a nasogastric tube with tip overlying the gastric fundus. The enteri
c tube remains in place. No focal air space opacity, pleural effusion, or pneumothorax seen.  The car
diac silhouette size is within normal limits.   The osseous structures are intact.

 

IMPRESSION:  Status post support apparatus.

 

Otherwise no acute process.

## 2022-05-18 NOTE — PCN
PROCEDURE NOTE



PROCEDURE:

Right radial arterial line insertion.



PREOPERATIVE DIAGNOSIS:

Hemodynamic monitoring, hypotension.



POSTOPERATIVE DIAGNOSIS:

Hemodynamic monitoring, hypotension.



PROCEDURE DESCRIPTION:

A time-out was completed verifying correct patient, procedure, site, positioning, and

implant(s) or special equipment if applicable.



Yong's test was performed to ensure adequate perfusion.  The patient's right wrist was

prepped and draped in sterile fashion.  1% Lidocaine was used to anesthetize the area.

An 18G Arrow arterial line was introduced into the radial artery.  The catheter was

threaded over the guide wire and the needle was removed with appropriate pulsatile

blood return.  Blood loss was minimal.  The catheter was then sutured in place to the

skin and a sterile dressing applied.  Perfusion to the extremity distal to the point of

catheter insertion was checked and found to be adequate.



The patient tolerated the procedure well and there were no immediate complications.





MMODL / IJN: [id number] / Job#: [id number]

## 2022-05-18 NOTE — XR
EXAMINATION TYPE: XR chest 1V portable

 

DATE OF EXAM: 5/18/2022

 

COMPARISON: 5/15/2022

 

INDICATION: Short of breath

 

TECHNIQUE: Single frontal view of the chest is obtained. Tip of the left costophrenic angle is exclud
ed from field-of-view.

 

FINDINGS:  

The heart size is normal.  

The pulmonary vasculature is normal.  

No suspicious infiltrates are evident.  

 

 

IMPRESSION:  

1. No acute pulmonary process radiographically evident..

## 2022-05-18 NOTE — XR
EXAMINATION TYPE: XR chest 1V portable

 

DATE OF EXAM: 5/18/2022

 

CLINICAL HISTORY: Difficulty breathing had to be intubated.  

 

TECHNIQUE: Single AP portable supine view of the chest is obtained.

 

COMPARISON: Chest x-ray from earlier today and older studies

 

FINDINGS:  New Orogastric tube terminates distal esophageal level above the diaphragm and should be a
dvanced. There is no endotracheal tube terminating at superior aortic knob level approximately 3 to 4
 cm above kenyetta.

 

Chronic parenchymal changes bilaterally without suspicious new focal airspace opacity, pleural effusi
on, or pneumothorax seen. Cardiac silhouette size remains within normal limits. Old fracture deformit
y of the right clavicle is redemonstrated.

 

IMPRESSION:

1. New endotracheal tube satisfactory in position.

2. New orogastric tube needs to be advanced roughly 13 cm.

3. Chronic changes without new acute pulmonary process.

## 2022-05-18 NOTE — P.PN
Subjective


Progress Note Date: 05/18/22


Principal diagnosis: 





Acute alcohol withdrawal and influenza A infection


57-year-old male patient, current smoker, with history of COPD, EtOH and 

marijuana use, presented to the emergency department on 05/11/2022 for 

evaluation of cough, congestion, dyspnea and fever.  Patient tested positive for

influenza A, and tested negative for COVID-19 and influenza B.  He had a fever 

of 103 degrees Fahrenheit on presentation.  He was tachycardic with a rate of 

154, but in sinus mechanism.  Chest x-ray showed no active cardiopulmonary 

disease.  Patient was started on Tamiflu, IV steroids, and breathing treatments.

 However patient was starting to become agitated, and it was suspected that he 

was going through acute EtOH withdrawal, patient was given multiple doses of IV 

Ativan, to the point of unresponsiveness, and there was a concern about his 

airway protection and for that reason she was intubated and placed on mechanical

ventilator.  He was started on propofol and Versed infusion.  He is awaiting a 

bed in the ICU, he seen in the emergency department.  He is sedated and int

ubated, he is on assist-control mode of ventilation with a rate of 26, tidal 

volume 450, FiO2 100% and PEEP of 5, blood gas this morning shows pO2 of greater

than 400, pCO2 of 59, and pH of 7.19.  This was done on respiratory rate of 24 

which was subsequently increased to 26, and FiO2 was dropped down to 40%.  His 

chest x-ray today shows no active cardiopulmonary disease, ET tube 4 cm above 

the kenyetta.  Today's blood work has been reviewed, with little, is 5.5, 

hemoglobin is 12.9, serum sodium is 1:30 which is improved from 127 on 

admission, potassium is 4.2, chloride is 100, BUN is 16, creatinine 0.78.  

Troponin was 0.0-3, CRP was 5.9, proBNP was 250.  Lactic acid was 2.3, patient 

was given 1, 1/2 L in fluid boluses, currently he is on 0.0 missing a rate of 80

ML per hour, lactic acid has improved and is down to 0.7.  Owens catheter is in 

place, patient is producing adequate amount of clear and dilute urine.  This 

morning his fever is controlled, and he is 97.8, blood pressure is 98/71, he is 

in sinus mechanism with a controlled rate at 85.  He is resting comfortably, no 

evidence of grand mal seizures, he continues on thiamine replacement.





On 05/16/2022 patient seen in follow-up in the intensive care unit.  Patient was

successfully weaned and extubated from mechanical ventilator on 05/13/2022.  His

alcohol withdrawal symptoms seem to be improving, patient remains on Precedex 

infusion, which is currently down to 0.8 mics per kilo per hour.  Patient has 

required some intermittent doses of Ativan, required only 2 mg of Ativan last 

night, in addition to a single dose of morphine yesterday in the afternoon.  His

resting comfortably, he is drowsy, but he does respond to verbal stimulation, 

follows simple command.  Does not appear to be in any acute distress.  Also 

remains on Cleviprex at 4 mg per hour, and D5 half-normal saline at a rate of 80

ML per hour.  Currently on 8 L of oxygen his pulse ox is 96%.  Yesterday's chest

x-ray was showing COPD, some interstitial densities, and patchy medial right 

basilar atelectasis versus infiltrate with the possibility of potential 

pneumonia possibly related to aspiration.  Today's lab 7 reviewed showing 

electrolytes and renal profile within normal limits.  Magnesium is 1.7 it is 

being replaced per protocol.  Sputum culture showed Streptococcus pneumonia, 

blood cultures have been negative.  Patient has been started on Levaquin for 

strep pneumonia.  T-max in the last 24 hours was 99.7F, blood pressure is been 

stable.  He is in sinus mechanism with a rate of 77.  No nausea vomiting or 

diarrhea, abdomen is soft.





Reevaluated today on 5/17/2022, patient remains in the ICU, remains on 2 L nasal

cannula with O2 sat showed 94%, remains on Precedex and this is being weaned 

down, he is on Cleviprex at 2 mg per hour.  Patient is requiring intermittently 

Ativan and Haldol.  But overall the patient is doing much better.  Remains on 

Levaquin.  Patient is drowsy, but seems to be a bit more cooperative today 

compared to the last few days.  Sputum was positive for Streptococcus pneumonia,

chest x-ray showed minimal bibasilar infiltrates blood cultures have been 

negative.





Patient was reevaluated today on 5/18/2022, patient is doing fairly well, he is 

off Precedex, remains on antibiotics, remains on Tessalon Perles, patient is 

also on Ativan as needed, remains on the CIWA protocol.  Remains on updrafts, 

patient had demonstrated significant improvement in the last few days while in 

the ICU.  Hence I plan to transfer the patient out of the ICU to regular medical

floor.  And possibly consider discharge planning in the next 24-48 hours.  Basic

metabolic profile today is normal.  Patient has intermittent tachycardia and 

elevated blood pressure, hence I started the patient on metoprolol 25 mg by 

mouth twice a day











Objective





- Vital Signs


Vital signs: 


                                   Vital Signs











Temp  98.6 F   05/18/22 07:30


 


Pulse  120 H  05/18/22 11:00


 


Resp  24   05/18/22 11:32


 


BP  161/132   05/18/22 11:00


 


Pulse Ox  99   05/18/22 11:00








                                 Intake & Output











 05/17/22 05/18/22 05/18/22





 18:59 06:59 18:59


 


Intake Total 2462.422 1360 400


 


Output Total 368 540 435


 


Balance 2094.422 820 -35


 


Weight  67 kg 


 


Intake:   


 


  IV 2325 1160 400


 


    Dextrose 5%-0.45% NaCl 1, 475 960 400





    000 ml @ 80 mls/hr IV .   





    [id number] [facility] Rx#:[id number]   


 


    Levofloxacin 750Mg-D5w 150  





    Pmx 750 mg In Dextrose/   





    Water 1 150ml.bag @ 100   





    mls/hr IVPB Q24H [facility] Rx#:   





    [id number]   


 


    Magnesium Sulfate-D5w Pmx 200  





    1 gm In Dextrose/Water 1   





    100ml.bag @ 100 mls/hr   





    IVPB Q1H [facility] Rx#:   





    [id number]   


 


    Potassium Chloride 10 meq  200 





    In Water For Injection 1   





    100ml.bag @ 100 mls/hr   





    IVPB Q1H [facility] Rx#:   





    [id number]   


 


    Potassium Chloride 10 meq 500  





    In Water For Injection 1   





    100ml.bag @ 100 mls/hr   





    IVPB Q1HR [facility] Rx#:   





    [id number]   


 


    Sodium Chloride 0.9% 1, 1000  





    000 ml @ 999 mls/hr IV .   





    Q1H1M ONE Rx#:[id number]   


 


  Intake, IV Titration 137.422  





  Amount   


 


    Clevidipine Butyrate 25 28.533  





    mg In Empty Bag 1 bag @ 1   





    MG/HR 2 mls/hr IV .Q24H   





    [facility]ES[facility] MANUEL Rx#:[id number]   


 


    Dexmedetomidine/0.9% NaCl 108.889  





    (Pmx) 400 mcg In Empty   





    Bag 1 bag @ 0.4 MCG/KG/HR   





    7.15 mls/hr IV .Q14H [facility]   





    Rx#:[id number]   


 


  Oral  200 


 


Output:   


 


  Urine 368 540 435


 


Other:   


 


  Voiding Method Indwelling Catheter Indwelling Catheter Indwelling Catheter


 


  # Bowel Movements  1 1














- Exam





Physical Exam: Revealed 57-year-old white male, more arousable today, follows 

simple instructions, not in distress


HEENT:[Neck is supple.] [No neck masses.] [No thyromegaly.] [No JVD.]


Chest: [Minimal crackles at the bases.  No rhonchi and no wheezes.


Cardiac Exam: [Normal S1 and S2, no S3 gallop, no murmur.]


Abdomen: [Soft, nontender,  no megaly, no rebound, no guarding, normal bowel 

sounds.]


Extremities: [No clubbing, no edema, no cyanosis.]


Neurological Exam: Lethargic, arousable, does not follow instructions.


Psychiatric: Normal mood, flat affect, normal mental status examination, follows

simple instructions but rather slow


Musculoskeletal: No deformities and no limitation in range of motion.





- Labs


CBC & Chem 7: 


                                 05/17/22 07:00





                                 05/18/22 07:55


Labs: 


                  Abnormal Lab Results - Last 24 Hours (Table)











  05/18/22 05/18/22 Range/Units





  03:27 07:55 


 


POC Glucose (mg/dL)  104 H   (75-99)  mg/dL


 


Calcium   8.1 L  (8.4-10.2)  mg/dL








                      Microbiology - Last 24 Hours (Table)











 05/11/22 23:17 Blood Culture - Final





 Blood    No Growth after 144 hours














Assessment and Plan


Assessment: 





Impression:


Acute pneumonia secondary to Streptococcus pneumoniae


Acute influenza infection


Acute hypercapnic respiratory failure mostly related to sedation, required for 

alcohol withdrawal symptoms


Acute alcohol withdrawal and delirium tremens


Hypovolemic hyponatremia, resolved.


Underlying chronic obstructive lung disease, inactive at present.


Chronic smoker


Alcohol abuse





Recommendation:


Continue antibiotics and/Levaquin.


Continue aspiration precautions


Continue ciwa protocol.


Continue updrafts


Transfer patient out of the ICU to a regular medical floor.


We'll continue to follow


Time with Patient: Less than 30

## 2022-05-18 NOTE — P.PN
Subjective


Progress Note Date: 05/18/22





Patient is much more awake and alert today.  He is AAO 3.  Per nurse patient 

did require Ativan twice last night due to hallucinations.  Patient also and 

moderate to severe respiratory distress.  He is using his abdominal muscles to 

breathe.  I reviewed the chest x-ray myself and there are no acute findings.  

Patient's O2 requirements have been increasing.  I did tell the ICU nurse to 

notify pulmonology.





Objective





- Vital Signs


Vital signs: 


                                   Vital Signs











Temp  98.6 F   05/18/22 07:30


 


Pulse  120 H  05/18/22 10:54


 


Resp  31 H  05/18/22 10:00


 


BP  177/101   05/18/22 10:00


 


Pulse Ox  94 L  05/18/22 10:00








                                 Intake & Output











 05/17/22 05/18/22 05/18/22





 18:59 06:59 18:59


 


Intake Total 2462.422 1360 320


 


Output Total 368 540 360


 


Balance 2094.422 820 -40


 


Weight  67 kg 


 


Intake:   


 


  IV 2325 1160 320


 


    Dextrose 5%-0.45% NaCl 1, 475 960 320





    000 ml @ 80 mls/hr IV .   





    [id number] [facility]UEL Rx#:[id number]   


 


    Levofloxacin 750Mg-D5w 150  





    Pmx 750 mg In Dextrose/   





    Water 1 150ml.bag @ 100   





    mls/hr IVPB Q24H [facility]UEL Rx#:   





    [id number]   


 


    Magnesium Sulfate-D5w Pmx 200  





    1 gm In Dextrose/Water 1   





    100ml.bag @ 100 mls/hr   





    IVPB Q1H JESUS MANUEL Rx#:   





    [id number]   


 


    Potassium Chloride 10 meq  200 





    In Water For Injection 1   





    100ml.bag @ 100 mls/hr   





    IVPB Q1H JES[facility] MANUEL Rx#:   





    [id number]   


 


    Potassium Chloride 10 meq 500  





    In Water For Injection 1   





    100ml.bag @ 100 mls/hr   





    IVPB Q1HR JES[facility] MANUEL Rx#:   





    [id number]   


 


    Sodium Chloride 0.9% 1, 1000  





    000 ml @ 999 mls/hr IV .   





    Q1H1M ONE Rx#:[id number]   


 


  Intake, IV Titration 137.422  





  Amount   


 


    Clevidipine Butyrate 25 28.533  





    mg In Empty Bag 1 bag @ 1   





    MG/HR 2 mls/hr IV .Q24H   





    JES[facility] MANUEL Rx#:[id number]   


 


    Dexmedetomidine/0.9% NaCl 108.889  





    (Pmx) 400 mcg In Empty   





    Bag 1 bag @ 0.4 MCG/KG/HR   





    7.15 mls/hr IV .Q14H [facility]   





    Rx#:[id number]   


 


  Oral  200 


 


Output:   


 


  Urine 368 540 360


 


Other:   


 


  Voiding Method Indwelling Catheter Indwelling Catheter Indwelling Catheter


 


  # Bowel Movements  1 1














- Exam





General examination - Alert  and Oriented 3 in moderate to severe respiratory 

distress


Heart - + S1S2  no murmurs


Lungs -mild wheezing with rhonchi throughout


Abdomen soft NT ND +ve BS


Extremities - No edema


CNS - Moving all 4 extremities spontaneously


Psych - mildly confused





- Labs


CBC & Chem 7: 


                                 05/17/22 07:00





                                 05/18/22 07:55


Labs: 


                  Abnormal Lab Results - Last 24 Hours (Table)











  05/18/22 05/18/22 Range/Units





  03:27 07:55 


 


POC Glucose (mg/dL)  104 H   (75-99)  mg/dL


 


Calcium   8.1 L  (8.4-10.2)  mg/dL








                      Microbiology - Last 24 Hours (Table)











 05/11/22 23:17 Blood Culture - Final





 Blood    No Growth after 144 hours














Assessment and Plan


Assessment: 





#Acute pneumonia secondary to Streptococcus pneumonia


#Acute influenza infection


#Acute hypercapnic respiratory failure 


#COPD exacerbation


-Continue Levaquin


-Aspiration precautions


-Patient currently on 2 L nasal cannula and is satting well


-We'll start the patient on steroids.  Resume breathing treatments


-Patient is a low threshold for BiPAP





#Acute alcohol withdrawal with delirium tremens


-Patient weaned off the Precedex drip this morning


-CIWA protocol


-Patient also on when necessary Haldol





#Chronic smoker





The patient is admitted with an anticipated greater than 2 midnight stay for 

evaluation of sepsis, influenza


CODE STATUS: Full Code


Anticipated discharge date: 3-4 days


Anticipated discharge place: Home

## 2022-05-19 LAB
ANION GAP SERPL CALC-SCNC: 3 MMOL/L
BUN SERPL-SCNC: 11 MG/DL (ref 9–20)
CALCIUM SPEC-MCNC: 8.1 MG/DL (ref 8.4–10.2)
CELLS COUNTED: 200
CHLORIDE SERPL-SCNC: 104 MMOL/L (ref 98–107)
CO2 BLDA-SCNC: 30 MMOL/L (ref 19–24)
CO2 SERPL-SCNC: 29 MMOL/L (ref 22–30)
ERYTHROCYTE [DISTWIDTH] IN BLOOD BY AUTOMATED COUNT: 2.87 M/UL (ref 4.3–5.9)
ERYTHROCYTE [DISTWIDTH] IN BLOOD: 14.9 % (ref 11.5–15.5)
GLUCOSE SERPL-MCNC: 144 MG/DL (ref 74–99)
HCO3 BLDA-SCNC: 29 MMOL/L (ref 21–25)
HCT VFR BLD AUTO: 34.6 % (ref 39–53)
HGB BLD-MCNC: 11.3 GM/DL (ref 13–17.5)
LYMPHOCYTES # BLD MANUAL: 0.82 K/UL (ref 1–4.8)
MCH RBC QN AUTO: 39.5 PG (ref 25–35)
MCHC RBC AUTO-ENTMCNC: 32.7 G/DL (ref 31–37)
MCV RBC AUTO: 120.6 FL (ref 80–100)
METAMYELOCYTES # BLD: 0.61 K/UL
MONOCYTES # BLD MANUAL: 0.71 K/UL (ref 0–1)
MYELOCYTES # BLD MANUAL: 0.2 K/UL
NEUTROPHILS NFR BLD MANUAL: 72 %
NEUTS SEG # BLD MANUAL: 8 K/UL (ref 1.3–7.7)
PCO2 BLDA: 42 MMHG (ref 35–45)
PH BLDA: 7.44 [PH] (ref 7.35–7.45)
PLATELET # BLD AUTO: 513 K/UL (ref 150–450)
PO2 BLDA: 92 MMHG (ref 83–108)
POTASSIUM SERPL-SCNC: 4 MMOL/L (ref 3.5–5.1)
SODIUM SERPL-SCNC: 136 MMOL/L (ref 137–145)
WBC # BLD AUTO: 10.2 K/UL (ref 3.8–10.6)

## 2022-05-19 RX ADMIN — IPRATROPIUM BROMIDE AND ALBUTEROL SULFATE SCH ML: .5; 3 SOLUTION RESPIRATORY (INHALATION) at 19:09

## 2022-05-19 RX ADMIN — IPRATROPIUM BROMIDE AND ALBUTEROL SULFATE SCH ML: .5; 3 SOLUTION RESPIRATORY (INHALATION) at 07:44

## 2022-05-19 RX ADMIN — CEFAZOLIN SCH MLS/HR: 330 INJECTION, POWDER, FOR SOLUTION INTRAMUSCULAR; INTRAVENOUS at 10:54

## 2022-05-19 RX ADMIN — PANTOPRAZOLE SODIUM SCH MG: 40 INJECTION, POWDER, FOR SOLUTION INTRAVENOUS at 08:01

## 2022-05-19 RX ADMIN — IPRATROPIUM BROMIDE AND ALBUTEROL SULFATE SCH ML: .5; 3 SOLUTION RESPIRATORY (INHALATION) at 15:30

## 2022-05-19 RX ADMIN — CHLORHEXIDINE GLUCONATE SCH ML: 1.2 RINSE ORAL at 08:01

## 2022-05-19 RX ADMIN — METOPROLOL TARTRATE SCH MG: 25 TABLET, FILM COATED ORAL at 10:53

## 2022-05-19 RX ADMIN — LEVOFLOXACIN SCH MLS/HR: 750 INJECTION, SOLUTION INTRAVENOUS at 14:09

## 2022-05-19 RX ADMIN — HEPARIN SODIUM SCH UNIT: 5000 INJECTION INTRAVENOUS; SUBCUTANEOUS at 23:59

## 2022-05-19 RX ADMIN — METHYLPREDNISOLONE SODIUM SUCCINATE SCH MG: 40 INJECTION, POWDER, FOR SOLUTION INTRAMUSCULAR; INTRAVENOUS at 08:01

## 2022-05-19 RX ADMIN — CEFAZOLIN SCH MLS/HR: 330 INJECTION, POWDER, FOR SOLUTION INTRAMUSCULAR; INTRAVENOUS at 23:58

## 2022-05-19 RX ADMIN — METOPROLOL TARTRATE SCH MG: 25 TABLET, FILM COATED ORAL at 20:01

## 2022-05-19 RX ADMIN — CHLORHEXIDINE GLUCONATE SCH ML: 1.2 RINSE ORAL at 20:01

## 2022-05-19 RX ADMIN — IPRATROPIUM BROMIDE AND ALBUTEROL SULFATE SCH ML: .5; 3 SOLUTION RESPIRATORY (INHALATION) at 11:19

## 2022-05-19 RX ADMIN — POTASSIUM CHLORIDE SCH MLS/HR: 14.9 INJECTION, SOLUTION INTRAVENOUS at 02:11

## 2022-05-19 RX ADMIN — MIDAZOLAM SCH MLS/HR: 5 INJECTION INTRAMUSCULAR; INTRAVENOUS at 23:06

## 2022-05-19 RX ADMIN — CLEVIPIDINE SCH: 0.5 EMULSION INTRAVENOUS at 14:25

## 2022-05-19 RX ADMIN — HEPARIN SODIUM SCH UNIT: 5000 INJECTION INTRAVENOUS; SUBCUTANEOUS at 16:11

## 2022-05-19 NOTE — XR
EXAMINATION TYPE: XR chest 1V portable

 

DATE OF EXAM: 5/19/2022

 

Comparison: 5/18/2022

 

Clinical History: 57-year-old male Tube placement

 

Findings:

ET tube satisfactory. NG tube courses below the diaphragm. Heart normal size. Mild hyperinflation. So
me mild stringy atelectasis in the lower lungs. Otherwise, no consolidation or pleural effusion. Old 
healed fracture deformity right clavicular shaft. Old healed right-sided rib fracture deformity.

 

 

Impression:

There may be underlying COPD. Some mild strandy atelectasis in the lower lungs. Otherwise, no acute p
rocess.

## 2022-05-19 NOTE — P.PN
Subjective


Progress Note Date: 05/19/22





Patient was intubated and sedated yesterday.  He was in DTs.  Patient currently 

maxed out on propranolol.  He has been requiring when necessary Ativan as well.





Objective





- Vital Signs


Vital signs: 


                                   Vital Signs











Temp  99 F   05/19/22 12:00


 


Pulse  85   05/19/22 12:00


 


Resp  26 H  05/19/22 12:00


 


BP  130/85   05/19/22 11:00


 


Pulse Ox  94 L  05/19/22 12:00








                                 Intake & Output











 05/18/22 05/19/22 05/19/22





 18:59 06:59 18:59


 


Intake Total 5982.871 3149.635 861.156


 


Output Total 1860 685 460


 


Balance -663.125 686.635 401.156


 


Weight  67 kg 69 kg


 


Intake:   


 


  IV 1110 880 480


 


    Dextrose 5%-0.45% NaCl 1, 960 880 320





    000 ml @ 80 mls/hr IV .   





    [id number] [facility] Rx#:[id number]   


 


    Levofloxacin 750Mg-D5w 150  





    Pmx 750 mg In Dextrose/   





    Water 1 150ml.bag @ 100   





    mls/hr IVPB Q24H [facility]UEL Rx#:   





    [id number]   


 


    Sodium Chloride 0.9% 1,   160





    000 ml @ 80 mls/hr IV .   





    [id number] [facility]UEL Rx#:[id number]   


 


  Intake, IV Titration 86.875 281.635 192.156





  Amount   


 


    Clevidipine Butyrate 25 10.4  





    mg In Empty Bag 1 bag @ 2   





    MG/HR 4 mls/hr IV .   





    [id number] [facility]UEL Rx#:[id number]   


 


    Dexmedetomidine/0.9% NaCl 31.351  





    (Pmx) 400 mcg In Empty   





    Bag 1 bag @ 0.2 MCG/KG/HR   





    3.35 mls/hr IV .Q24H [facility]   





    Rx#:[id number]   


 


    propofoL 1,000 mg In 45.124 281.635 192.156





    Empty Bag 1 bag @ 5 MCG/   





    KG/MIN 2.01 mls/hr IV .   





    Q24H JES[facility] MANUEL Rx#:[id number]   


 


  Tube Feeding  180 129


 


  Other  30 60


 


Output:   


 


  Urine 1860 685 460


 


Other:   


 


  Voiding Method Indwelling Catheter Indwelling Catheter Indwelling Catheter


 


  # Bowel Movements 1  








                       ABP, PAP, CO, CI - Last Documented











Arterial Blood Pressure        93/43

















- Exam





General examination -intubated and sedated


Heart - + S1S2  no murmurs


Lungs -mild wheezing with rhonchi throughout


Abdomen soft NT ND +ve BS


Extremities - No edema


CNS - unable to assess


Psych - unable to assess





- Labs


CBC & Chem 7: 


                                 05/19/22 04:38





                                 05/19/22 04:38


Labs: 


                  Abnormal Lab Results - Last 24 Hours (Table)











  05/18/22 05/18/22 05/19/22 Range/Units





  16:48 20:41 04:38 


 


RBC    2.87 L  (4.30-5.90)  m/uL


 


Hgb    11.3 L D  (13.0-17.5)  gm/dL


 


Hct    34.6 L  (39.0-53.0)  %


 


MCV    120.6 H  (80.0-100.0)  fL


 


MCH    39.5 H  (25.0-35.0)  pg


 


Plt Count    513 H  (150-450)  k/uL


 


Neutrophils # (Manual)    8.00 H  (1.3-7.7)  k/uL


 


Lymphocytes # (Manual)    0.82 L  (1.0-4.8)  k/uL


 


Metamyelocytes # (Man)    0.61 H  (0)  k/uL


 


Myelocytes # (Manual)    0.20 H  (0)  k/uL


 


Macrocytosis    Marked A  


 


ABG pCO2  50 H    (35-45)  mmHg


 


ABG pO2  393 H    ()  mmHg


 


ABG HCO3  29 H    (21-25)  mmol/L


 


ABG Total CO2  30 H    (19-24)  mmol/L


 


ABG O2 Saturation  99.8 H    (94-97)  %


 


Sodium     (137-145)  mmol/L


 


Creatinine     (0.66-1.25)  mg/dL


 


Glucose     (74-99)  mg/dL


 


POC Glucose (mg/dL)   156 H   (75-99)  mg/dL


 


Calcium     (8.4-10.2)  mg/dL














  05/19/22 05/19/22 Range/Units





  04:38 05:45 


 


RBC    (4.30-5.90)  m/uL


 


Hgb    (13.0-17.5)  gm/dL


 


Hct    (39.0-53.0)  %


 


MCV    (80.0-100.0)  fL


 


MCH    (25.0-35.0)  pg


 


Plt Count    (150-450)  k/uL


 


Neutrophils # (Manual)    (1.3-7.7)  k/uL


 


Lymphocytes # (Manual)    (1.0-4.8)  k/uL


 


Metamyelocytes # (Man)    (0)  k/uL


 


Myelocytes # (Manual)    (0)  k/uL


 


Macrocytosis    


 


ABG pCO2    (35-45)  mmHg


 


ABG pO2    ()  mmHg


 


ABG HCO3   29 H  (21-25)  mmol/L


 


ABG Total CO2   30 H  (19-24)  mmol/L


 


ABG O2 Saturation   97.1 H  (94-97)  %


 


Sodium  136 L   (137-145)  mmol/L


 


Creatinine  0.65 L   (0.66-1.25)  mg/dL


 


Glucose  144 H   (74-99)  mg/dL


 


POC Glucose (mg/dL)    (75-99)  mg/dL


 


Calcium  8.1 L   (8.4-10.2)  mg/dL














Assessment and Plan


Assessment: 





#Acute pneumonia secondary to Streptococcus pneumonia


#Acute influenza infection


#Acute hypercapnic respiratory failure 


#COPD exacerbation


#Acute respiratory distress secondary to DTs


-Continue Levaquin


-Aspiration precautions


-Patient intubated on 05/18/2022 for DTs


-Resume steroids.  Resume breathing treatments





#Acute alcohol withdrawal with delirium tremens


-Patient currently intubated and sedated





#Chronic smoker





The patient is admitted with an anticipated greater than 2 midnight stay for 

evaluation of sepsis, influenza


CODE STATUS: Full Code


Anticipated discharge date: Depending on clinical course


Anticipated discharge place: Home

## 2022-05-19 NOTE — P.PN
Subjective


Progress Note Date: 05/19/22


Principal diagnosis: 





Acute alcohol withdrawal and influenza A infection


57-year-old male patient, current smoker, with history of COPD, EtOH and 

marijuana use, presented to the emergency department on 05/11/2022 for 

evaluation of cough, congestion, dyspnea and fever.  Patient tested positive for

influenza A, and tested negative for COVID-19 and influenza B.  He had a fever 

of 103 degrees Fahrenheit on presentation.  He was tachycardic with a rate of 

154, but in sinus mechanism.  Chest x-ray showed no active cardiopulmonary 

disease.  Patient was started on Tamiflu, IV steroids, and breathing treatments.

 However patient was starting to become agitated, and it was suspected that he 

was going through acute EtOH withdrawal, patient was given multiple doses of IV 

Ativan, to the point of unresponsiveness, and there was a concern about his 

airway protection and for that reason she was intubated and placed on mechanical

ventilator.  He was started on propofol and Versed infusion.  He is awaiting a 

bed in the ICU, he seen in the emergency department.  He is sedated and int

ubated, he is on assist-control mode of ventilation with a rate of 26, tidal 

volume 450, FiO2 100% and PEEP of 5, blood gas this morning shows pO2 of greater

than 400, pCO2 of 59, and pH of 7.19.  This was done on respiratory rate of 24 

which was subsequently increased to 26, and FiO2 was dropped down to 40%.  His 

chest x-ray today shows no active cardiopulmonary disease, ET tube 4 cm above 

the kenyetta.  Today's blood work has been reviewed, with little, is 5.5, 

hemoglobin is 12.9, serum sodium is 1:30 which is improved from 127 on 

admission, potassium is 4.2, chloride is 100, BUN is 16, creatinine 0.78.  

Troponin was 0.0-3, CRP was 5.9, proBNP was 250.  Lactic acid was 2.3, patient 

was given 1, 1/2 L in fluid boluses, currently he is on 0.0 missing a rate of 80

ML per hour, lactic acid has improved and is down to 0.7.  Owens catheter is in 

place, patient is producing adequate amount of clear and dilute urine.  This 

morning his fever is controlled, and he is 97.8, blood pressure is 98/71, he is 

in sinus mechanism with a controlled rate at 85.  He is resting comfortably, no 

evidence of grand mal seizures, he continues on thiamine replacement.





On 05/16/2022 patient seen in follow-up in the intensive care unit.  Patient was

successfully weaned and extubated from mechanical ventilator on 05/13/2022.  His

alcohol withdrawal symptoms seem to be improving, patient remains on Precedex 

infusion, which is currently down to 0.8 mics per kilo per hour.  Patient has 

required some intermittent doses of Ativan, required only 2 mg of Ativan last 

night, in addition to a single dose of morphine yesterday in the afternoon.  His

resting comfortably, he is drowsy, but he does respond to verbal stimulation, 

follows simple command.  Does not appear to be in any acute distress.  Also 

remains on Cleviprex at 4 mg per hour, and D5 half-normal saline at a rate of 80

ML per hour.  Currently on 8 L of oxygen his pulse ox is 96%.  Yesterday's chest

x-ray was showing COPD, some interstitial densities, and patchy medial right 

basilar atelectasis versus infiltrate with the possibility of potential 

pneumonia possibly related to aspiration.  Today's lab 7 reviewed showing 

electrolytes and renal profile within normal limits.  Magnesium is 1.7 it is 

being replaced per protocol.  Sputum culture showed Streptococcus pneumonia, 

blood cultures have been negative.  Patient has been started on Levaquin for 

strep pneumonia.  T-max in the last 24 hours was 99.7F, blood pressure is been 

stable.  He is in sinus mechanism with a rate of 77.  No nausea vomiting or 

diarrhea, abdomen is soft.





Reevaluated today on 5/17/2022, patient remains in the ICU, remains on 2 L nasal

cannula with O2 sat showed 94%, remains on Precedex and this is being weaned 

down, he is on Cleviprex at 2 mg per hour.  Patient is requiring intermittently 

Ativan and Haldol.  But overall the patient is doing much better.  Remains on 

Levaquin.  Patient is drowsy, but seems to be a bit more cooperative today 

compared to the last few days.  Sputum was positive for Streptococcus pneumonia,

chest x-ray showed minimal bibasilar infiltrates blood cultures have been 

negative.





Patient was reevaluated today on 5/18/2022, patient is doing fairly well, he is 

off Precedex, remains on antibiotics, remains on Tessalon Perles, patient is 

also on Ativan as needed, remains on the CIWA protocol.  Remains on updrafts, 

patient had demonstrated significant improvement in the last few days while in 

the ICU.  Hence I plan to transfer the patient out of the ICU to regular medical

floor.  And possibly consider discharge planning in the next 24-48 hours.  Basic

metabolic profile today is normal.  Patient has intermittent tachycardia and 

elevated blood pressure, hence I started the patient on metoprolol 25 mg by 

mouth twice a day





Patient was reevaluated today on 5/19/2022, patient developed worsening symptoms

of alcohol withdrawal yesterday, did not improve with multiple medications 

including Precedex, Haldol, Ativan, and the patient became more and more 

agitated, hence I recommended intubating the patient and he was placed on m

echanical ventilation in the afternoon.  Remains on mechanical ventilation his 

tidal volume is 450 rate is 26 FiO2 is 40% and PEEP of 5.  ABG showed a pO2 of 

92 pCO2 42 pH of 7.44.  Patient is on propofol at 75 mcg/kg/m IV fluid at 80 

mL/h.  He is on GI and DVT prophylaxis.  And is also on enteral feeding as per 

dietitian's recommendations WBC count is 10.2.  Hemoglobin 11.3 electrolytes are

normal renal profile is normal, chest x-ray showed COPD and minimal atelectasis 

in the lower lungs underlying pneumonia is not entirely ruled out











Objective





- Vital Signs


Vital signs: 


                                   Vital Signs











Temp  99 F   05/19/22 12:00


 


Pulse  85   05/19/22 12:00


 


Resp  26 H  05/19/22 12:00


 


BP  130/85   05/19/22 11:00


 


Pulse Ox  94 L  05/19/22 12:00








                                 Intake & Output











 05/18/22 05/19/22 05/19/22





 18:59 06:59 18:59


 


Intake Total 7602.280 1459.635 861.156


 


Output Total 1860 685 460


 


Balance -663.125 686.635 401.156


 


Weight  67 kg 69 kg


 


Intake:   


 


  IV 1110 880 480


 


    Dextrose 5%-0.45% NaCl 1, 960 880 320





    000 ml @ 80 mls/hr IV .   





    [id number] JESUS MANUEL Rx#:[id number]   


 


    Levofloxacin 750Mg-D5w 150  





    Pmx 750 mg In Dextrose/   





    Water 1 150ml.bag @ 100   





    mls/hr IVPB Q24H JESUS MANUEL Rx#:   





    [id number]   


 


    Sodium Chloride 0.9% 1,   160





    000 ml @ 80 mls/hr IV .   





    [id number] JESUS MANUEL Rx#:[id number]   


 


  Intake, IV Titration 86.875 281.635 192.156





  Amount   


 


    Clevidipine Butyrate 25 10.4  





    mg In Empty Bag 1 bag @ 2   





    MG/HR 4 mls/hr IV .   





    [id number] JESUS MANUEL Rx#:[id number]   


 


    Dexmedetomidine/0.9% NaCl 31.351  





    (Pmx) 400 mcg In Empty   





    Bag 1 bag @ 0.2 MCG/KG/HR   





    3.35 mls/hr IV .Q24H JESUS MANUEL   





    Rx#:[id number]   


 


    propofoL 1,000 mg In 45.124 281.635 192.156





    Empty Bag 1 bag @ 5 MCG/   





    KG/MIN 2.01 mls/hr IV .   





    Q24H JESUS MANUEL Rx#:[id number]   


 


  Tube Feeding  180 129


 


  Other  30 60


 


Output:   


 


  Urine 1860 685 460


 


Other:   


 


  Voiding Method Indwelling Catheter Indwelling Catheter Indwelling Catheter


 


  # Bowel Movements 1  








                       ABP, PAP, CO, CI - Last Documented











Arterial Blood Pressure        93/43

















- Labs


CBC & Chem 7: 


                                 05/19/22 04:38





                                 05/19/22 04:38


Labs: 


                  Abnormal Lab Results - Last 24 Hours (Table)











  05/18/22 05/18/22 05/19/22 Range/Units





  16:48 20:41 04:38 


 


RBC    2.87 L  (4.30-5.90)  m/uL


 


Hgb    11.3 L D  (13.0-17.5)  gm/dL


 


Hct    34.6 L  (39.0-53.0)  %


 


MCV    120.6 H  (80.0-100.0)  fL


 


MCH    39.5 H  (25.0-35.0)  pg


 


Plt Count    513 H  (150-450)  k/uL


 


Neutrophils # (Manual)    8.00 H  (1.3-7.7)  k/uL


 


Lymphocytes # (Manual)    0.82 L  (1.0-4.8)  k/uL


 


Metamyelocytes # (Man)    0.61 H  (0)  k/uL


 


Myelocytes # (Manual)    0.20 H  (0)  k/uL


 


Macrocytosis    Marked A  


 


ABG pCO2  50 H    (35-45)  mmHg


 


ABG pO2  393 H    ()  mmHg


 


ABG HCO3  29 H    (21-25)  mmol/L


 


ABG Total CO2  30 H    (19-24)  mmol/L


 


ABG O2 Saturation  99.8 H    (94-97)  %


 


Sodium     (137-145)  mmol/L


 


Creatinine     (0.66-1.25)  mg/dL


 


Glucose     (74-99)  mg/dL


 


POC Glucose (mg/dL)   156 H   (75-99)  mg/dL


 


Calcium     (8.4-10.2)  mg/dL














  05/19/22 05/19/22 Range/Units





  04:38 05:45 


 


RBC    (4.30-5.90)  m/uL


 


Hgb    (13.0-17.5)  gm/dL


 


Hct    (39.0-53.0)  %


 


MCV    (80.0-100.0)  fL


 


MCH    (25.0-35.0)  pg


 


Plt Count    (150-450)  k/uL


 


Neutrophils # (Manual)    (1.3-7.7)  k/uL


 


Lymphocytes # (Manual)    (1.0-4.8)  k/uL


 


Metamyelocytes # (Man)    (0)  k/uL


 


Myelocytes # (Manual)    (0)  k/uL


 


Macrocytosis    


 


ABG pCO2    (35-45)  mmHg


 


ABG pO2    ()  mmHg


 


ABG HCO3   29 H  (21-25)  mmol/L


 


ABG Total CO2   30 H  (19-24)  mmol/L


 


ABG O2 Saturation   97.1 H  (94-97)  %


 


Sodium  136 L   (137-145)  mmol/L


 


Creatinine  0.65 L   (0.66-1.25)  mg/dL


 


Glucose  144 H   (74-99)  mg/dL


 


POC Glucose (mg/dL)    (75-99)  mg/dL


 


Calcium  8.1 L   (8.4-10.2)  mg/dL














Assessment and Plan


Assessment: 





Impression:


Acute hypoxic respiratory failure, multifactorial, but this time the patient was

reintubated mostly because of his worsening on call withdrawal symptoms and 

delirium tremens.


Acute pneumonia secondary to Streptococcus pneumoniae


Acute influenza infection


Acute hypercapnic respiratory failure mostly related to sedation, required for 

alcohol withdrawal symptoms


Acute alcohol withdrawal and delirium tremens


Hypovolemic hyponatremia, resolved.


Underlying chronic obstructive lung disease, inactive at present.


Chronic smoker


Alcohol abuse





Recommendation:


Continue ventilatory support


Continue nutritional support/enteral feeding


Continue GI and DVT prophylaxis


Continue antibiotics and/Levaquin.


Continue aspiration precautions


Continue propofol and titrate accordingly


Continue [facility]


Patient will remain in the ICU


Prognosis is guarded


Critical care time is over 30 minutes


Time with Patient: Greater than 30

## 2022-05-20 LAB
ANION GAP SERPL CALC-SCNC: 2 MMOL/L
BUN SERPL-SCNC: 10 MG/DL (ref 9–20)
CALCIUM SPEC-MCNC: 8.5 MG/DL (ref 8.4–10.2)
CHLORIDE SERPL-SCNC: 111 MMOL/L (ref 98–107)
CO2 BLDA-SCNC: 30 MMOL/L (ref 19–24)
CO2 SERPL-SCNC: 28 MMOL/L (ref 22–30)
ERYTHROCYTE [DISTWIDTH] IN BLOOD BY AUTOMATED COUNT: 2.59 M/UL (ref 4.3–5.9)
ERYTHROCYTE [DISTWIDTH] IN BLOOD: 16.1 % (ref 11.5–15.5)
GLUCOSE BLD-MCNC: 112 MG/DL (ref 75–99)
GLUCOSE BLD-MCNC: 130 MG/DL (ref 75–99)
GLUCOSE BLD-MCNC: 92 MG/DL (ref 75–99)
GLUCOSE SERPL-MCNC: 105 MG/DL (ref 74–99)
HCO3 BLDA-SCNC: 29 MMOL/L (ref 21–25)
HCT VFR BLD AUTO: 31.8 % (ref 39–53)
HGB BLD-MCNC: 10.6 GM/DL (ref 13–17.5)
MAGNESIUM SPEC-SCNC: 2 MG/DL (ref 1.6–2.3)
MCH RBC QN AUTO: 41 PG (ref 25–35)
MCHC RBC AUTO-ENTMCNC: 33.4 G/DL (ref 31–37)
MCV RBC AUTO: 122.7 FL (ref 80–100)
PCO2 BLDA: 40 MMHG (ref 35–45)
PH BLDA: 7.46 [PH] (ref 7.35–7.45)
PLATELET # BLD AUTO: 582 K/UL (ref 150–450)
PO2 BLDA: 68 MMHG (ref 83–108)
POTASSIUM SERPL-SCNC: 3.5 MMOL/L (ref 3.5–5.1)
SODIUM SERPL-SCNC: 141 MMOL/L (ref 137–145)
WBC # BLD AUTO: 8.2 K/UL (ref 3.8–10.6)

## 2022-05-20 PROCEDURE — 02HV33Z INSERTION OF INFUSION DEVICE INTO SUPERIOR VENA CAVA, PERCUTANEOUS APPROACH: ICD-10-PCS

## 2022-05-20 RX ADMIN — LEVOFLOXACIN SCH MLS/HR: 750 INJECTION, SOLUTION INTRAVENOUS at 12:18

## 2022-05-20 RX ADMIN — METHYLPREDNISOLONE SODIUM SUCCINATE SCH MG: 40 INJECTION, POWDER, FOR SOLUTION INTRAMUSCULAR; INTRAVENOUS at 08:15

## 2022-05-20 RX ADMIN — CLEVIPIDINE SCH: 0.5 EMULSION INTRAVENOUS at 03:10

## 2022-05-20 RX ADMIN — HEPARIN SODIUM SCH UNIT: 5000 INJECTION INTRAVENOUS; SUBCUTANEOUS at 18:04

## 2022-05-20 RX ADMIN — CHLORHEXIDINE GLUCONATE SCH ML: 1.2 RINSE ORAL at 20:30

## 2022-05-20 RX ADMIN — IPRATROPIUM BROMIDE AND ALBUTEROL SULFATE SCH ML: .5; 3 SOLUTION RESPIRATORY (INHALATION) at 20:44

## 2022-05-20 RX ADMIN — CEFAZOLIN SCH MLS/HR: 330 INJECTION, POWDER, FOR SOLUTION INTRAMUSCULAR; INTRAVENOUS at 08:15

## 2022-05-20 RX ADMIN — IPRATROPIUM BROMIDE AND ALBUTEROL SULFATE SCH ML: .5; 3 SOLUTION RESPIRATORY (INHALATION) at 16:47

## 2022-05-20 RX ADMIN — PANTOPRAZOLE SODIUM SCH MG: 40 INJECTION, POWDER, FOR SOLUTION INTRAVENOUS at 08:15

## 2022-05-20 RX ADMIN — CEFAZOLIN SCH MLS/HR: 330 INJECTION, POWDER, FOR SOLUTION INTRAMUSCULAR; INTRAVENOUS at 23:01

## 2022-05-20 RX ADMIN — HEPARIN SODIUM SCH UNIT: 5000 INJECTION INTRAVENOUS; SUBCUTANEOUS at 08:15

## 2022-05-20 RX ADMIN — MIDAZOLAM SCH MLS/HR: 5 INJECTION INTRAMUSCULAR; INTRAVENOUS at 04:04

## 2022-05-20 RX ADMIN — CHLORHEXIDINE GLUCONATE SCH ML: 1.2 RINSE ORAL at 08:15

## 2022-05-20 RX ADMIN — CLEVIPIDINE SCH MLS/HR: 0.5 EMULSION INTRAVENOUS at 05:05

## 2022-05-20 RX ADMIN — POTASSIUM BICARBONATE SCH MEQ: 782 TABLET, EFFERVESCENT ORAL at 05:39

## 2022-05-20 RX ADMIN — POTASSIUM BICARBONATE SCH MEQ: 782 TABLET, EFFERVESCENT ORAL at 06:31

## 2022-05-20 RX ADMIN — IPRATROPIUM BROMIDE AND ALBUTEROL SULFATE SCH ML: .5; 3 SOLUTION RESPIRATORY (INHALATION) at 11:44

## 2022-05-20 RX ADMIN — IPRATROPIUM BROMIDE AND ALBUTEROL SULFATE SCH ML: .5; 3 SOLUTION RESPIRATORY (INHALATION) at 08:41

## 2022-05-20 NOTE — OP
OPERATIVE REPORT



OPERATIVE REPORT:

Placement of right internal jugular triple-lumen catheter.



PREOPERATIVE DIAGNOSIS:

Acute hypoxic respiratory failure secondary to alcohol withdrawal and delirium tremens.



POSTOPERATIVE DIAGNOSIS:

Acute hypoxic respiratory failure secondary to alcohol withdrawal and delirium tremens.



ANESTHESIA USED:

Two mL of 1% lidocaine.



PROCEDURE DESCRIPTION:

The patient was placed in a supine position. The right cervical region was prepared in

a sterile fashion and drapes were applied.  Using the posterior approach, the area was

locally anesthetized, then the right IJ was easily cannulated. A guidewire was placed.

Area around the guidewire was dilated and then a triple-lumen catheter was inserted

over the guidewire. The guidewire was removed.  There was good blood flow in the three

different ports of the triple-lumen catheter.  Line was secured with 3.0 silk sutures.

Chest x-ray postoperatively showed adequate placement and no complications.





MMBENJA / RYANNEN: [id number] / Job#: [id number]

## 2022-05-20 NOTE — P.PN
Subjective


Progress Note Date: 05/20/22





Patient remains intubated and sedated.  He is currently on propofol for 

sedation.  He was started on Versed last night but became bradycardic.  We'll 

send was discontinued.  Bradycardia has resolved.





Objective





- Vital Signs


Vital signs: 


                                   Vital Signs











Temp  97.7 F   05/20/22 12:00


 


Pulse  79   05/20/22 12:00


 


Resp  26 H  05/20/22 12:00


 


BP  128/80   05/20/22 11:00


 


Pulse Ox  98   05/20/22 12:00








                                 Intake & Output











 05/19/22 05/20/22 05/20/22





 18:59 06:59 18:59


 


Intake Total 6887.737 8721.402 661.308


 


Output Total 1270 990 150


 


Balance 588.148 666.402 511.308


 


Weight 69 kg 69.8 kg 69.8 kg


 


Intake:   


 


  IV 1110 880 480


 


    Dextrose 5%-0.45% NaCl 1, 320  





    000 ml @ 80 mls/hr IV .   





    [id number] JESUS MANUEL Rx#:[id number]   


 


    Levofloxacin 750Mg-D5w 150  





    Pmx 750 mg In Dextrose/   





    Water 1 150ml.bag @ 100   





    mls/hr IVPB Q24H JESUS MANUEL Rx#:   





    [id number]   


 


    Sodium Chloride 0.9% 1, 640 880 480





    000 ml @ 80 mls/hr IV .   





    [id number] JESUS MANUEL Rx#:[id number]   


 


  Intake, IV Titration 391.148 410.402 135.308





  Amount   


 


    Clevidipine Butyrate 25  2.668 0.133





    mg In Empty Bag 1 bag @ 2   





    MG/HR 4 mls/hr IV .   





    [id number] JESUS MANUEL Rx#:[id number]   


 


    Midazolam HCl 50 mg In  29.049 





    Sodium Chloride 0.9% 40   





    ml @ 1 MG/HR 1 mls/hr IV   





    .Q24H JESUS MANUEL Rx#:[id number]   


 


    propofoL 1,000 mg In 391.148 378.685 135.175





    Empty Bag 1 bag @ 5 MCG/   





    KG/MIN 2.01 mls/hr IV .   





    Q24H JESUS MANUEL Rx#:[id number]   


 


  Tube Feeding 267 276 46


 


  Other 90 90 


 


Output:   


 


  Urine 1270 990 150


 


Other:   


 


  Voiding Method Indwelling Catheter Indwelling Catheter Indwelling Catheter








                       ABP, PAP, CO, CI - Last Documented











Arterial Blood Pressure        161/85

















- Exam





General examination -intubated and sedated


Heart - + S1S2  no murmurs


Lungs -mild wheezing with rhonchi throughout


Abdomen soft NT ND +ve BS


Extremities - No edema


CNS - unable to assess


Psych - unable to assess





- Labs


CBC & Chem 7: 


                                 05/20/22 08:10





                                 05/20/22 08:10


Labs: 


                  Abnormal Lab Results - Last 24 Hours (Table)











  05/20/22 05/20/22 05/20/22 Range/Units





  04:53 05:23 08:10 


 


RBC    2.59 L  (4.30-5.90)  m/uL


 


Hgb    10.6 L  (13.0-17.5)  gm/dL


 


Hct    31.8 L  (39.0-53.0)  %


 


MCV    122.7 H  (80.0-100.0)  fL


 


MCH    41.0 H  (25.0-35.0)  pg


 


RDW    16.1 H  (11.5-15.5)  %


 


Plt Count    582 H  (150-450)  k/uL


 


Macrocytosis    Marked A  


 


ABG pH   7.46 H   (7.35-7.45)  


 


ABG pO2   68 L   ()  mmHg


 


ABG HCO3   29 H   (21-25)  mmol/L


 


ABG Total CO2   30 H   (19-24)  mmol/L


 


ABG O2 Saturation   93.9 L   (94-97)  %


 


Chloride  111 H    ()  mmol/L


 


Creatinine  0.62 L    (0.66-1.25)  mg/dL


 


Glucose  105 H    (74-99)  mg/dL


 


POC Glucose (mg/dL)     (75-99)  mg/dL














  05/20/22 Range/Units





  11:54 


 


RBC   (4.30-5.90)  m/uL


 


Hgb   (13.0-17.5)  gm/dL


 


Hct   (39.0-53.0)  %


 


MCV   (80.0-100.0)  fL


 


MCH   (25.0-35.0)  pg


 


RDW   (11.5-15.5)  %


 


Plt Count   (150-450)  k/uL


 


Macrocytosis   


 


ABG pH   (7.35-7.45)  


 


ABG pO2   ()  mmHg


 


ABG HCO3   (21-25)  mmol/L


 


ABG Total CO2   (19-24)  mmol/L


 


ABG O2 Saturation   (94-97)  %


 


Chloride   ()  mmol/L


 


Creatinine   (0.66-1.25)  mg/dL


 


Glucose   (74-99)  mg/dL


 


POC Glucose (mg/dL)  130 H  (75-99)  mg/dL








                      Microbiology - Last 24 Hours (Table)











 05/19/22 23:02 Sputum Culture - Preliminary





 Sputum 














Assessment and Plan


Assessment: 





#Acute pneumonia secondary to Streptococcus pneumonia


#Acute influenza infection


#Acute hypercapnic respiratory failure 


#COPD exacerbation


#Acute respiratory distress secondary to DTs


-Continue Levaquin


-Aspiration precautions


-Patient intubated on 05/18/2022 for DTs


-Resume steroids.  Resume breathing treatments


-Wean sedation as tolerated


-ICU management





#Acute alcohol withdrawal with delirium tremens


-Patient currently intubated and sedated





#Chronic smoker





The patient is admitted with an anticipated greater than 2 midnight stay for 

evaluation of sepsis, influenza


CODE STATUS: Full Code


Anticipated discharge date: Depending on clinical course


Anticipated discharge place: Home

## 2022-05-20 NOTE — XR
EXAMINATION TYPE: XR chest 1V portable

 

DATE OF EXAM: 5/20/2022

 

COMPARISON: 5/19/2022

 

HISTORY: SOB, Follow Up

 

FINDINGS:

 

Indwelling tubes and catheters are unchanged.

 

No infiltrates are seen. No evidence for atelectasis or effusion.

 

Stable appearance of the cardio-mediastinal structures at this time.

 

IMPRESSION:

1.  Stable portable chest.  Clinical correlation and follow up until resolution is recommended.

## 2022-05-20 NOTE — XR
EXAMINATION TYPE: XR chest 1V portable

 

DATE OF EXAM: 5/20/2022

 

COMPARISON: NONE

 

HISTORY: SOB, Follow Up

 

FINDINGS:

 

Right IJ central venous line with its distal tip overlying the SVC. No evidence for pneumothorax. End
otracheal tube and NG tube are in place.

 

The lungs are clear without definite infiltrate. There may be atelectasis right medial lung base.

 

Stable appearance of the cardio-mediastinal structures at this time.

 

 

 

IMPRESSION:

1. As above

## 2022-05-20 NOTE — P.PN
Subjective


Progress Note Date: 05/20/22


Principal diagnosis: 





Acute alcohol withdrawal and influenza A infection


57-year-old male patient, current smoker, with history of COPD, EtOH and 

marijuana use, presented to the emergency department on 05/11/2022 for 

evaluation of cough, congestion, dyspnea and fever.  Patient tested positive for

influenza A, and tested negative for COVID-19 and influenza B.  He had a fever 

of 103 degrees Fahrenheit on presentation.  He was tachycardic with a rate of 

154, but in sinus mechanism.  Chest x-ray showed no active cardiopulmonary 

disease.  Patient was started on Tamiflu, IV steroids, and breathing treatments.

 However patient was starting to become agitated, and it was suspected that he 

was going through acute EtOH withdrawal, patient was given multiple doses of IV 

Ativan, to the point of unresponsiveness, and there was a concern about his 

airway protection and for that reason she was intubated and placed on mechanical

ventilator.  He was started on propofol and Versed infusion.  He is awaiting a 

bed in the ICU, he seen in the emergency department.  He is sedated and int

ubated, he is on assist-control mode of ventilation with a rate of 26, tidal 

volume 450, FiO2 100% and PEEP of 5, blood gas this morning shows pO2 of greater

than 400, pCO2 of 59, and pH of 7.19.  This was done on respiratory rate of 24 

which was subsequently increased to 26, and FiO2 was dropped down to 40%.  His 

chest x-ray today shows no active cardiopulmonary disease, ET tube 4 cm above 

the kenyetta.  Today's blood work has been reviewed, with little, is 5.5, 

hemoglobin is 12.9, serum sodium is 1:30 which is improved from 127 on 

admission, potassium is 4.2, chloride is 100, BUN is 16, creatinine 0.78.  

Troponin was 0.0-3, CRP was 5.9, proBNP was 250.  Lactic acid was 2.3, patient 

was given 1, 1/2 L in fluid boluses, currently he is on 0.0 missing a rate of 80

ML per hour, lactic acid has improved and is down to 0.7.  Owens catheter is in 

place, patient is producing adequate amount of clear and dilute urine.  This 

morning his fever is controlled, and he is 97.8, blood pressure is 98/71, he is 

in sinus mechanism with a controlled rate at 85.  He is resting comfortably, no 

evidence of grand mal seizures, he continues on thiamine replacement.





On 05/16/2022 patient seen in follow-up in the intensive care unit.  Patient was

successfully weaned and extubated from mechanical ventilator on 05/13/2022.  His

alcohol withdrawal symptoms seem to be improving, patient remains on Precedex 

infusion, which is currently down to 0.8 mics per kilo per hour.  Patient has 

required some intermittent doses of Ativan, required only 2 mg of Ativan last 

night, in addition to a single dose of morphine yesterday in the afternoon.  His

resting comfortably, he is drowsy, but he does respond to verbal stimulation, 

follows simple command.  Does not appear to be in any acute distress.  Also 

remains on Cleviprex at 4 mg per hour, and D5 half-normal saline at a rate of 80

ML per hour.  Currently on 8 L of oxygen his pulse ox is 96%.  Yesterday's chest

x-ray was showing COPD, some interstitial densities, and patchy medial right 

basilar atelectasis versus infiltrate with the possibility of potential 

pneumonia possibly related to aspiration.  Today's lab 7 reviewed showing 

electrolytes and renal profile within normal limits.  Magnesium is 1.7 it is 

being replaced per protocol.  Sputum culture showed Streptococcus pneumonia, 

blood cultures have been negative.  Patient has been started on Levaquin for 

strep pneumonia.  T-max in the last 24 hours was 99.7F, blood pressure is been 

stable.  He is in sinus mechanism with a rate of 77.  No nausea vomiting or 

diarrhea, abdomen is soft.





Reevaluated today on 5/17/2022, patient remains in the ICU, remains on 2 L nasal

cannula with O2 sat showed 94%, remains on Precedex and this is being weaned 

down, he is on Cleviprex at 2 mg per hour.  Patient is requiring intermittently 

Ativan and Haldol.  But overall the patient is doing much better.  Remains on 

Levaquin.  Patient is drowsy, but seems to be a bit more cooperative today 

compared to the last few days.  Sputum was positive for Streptococcus pneumonia,

chest x-ray showed minimal bibasilar infiltrates blood cultures have been 

negative.





Patient was reevaluated today on 5/18/2022, patient is doing fairly well, he is 

off Precedex, remains on antibiotics, remains on Tessalon Perles, patient is 

also on Ativan as needed, remains on the CIWA protocol.  Remains on updrafts, 

patient had demonstrated significant improvement in the last few days while in 

the ICU.  Hence I plan to transfer the patient out of the ICU to regular medical

floor.  And possibly consider discharge planning in the next 24-48 hours.  Basic

metabolic profile today is normal.  Patient has intermittent tachycardia and 

elevated blood pressure, hence I started the patient on metoprolol 25 mg by 

mouth twice a day





Patient was reevaluated today on 5/19/2022, patient developed worsening symptoms

of alcohol withdrawal yesterday, did not improve with multiple medications 

including Precedex, Haldol, Ativan, and the patient became more and more 

agitated, hence I recommended intubating the patient and he was placed on m

echanical ventilation in the afternoon.  Remains on mechanical ventilation his 

tidal volume is 450 rate is 26 FiO2 is 40% and PEEP of 5.  ABG showed a pO2 of 

92 pCO2 42 pH of 7.44.  Patient is on propofol at 75 mcg/kg/m IV fluid at 80 

mL/h.  He is on GI and DVT prophylaxis.  And is also on enteral feeding as per 

dietitian's recommendations WBC count is 10.2.  Hemoglobin 11.3 electrolytes are

normal renal profile is normal, chest x-ray showed COPD and minimal atelectasis 

in the lower lungs underlying pneumonia is not entirely ruled out





Reevaluated today on 5/20/22, patient remains in the ICU, patient remains 

intubated and mechanically ventilated.  He is on assist control rate of 26 tidal

volume 450 FiO2 50% PEEP of 5.  ABG showed a pO2 of 68 pCO2 of 40 pH of 7.46.  

Patient is on propofol at 75 mcg/kg/m, he is on IV fluid at 80 mL/h 0.9 normal 

saline, he is on vital a PE at 23 mL per hour.  Hemodynamically the patient is 

stable, he was on Cleviprex yesterday which has been discontinued, his 

metoprolol has been discontinued because of bradycardia overnight.  Patient is 

in sinus rhythm, not in any distress, sedated, and his chest x-ray today showed 

no evidence of infiltrate, may be a right medial lung atelectasis.  Right IJ 

central line was placed in his patient today, and follow-up chest x-ray was 

basically unremarkable.  WBC count is 8.2 hemoglobin is 10.6.  Basic metabolic 

profile is normal renal profile is normal











Objective





- Vital Signs


Vital signs: 


                                   Vital Signs











Temp  97.7 F   05/20/22 12:00


 


Pulse  79   05/20/22 14:00


 


Resp  26 H  05/20/22 14:00


 


BP  120/77   05/20/22 14:00


 


Pulse Ox  100   05/20/22 14:00








                                 Intake & Output











 05/19/22 05/20/22 05/20/22





 18:59 06:59 18:59


 


Intake Total 5129.773 7554.402 1008.308


 


Output Total 1270 990 350


 


Balance 588.148 666.402 658.308


 


Weight 69 kg 69.8 kg 69.8 kg


 


Intake:   


 


  IV 1110 880 640


 


    Dextrose 5%-0.45% NaCl 1, 320  





    000 ml @ 80 mls/hr IV .   





    [id number] JESUS MANUEL Rx#:[id number]   


 


    Levofloxacin 750Mg-D5w 150  





    Pmx 750 mg In Dextrose/   





    Water 1 150ml.bag @ 100   





    mls/hr IVPB Q24H JESUS MANUEL Rx#:   





    [id number]   


 


    Sodium Chloride 0.9% 1, 640 880 640





    000 ml @ 80 mls/hr IV .   





    [id number] JESUS MANUEL Rx#:[id number]   


 


  Intake, IV Titration 391.148 410.402 285.308





  Amount   


 


    Clevidipine Butyrate 25  2.668 0.133





    mg In Empty Bag 1 bag @ 2   





    MG/HR 4 mls/hr IV .   





    [id number] JESUS MANUEL Rx#:[id number]   


 


    Levofloxacin 750Mg-D5w   150





    Pmx 750 mg In Dextrose/   





    Water 1 150ml.bag @ 100   





    mls/hr IVPB Q24H JESUS MANUEL Rx#:   





    [id number]   


 


    Midazolam HCl 50 mg In  29.049 





    Sodium Chloride 0.9% 40   





    ml @ 1 MG/HR 1 mls/hr IV   





    .Q24H JESUS MANUEL Rx#:[id number]   


 


    propofoL 1,000 mg In 391.148 378.685 135.175





    Empty Bag 1 bag @ 5 MCG/   





    KG/MIN 2.01 mls/hr IV .   





    Q24H JESUS MANUEL Rx#:[id number]   


 


  Tube Feeding 267 276 83


 


  Other 90 90 


 


Output:   


 


  Urine 1270 990 350


 


Other:   


 


  Voiding Method Indwelling Catheter Indwelling Catheter Indwelling Catheter








                       ABP, PAP, CO, CI - Last Documented











Arterial Blood Pressure        148/76

















- Exam





Physical Exam: Revealed 57-year-old white male, sedated, not in distress.


HEENT:[Neck is supple.] [No neck masses.] [No thyromegaly.] [No JVD.]


Chest: [Clear breath sounds bilaterally no crackles or rhonchi or wheezes


Cardiac Exam: [Normal S1 and S2, no S3 gallop, no murmur.]


Abdomen: [Soft, nontender,  no megaly, no rebound, no guarding, normal bowel 

sounds.]


Extremities: [No clubbing, no edema, no cyanosis.]


Neurological Exam: Could not assess, patient is sedated.


Psychiatric: Could not assess, patient is sedated, on propofol


Musculoskeletal: No deformities and no limitation in range of motion.





- Labs


CBC & Chem 7: 


                                 05/20/22 08:10





                                 05/20/22 08:10


Labs: 


                  Abnormal Lab Results - Last 24 Hours (Table)











  05/20/22 05/20/22 05/20/22 Range/Units





  04:53 05:23 08:10 


 


RBC    2.59 L  (4.30-5.90)  m/uL


 


Hgb    10.6 L  (13.0-17.5)  gm/dL


 


Hct    31.8 L  (39.0-53.0)  %


 


MCV    122.7 H  (80.0-100.0)  fL


 


MCH    41.0 H  (25.0-35.0)  pg


 


RDW    16.1 H  (11.5-15.5)  %


 


Plt Count    582 H  (150-450)  k/uL


 


Macrocytosis    Marked A  


 


ABG pH   7.46 H   (7.35-7.45)  


 


ABG pO2   68 L   ()  mmHg


 


ABG HCO3   29 H   (21-25)  mmol/L


 


ABG Total CO2   30 H   (19-24)  mmol/L


 


ABG O2 Saturation   93.9 L   (94-97)  %


 


Chloride  111 H    ()  mmol/L


 


Creatinine  0.62 L    (0.66-1.25)  mg/dL


 


Glucose  105 H    (74-99)  mg/dL


 


POC Glucose (mg/dL)     (75-99)  mg/dL














  05/20/22 Range/Units





  11:54 


 


RBC   (4.30-5.90)  m/uL


 


Hgb   (13.0-17.5)  gm/dL


 


Hct   (39.0-53.0)  %


 


MCV   (80.0-100.0)  fL


 


MCH   (25.0-35.0)  pg


 


RDW   (11.5-15.5)  %


 


Plt Count   (150-450)  k/uL


 


Macrocytosis   


 


ABG pH   (7.35-7.45)  


 


ABG pO2   ()  mmHg


 


ABG HCO3   (21-25)  mmol/L


 


ABG Total CO2   (19-24)  mmol/L


 


ABG O2 Saturation   (94-97)  %


 


Chloride   ()  mmol/L


 


Creatinine   (0.66-1.25)  mg/dL


 


Glucose   (74-99)  mg/dL


 


POC Glucose (mg/dL)  130 H  (75-99)  mg/dL








                      Microbiology - Last 24 Hours (Table)











 05/19/22 23:02 Sputum Culture - Preliminary





 Sputum 














Assessment and Plan


Assessment: 





Impression:


Acute hypoxic respiratory failure, multifactorial, but this time the patient was

reintubated mostly because of his worsening on call withdrawal symptoms and 

delirium tremens.


Acute pneumonia secondary to Streptococcus pneumoniae, chest x-ray has cleared.


Acute influenza infection


Acute hypercapnic respiratory failure mostly related to sedation, required for 

alcohol withdrawal symptoms


Acute alcohol withdrawal and delirium tremens


Hypovolemic hyponatremia, resolved.


Underlying chronic obstructive lung disease, inactive at present.


Chronic smoker


Alcohol abuse





Recommendation:


Continue ventilatory support


Continue nutritional support/enteral feeding


Continue GI and DVT prophylaxis


Continue antibiotics and/Levaquin.


Continue aspiration precautions


Continue propofol and titrate accordingly


Continue updrafts


Not ready for weaning, we will assess mental status off sedation in the next 24 

hours


Patient will remain in the ICU


Prognosis is guarded


Critical care time is over 30 minutes, not including the time spent on 

procedures


Time with Patient: Greater than 30

## 2022-05-21 LAB
ALBUMIN SERPL-MCNC: 3.3 G/DL (ref 3.5–5)
ALP SERPL-CCNC: 281 U/L (ref 38–126)
ALT SERPL-CCNC: 33 U/L (ref 4–49)
ANION GAP SERPL CALC-SCNC: 4 MMOL/L
AST SERPL-CCNC: 36 U/L (ref 17–59)
BASOPHILS # BLD AUTO: 0.1 K/UL (ref 0–0.2)
BASOPHILS NFR BLD AUTO: 1 %
BUN SERPL-SCNC: 12 MG/DL (ref 9–20)
CALCIUM SPEC-MCNC: 8.6 MG/DL (ref 8.4–10.2)
CHLORIDE SERPL-SCNC: 107 MMOL/L (ref 98–107)
CO2 BLDA-SCNC: 31 MMOL/L (ref 19–24)
CO2 SERPL-SCNC: 29 MMOL/L (ref 22–30)
EOSINOPHIL # BLD AUTO: 0 K/UL (ref 0–0.7)
EOSINOPHIL NFR BLD AUTO: 0 %
ERYTHROCYTE [DISTWIDTH] IN BLOOD BY AUTOMATED COUNT: 2.87 M/UL (ref 4.3–5.9)
ERYTHROCYTE [DISTWIDTH] IN BLOOD: 15.2 % (ref 11.5–15.5)
GLUCOSE BLD-MCNC: 110 MG/DL (ref 75–99)
GLUCOSE BLD-MCNC: 127 MG/DL (ref 75–99)
GLUCOSE SERPL-MCNC: 109 MG/DL (ref 74–99)
HCO3 BLDA-SCNC: 30 MMOL/L (ref 21–25)
HCT VFR BLD AUTO: 34.6 % (ref 39–53)
HGB BLD-MCNC: 11.2 GM/DL (ref 13–17.5)
LYMPHOCYTES # SPEC AUTO: 1.7 K/UL (ref 1–4.8)
LYMPHOCYTES NFR SPEC AUTO: 12 %
MCH RBC QN AUTO: 38.9 PG (ref 25–35)
MCHC RBC AUTO-ENTMCNC: 32.3 G/DL (ref 31–37)
MCV RBC AUTO: 120.6 FL (ref 80–100)
MONOCYTES # BLD AUTO: 0.9 K/UL (ref 0–1)
MONOCYTES NFR BLD AUTO: 6 %
NEUTROPHILS # BLD AUTO: 11.7 K/UL (ref 1.3–7.7)
NEUTROPHILS NFR BLD AUTO: 80 %
PCO2 BLDA: 39 MMHG (ref 35–45)
PH BLDA: 7.49 [PH] (ref 7.35–7.45)
PLATELET # BLD AUTO: 702 K/UL (ref 150–450)
PO2 BLDA: 63 MMHG (ref 83–108)
POTASSIUM SERPL-SCNC: 3.7 MMOL/L (ref 3.5–5.1)
PROT SERPL-MCNC: 6.2 G/DL (ref 6.3–8.2)
SODIUM SERPL-SCNC: 140 MMOL/L (ref 137–145)
WBC # BLD AUTO: 14.6 K/UL (ref 3.8–10.6)

## 2022-05-21 RX ADMIN — METHYLPREDNISOLONE SODIUM SUCCINATE SCH MG: 40 INJECTION, POWDER, FOR SOLUTION INTRAMUSCULAR; INTRAVENOUS at 09:13

## 2022-05-21 RX ADMIN — CHLORHEXIDINE GLUCONATE SCH ML: 1.2 RINSE ORAL at 09:12

## 2022-05-21 RX ADMIN — CLEVIPIDINE SCH MLS/HR: 0.5 EMULSION INTRAVENOUS at 22:16

## 2022-05-21 RX ADMIN — CLEVIPIDINE SCH MLS/HR: 0.5 EMULSION INTRAVENOUS at 05:03

## 2022-05-21 RX ADMIN — LEVOFLOXACIN SCH MLS/HR: 750 INJECTION, SOLUTION INTRAVENOUS at 13:23

## 2022-05-21 RX ADMIN — IPRATROPIUM BROMIDE AND ALBUTEROL SULFATE SCH ML: .5; 3 SOLUTION RESPIRATORY (INHALATION) at 07:19

## 2022-05-21 RX ADMIN — HEPARIN SODIUM SCH UNIT: 5000 INJECTION INTRAVENOUS; SUBCUTANEOUS at 09:18

## 2022-05-21 RX ADMIN — HEPARIN SODIUM SCH UNIT: 5000 INJECTION INTRAVENOUS; SUBCUTANEOUS at 00:33

## 2022-05-21 RX ADMIN — CHLORHEXIDINE GLUCONATE SCH ML: 1.2 RINSE ORAL at 20:16

## 2022-05-21 RX ADMIN — CLEVIPIDINE SCH MLS/HR: 0.5 EMULSION INTRAVENOUS at 08:30

## 2022-05-21 RX ADMIN — IPRATROPIUM BROMIDE AND ALBUTEROL SULFATE SCH ML: .5; 3 SOLUTION RESPIRATORY (INHALATION) at 15:32

## 2022-05-21 RX ADMIN — IPRATROPIUM BROMIDE AND ALBUTEROL SULFATE SCH ML: .5; 3 SOLUTION RESPIRATORY (INHALATION) at 21:06

## 2022-05-21 RX ADMIN — HEPARIN SODIUM SCH UNIT: 5000 INJECTION INTRAVENOUS; SUBCUTANEOUS at 15:48

## 2022-05-21 RX ADMIN — IPRATROPIUM BROMIDE AND ALBUTEROL SULFATE SCH ML: .5; 3 SOLUTION RESPIRATORY (INHALATION) at 11:23

## 2022-05-21 RX ADMIN — CEFAZOLIN SCH MLS/HR: 330 INJECTION, POWDER, FOR SOLUTION INTRAMUSCULAR; INTRAVENOUS at 13:27

## 2022-05-21 NOTE — P.PN
Subjective


Progress Note Date: 05/21/22


Principal diagnosis: 





Acute alcohol withdrawal and influenza A infection


57-year-old male patient, current smoker, with history of COPD, EtOH and 

marijuana use, presented to the emergency department on 05/11/2022 for 

evaluation of cough, congestion, dyspnea and fever.  Patient tested positive for

influenza A, and tested negative for COVID-19 and influenza B.  He had a fever 

of 103 degrees Fahrenheit on presentation.  He was tachycardic with a rate of 

154, but in sinus mechanism.  Chest x-ray showed no active cardiopulmonary 

disease.  Patient was started on Tamiflu, IV steroids, and breathing treatments.

 However patient was starting to become agitated, and it was suspected that he 

was going through acute EtOH withdrawal, patient was given multiple doses of IV 

Ativan, to the point of unresponsiveness, and there was a concern about his 

airway protection and for that reason she was intubated and placed on mechanical

ventilator.  He was started on propofol and Versed infusion.  He is awaiting a 

bed in the ICU, he seen in the emergency department.  He is sedated and int

ubated, he is on assist-control mode of ventilation with a rate of 26, tidal 

volume 450, FiO2 100% and PEEP of 5, blood gas this morning shows pO2 of greater

than 400, pCO2 of 59, and pH of 7.19.  This was done on respiratory rate of 24 

which was subsequently increased to 26, and FiO2 was dropped down to 40%.  His 

chest x-ray today shows no active cardiopulmonary disease, ET tube 4 cm above 

the kenyetta.  Today's blood work has been reviewed, with little, is 5.5, 

hemoglobin is 12.9, serum sodium is 1:30 which is improved from 127 on 

admission, potassium is 4.2, chloride is 100, BUN is 16, creatinine 0.78.  

Troponin was 0.0-3, CRP was 5.9, proBNP was 250.  Lactic acid was 2.3, patient 

was given 1, 1/2 L in fluid boluses, currently he is on 0.0 missing a rate of 80

ML per hour, lactic acid has improved and is down to 0.7.  Owens catheter is in 

place, patient is producing adequate amount of clear and dilute urine.  This 

morning his fever is controlled, and he is 97.8, blood pressure is 98/71, he is 

in sinus mechanism with a controlled rate at 85.  He is resting comfortably, no 

evidence of grand mal seizures, he continues on thiamine replacement.





On 05/16/2022 patient seen in follow-up in the intensive care unit.  Patient was

successfully weaned and extubated from mechanical ventilator on 05/13/2022.  His

alcohol withdrawal symptoms seem to be improving, patient remains on Precedex 

infusion, which is currently down to 0.8 mics per kilo per hour.  Patient has 

required some intermittent doses of Ativan, required only 2 mg of Ativan last 

night, in addition to a single dose of morphine yesterday in the afternoon.  His

resting comfortably, he is drowsy, but he does respond to verbal stimulation, 

follows simple command.  Does not appear to be in any acute distress.  Also 

remains on Cleviprex at 4 mg per hour, and D5 half-normal saline at a rate of 80

ML per hour.  Currently on 8 L of oxygen his pulse ox is 96%.  Yesterday's chest

x-ray was showing COPD, some interstitial densities, and patchy medial right 

basilar atelectasis versus infiltrate with the possibility of potential 

pneumonia possibly related to aspiration.  Today's lab 7 reviewed showing 

electrolytes and renal profile within normal limits.  Magnesium is 1.7 it is 

being replaced per protocol.  Sputum culture showed Streptococcus pneumonia, 

blood cultures have been negative.  Patient has been started on Levaquin for 

strep pneumonia.  T-max in the last 24 hours was 99.7F, blood pressure is been 

stable.  He is in sinus mechanism with a rate of 77.  No nausea vomiting or 

diarrhea, abdomen is soft.





Reevaluated today on 5/17/2022, patient remains in the ICU, remains on 2 L nasal

cannula with O2 sat showed 94%, remains on Precedex and this is being weaned 

down, he is on Cleviprex at 2 mg per hour.  Patient is requiring intermittently 

Ativan and Haldol.  But overall the patient is doing much better.  Remains on 

Levaquin.  Patient is drowsy, but seems to be a bit more cooperative today 

compared to the last few days.  Sputum was positive for Streptococcus pneumonia,

chest x-ray showed minimal bibasilar infiltrates blood cultures have been 

negative.





Patient was reevaluated today on 5/18/2022, patient is doing fairly well, he is 

off Precedex, remains on antibiotics, remains on Tessalon Perles, patient is 

also on Ativan as needed, remains on the CIWA protocol.  Remains on updrafts, 

patient had demonstrated significant improvement in the last few days while in 

the ICU.  Hence I plan to transfer the patient out of the ICU to regular medical

floor.  And possibly consider discharge planning in the next 24-48 hours.  Basic

metabolic profile today is normal.  Patient has intermittent tachycardia and 

elevated blood pressure, hence I started the patient on metoprolol 25 mg by 

mouth twice a day





Patient was reevaluated today on 5/19/2022, patient developed worsening symptoms

of alcohol withdrawal yesterday, did not improve with multiple medications 

including Precedex, Haldol, Ativan, and the patient became more and more 

agitated, hence I recommended intubating the patient and he was placed on m

echanical ventilation in the afternoon.  Remains on mechanical ventilation his 

tidal volume is 450 rate is 26 FiO2 is 40% and PEEP of 5.  ABG showed a pO2 of 

92 pCO2 42 pH of 7.44.  Patient is on propofol at 75 mcg/kg/m IV fluid at 80 

mL/h.  He is on GI and DVT prophylaxis.  And is also on enteral feeding as per 

dietitian's recommendations WBC count is 10.2.  Hemoglobin 11.3 electrolytes are

normal renal profile is normal, chest x-ray showed COPD and minimal atelectasis 

in the lower lungs underlying pneumonia is not entirely ruled out





Reevaluated today on 5/20/22, patient remains in the ICU, patient remains 

intubated and mechanically ventilated.  He is on assist control rate of 26 tidal

volume 450 FiO2 50% PEEP of 5.  ABG showed a pO2 of 68 pCO2 of 40 pH of 7.46.  

Patient is on propofol at 75 mcg/kg/m, he is on IV fluid at 80 mL/h 0.9 normal 

saline, he is on vital a PE at 23 mL per hour.  Hemodynamically the patient is 

stable, he was on Cleviprex yesterday which has been discontinued, his 

metoprolol has been discontinued because of bradycardia overnight.  Patient is 

in sinus rhythm, not in any distress, sedated, and his chest x-ray today showed 

no evidence of infiltrate, may be a right medial lung atelectasis.  Right IJ 

central line was placed in his patient today, and follow-up chest x-ray was 

basically unremarkable.  WBC count is 8.2 hemoglobin is 10.6.  Basic metabolic 

profile is normal renal profile is normal





Reevaluated today on 5/21/22, patient remains in the ICU, intubated and 

mechanically ventilated.  Remains on assist control rate of 26, tidal volume is 

450, FiO2 40%, PEEP of 5.  ABG showed a pO2 of 63 pCO2 of 39 pH of 7.49, hence 

his FiO2 was decreased from 40% to 45%.  Patient remains on propofol at 35 but 

per kilo per minute, he is on Cleviprex, he is on IV fluid at 80 mL per hour, 

vital AF a 57 mL per hour, he is on Levaquin for streptococcal pneumonia.chest 

x-ray is showing no acute process, apparently his streptococcal pneumonia has 

resolved.in spite of propofol, the patient continues to have intermittent 

episodes of extreme agitations, at times is requiring even Ativan in addition to

his propofol.  Considering the report given to me about last night agitation in 

spite of propofol, I'm keeping the patient intubated and mechanically ventilated

sedated, and I have no plans to wean or extubated anytime soon.WBC count today 

is 14.6 and global was 11.2.basic metabolic profile is normal renal profile is 

normal.











Objective





- Vital Signs


Vital signs: 


                                   Vital Signs











Temp  100.7 F H  05/21/22 07:50


 


Pulse  110 H  05/21/22 10:00


 


Resp  26 H  05/21/22 10:00


 


BP  138/86   05/21/22 07:00


 


Pulse Ox  97   05/21/22 10:00








                                 Intake & Output











 05/20/22 05/21/22 05/21/22





 18:59 06:59 18:59


 


Intake Total 6146.062 3145.6 664.800


 


Output Total 635 1345 1110


 


Balance 865.668 525.6 -445.200


 


Weight 69.8 kg 70.5 kg 


 


Intake:   


 


   960 326


 


    Pressure Bag (0.9 Sodium   6





    Chloride)   


 


    Sodium Chloride 0.9% 1, 960 960 320





    000 ml @ 80 mls/hr IV .   





    [id number] JESUS MANUEL Rx#:[id number]   


 


  Intake, IV Titration 457.668 339.6 160.800





  Amount   


 


    Clevidipine Butyrate 25 0.133 39.6 





    mg In Empty Bag 1 bag @ 2   





    MG/HR 4 mls/hr IV .   





    [id number] JESUS MANUEL Rx#:[id number]   


 


    Levofloxacin 750Mg-D5w 150  





    Pmx 750 mg In Dextrose/   





    Water 1 150ml.bag @ 100   





    mls/hr IVPB Q24H JESUS MANUEL Rx#:   





    [id number]   


 


    propofoL 1,000 mg In 307.535 300 160.800





    Empty Bag 1 bag @ 5 MCG/   





    KG/MIN 2.01 mls/hr IV .   





    Q24H JESUS MANUEL Rx#:[id number]   


 


  Tube Feeding 83 481 148


 


  Other  90 30


 


Output:   


 


  Urine 635 1345 1110


 


Other:   


 


  Voiding Method Indwelling Catheter Indwelling Catheter Indwelling Catheter








                       ABP, PAP, CO, CI - Last Documented











Arterial Blood Pressure        148/69

















- Exam





Physical Exam: Revealed 57-year-old white male, sedated, calm


HEENT:[Neck is supple.] [No neck masses.] [No thyromegaly.] [No JVD.]


Chest: [Clear breath sounds bilaterally no crackles or rhonchi or wheezes


Cardiac Exam: [Normal S1 and S2, no S3 gallop, no murmur.]


Abdomen: [Soft, nontender,  no megaly, no rebound, no guarding, normal bowel 

sounds.]


Extremities: [No clubbing, no edema, no cyanosis.]


Neurological Exam: Could not assess, patient is sedated.


Psychiatric: Could not assess, patient is sedated, on propofol


Musculoskeletal: No deformities and no limitation in range of motion.





- Labs


CBC & Chem 7: 


                                 05/21/22 05:36





                                 05/21/22 05:36


Labs: 


                  Abnormal Lab Results - Last 24 Hours (Table)











  05/20/22 05/20/22 05/21/22 Range/Units





  11:54 18:40 05:34 


 


WBC     (3.8-10.6)  k/uL


 


RBC     (4.30-5.90)  m/uL


 


Hgb     (13.0-17.5)  gm/dL


 


Hct     (39.0-53.0)  %


 


MCV     (80.0-100.0)  fL


 


MCH     (25.0-35.0)  pg


 


Plt Count     (150-450)  k/uL


 


Neutrophils #     (1.3-7.7)  k/uL


 


Macrocytosis     


 


ABG pH     (7.35-7.45)  


 


ABG pO2     ()  mmHg


 


ABG HCO3     (21-25)  mmol/L


 


ABG Total CO2     (19-24)  mmol/L


 


ABG O2 Saturation     (94-97)  %


 


Glucose     (74-99)  mg/dL


 


POC Glucose (mg/dL)  130 H  112 H  110 H  (75-99)  mg/dL


 


Alkaline Phosphatase     ()  U/L


 


Total Protein     (6.3-8.2)  g/dL


 


Albumin     (3.5-5.0)  g/dL














  05/21/22 05/21/22 05/21/22 Range/Units





  05:36 05:36 06:11 


 


WBC  14.6 H    (3.8-10.6)  k/uL


 


RBC  2.87 L    (4.30-5.90)  m/uL


 


Hgb  11.2 L    (13.0-17.5)  gm/dL


 


Hct  34.6 L    (39.0-53.0)  %


 


MCV  120.6 H    (80.0-100.0)  fL


 


MCH  38.9 H    (25.0-35.0)  pg


 


Plt Count  702 H    (150-450)  k/uL


 


Neutrophils #  11.7 H    (1.3-7.7)  k/uL


 


Macrocytosis  Marked A    


 


ABG pH    7.49 H  (7.35-7.45)  


 


ABG pO2    63 L  ()  mmHg


 


ABG HCO3    30 H  (21-25)  mmol/L


 


ABG Total CO2    31 H  (19-24)  mmol/L


 


ABG O2 Saturation    92.3 L  (94-97)  %


 


Glucose   109 H   (74-99)  mg/dL


 


POC Glucose (mg/dL)     (75-99)  mg/dL


 


Alkaline Phosphatase   281 H   ()  U/L


 


Total Protein   6.2 L   (6.3-8.2)  g/dL


 


Albumin   3.3 L   (3.5-5.0)  g/dL








                      Microbiology - Last 24 Hours (Table)











 05/19/22 23:02 Gram Stain - Preliminary





 Sputum Sputum Culture - Preliminary














Assessment and Plan


Assessment: 





Impression:


Acute hypoxic respiratory failure, multifactorial, but this time the patient was

reintubated mostly because of his worsening alcohol withdrawal symptoms and 

delirium tremens.


Acute pneumonia secondary to Streptococcus pneumoniae, resolved based on chest 

x-ray findings


Acute influenza infection


Acute hypercapnic respiratory failure mostly related to sedation, required for 

alcohol withdrawal symptoms


Acute alcohol withdrawal and delirium tremens


Hypovolemic hyponatremia, resolved.


Underlying chronic obstructive lung disease, inactive at present.


Chronic smoker


Alcohol abuse





Recommendation:


Continue ventilatory support


Continue nutritional support/enteral feeding


Continue GI and DVT prophylaxis


Continue antibiotics 


Continue aspiration precautions


Continue propofol and titrate accordingly


Continue updrafts


again not quite ready for weaning trial


Patient will remain in the ICU


Prognosis is guarded


Critical care time is over 30 minutes


Time with Patient: Greater than 30

## 2022-05-21 NOTE — XR
EXAMINATION TYPE: XR chest 1V portable

 

DATE OF EXAM: 5/21/2022

 

CLINICAL HISTORY: Difficulty breathing progress study.  

 

TECHNIQUE: Single AP portable semiupright view of the chest is obtained.

 

COMPARISON: Chest x-ray from one day earlier and older studies.

 

FINDINGS:  Stable endotracheal and orogastric tubes. Stable right internal jugular central venous cat
heter.

 

 Current study is suboptimal as entire right lung base not included. Visualized lungs are grossly miriam
ar. Cardiac silhouette size remains within normal limits. Old malunited fracture right mid clavicle r
edemonstrated.

 

IMPRESSION: No acute pulmonary process. No significant change from one day earlier.

## 2022-05-22 LAB
ALBUMIN SERPL-MCNC: 3.3 G/DL (ref 3.5–5)
ALP SERPL-CCNC: 273 U/L (ref 38–126)
ALT SERPL-CCNC: 33 U/L (ref 4–49)
ANION GAP SERPL CALC-SCNC: 4 MMOL/L
AST SERPL-CCNC: 31 U/L (ref 17–59)
BASOPHILS # BLD AUTO: 0.1 K/UL (ref 0–0.2)
BASOPHILS NFR BLD AUTO: 1 %
BUN SERPL-SCNC: 13 MG/DL (ref 9–20)
CALCIUM SPEC-MCNC: 8.8 MG/DL (ref 8.4–10.2)
CHLORIDE SERPL-SCNC: 105 MMOL/L (ref 98–107)
CO2 BLDA-SCNC: 32 MMOL/L (ref 19–24)
CO2 SERPL-SCNC: 31 MMOL/L (ref 22–30)
EOSINOPHIL # BLD AUTO: 0 K/UL (ref 0–0.7)
EOSINOPHIL NFR BLD AUTO: 0 %
ERYTHROCYTE [DISTWIDTH] IN BLOOD BY AUTOMATED COUNT: 2.93 M/UL (ref 4.3–5.9)
ERYTHROCYTE [DISTWIDTH] IN BLOOD: 15.3 % (ref 11.5–15.5)
GLUCOSE BLD-MCNC: 100 MG/DL (ref 75–99)
GLUCOSE BLD-MCNC: 111 MG/DL (ref 75–99)
GLUCOSE BLD-MCNC: 112 MG/DL (ref 75–99)
GLUCOSE BLD-MCNC: 126 MG/DL (ref 75–99)
GLUCOSE SERPL-MCNC: 108 MG/DL (ref 74–99)
HCO3 BLDA-SCNC: 31 MMOL/L (ref 21–25)
HCT VFR BLD AUTO: 35.2 % (ref 39–53)
HGB BLD-MCNC: 11.5 GM/DL (ref 13–17.5)
LYMPHOCYTES # SPEC AUTO: 1.6 K/UL (ref 1–4.8)
LYMPHOCYTES NFR SPEC AUTO: 12 %
MCH RBC QN AUTO: 39.4 PG (ref 25–35)
MCHC RBC AUTO-ENTMCNC: 32.8 G/DL (ref 31–37)
MCV RBC AUTO: 120.3 FL (ref 80–100)
MONOCYTES # BLD AUTO: 0.8 K/UL (ref 0–1)
MONOCYTES NFR BLD AUTO: 6 %
NEUTROPHILS # BLD AUTO: 10.5 K/UL (ref 1.3–7.7)
NEUTROPHILS NFR BLD AUTO: 80 %
PCO2 BLDA: 40 MMHG (ref 35–45)
PH BLDA: 7.49 [PH] (ref 7.35–7.45)
PLATELET # BLD AUTO: 706 K/UL (ref 150–450)
PO2 BLDA: 68 MMHG (ref 83–108)
POTASSIUM SERPL-SCNC: 3.7 MMOL/L (ref 3.5–5.1)
PROT SERPL-MCNC: 6.3 G/DL (ref 6.3–8.2)
SODIUM SERPL-SCNC: 140 MMOL/L (ref 137–145)
WBC # BLD AUTO: 13.2 K/UL (ref 3.8–10.6)

## 2022-05-22 RX ADMIN — CEFAZOLIN SCH MLS/HR: 330 INJECTION, POWDER, FOR SOLUTION INTRAMUSCULAR; INTRAVENOUS at 15:33

## 2022-05-22 RX ADMIN — CLEVIPIDINE SCH MLS/HR: 0.5 EMULSION INTRAVENOUS at 22:10

## 2022-05-22 RX ADMIN — CHLORHEXIDINE GLUCONATE SCH ML: 1.2 RINSE ORAL at 09:09

## 2022-05-22 RX ADMIN — LEVOFLOXACIN SCH: 750 INJECTION, SOLUTION INTRAVENOUS at 13:23

## 2022-05-22 RX ADMIN — IPRATROPIUM BROMIDE AND ALBUTEROL SULFATE SCH ML: .5; 3 SOLUTION RESPIRATORY (INHALATION) at 19:05

## 2022-05-22 RX ADMIN — IPRATROPIUM BROMIDE AND ALBUTEROL SULFATE SCH ML: .5; 3 SOLUTION RESPIRATORY (INHALATION) at 11:11

## 2022-05-22 RX ADMIN — CLEVIPIDINE SCH MLS/HR: 0.5 EMULSION INTRAVENOUS at 01:56

## 2022-05-22 RX ADMIN — HEPARIN SODIUM SCH UNIT: 5000 INJECTION INTRAVENOUS; SUBCUTANEOUS at 16:12

## 2022-05-22 RX ADMIN — CHLORHEXIDINE GLUCONATE SCH ML: 1.2 RINSE ORAL at 20:51

## 2022-05-22 RX ADMIN — CEFAZOLIN SCH MLS/HR: 330 INJECTION, POWDER, FOR SOLUTION INTRAMUSCULAR; INTRAVENOUS at 01:01

## 2022-05-22 RX ADMIN — HEPARIN SODIUM SCH UNIT: 5000 INJECTION INTRAVENOUS; SUBCUTANEOUS at 09:09

## 2022-05-22 RX ADMIN — HEPARIN SODIUM SCH UNIT: 5000 INJECTION INTRAVENOUS; SUBCUTANEOUS at 00:50

## 2022-05-22 RX ADMIN — CLEVIPIDINE SCH MLS/HR: 0.5 EMULSION INTRAVENOUS at 08:40

## 2022-05-22 RX ADMIN — PANTOPRAZOLE SODIUM SCH MG: 40 INJECTION, POWDER, FOR SOLUTION INTRAVENOUS at 09:09

## 2022-05-22 RX ADMIN — IPRATROPIUM BROMIDE AND ALBUTEROL SULFATE SCH ML: .5; 3 SOLUTION RESPIRATORY (INHALATION) at 16:31

## 2022-05-22 RX ADMIN — METHYLPREDNISOLONE SODIUM SUCCINATE SCH MG: 40 INJECTION, POWDER, FOR SOLUTION INTRAMUSCULAR; INTRAVENOUS at 09:09

## 2022-05-22 RX ADMIN — IPRATROPIUM BROMIDE AND ALBUTEROL SULFATE SCH ML: .5; 3 SOLUTION RESPIRATORY (INHALATION) at 07:32

## 2022-05-22 NOTE — XR
EXAMINATION TYPE: XR chest 1V portable

 

DATE OF EXAM: 5/22/2022

 

CLINICAL HISTORY: Difficulty breathing progress study.  

 

TECHNIQUE: Single AP portable semiupright view of the chest is obtained.

 

COMPARISON: Chest x-ray from one day earlier and older studies

 

FINDINGS:  Stable endotracheal and orogastric tubes. Stable right internal jugular central venous cat
heter.

 

Visualized lungs remain grossly clear. Cardiac silhouette size remains within normal limits. Old vasu
nited fracture right mid to distal clavicle redemonstrated.

 

IMPRESSION: No acute pulmonary process. No significant change from one day earlier.

## 2022-05-22 NOTE — P.PN
Subjective


Progress Note Date: 05/22/22


Principal diagnosis: 





Acute alcohol withdrawal and influenza A infection


57-year-old male patient, current smoker, with history of COPD, EtOH and 

marijuana use, presented to the emergency department on 05/11/2022 for 

evaluation of cough, congestion, dyspnea and fever.  Patient tested positive for

influenza A, and tested negative for COVID-19 and influenza B.  He had a fever 

of 103 degrees Fahrenheit on presentation.  He was tachycardic with a rate of 

154, but in sinus mechanism.  Chest x-ray showed no active cardiopulmonary 

disease.  Patient was started on Tamiflu, IV steroids, and breathing treatments.

 However patient was starting to become agitated, and it was suspected that he 

was going through acute EtOH withdrawal, patient was given multiple doses of IV 

Ativan, to the point of unresponsiveness, and there was a concern about his 

airway protection and for that reason she was intubated and placed on mechanical

ventilator.  He was started on propofol and Versed infusion.  He is awaiting a 

bed in the ICU, he seen in the emergency department.  He is sedated and int

ubated, he is on assist-control mode of ventilation with a rate of 26, tidal 

volume 450, FiO2 100% and PEEP of 5, blood gas this morning shows pO2 of greater

than 400, pCO2 of 59, and pH of 7.19.  This was done on respiratory rate of 24 

which was subsequently increased to 26, and FiO2 was dropped down to 40%.  His 

chest x-ray today shows no active cardiopulmonary disease, ET tube 4 cm above 

the kenyetta.  Today's blood work has been reviewed, with little, is 5.5, 

hemoglobin is 12.9, serum sodium is 1:30 which is improved from 127 on 

admission, potassium is 4.2, chloride is 100, BUN is 16, creatinine 0.78.  

Troponin was 0.0-3, CRP was 5.9, proBNP was 250.  Lactic acid was 2.3, patient 

was given 1, 1/2 L in fluid boluses, currently he is on 0.0 missing a rate of 80

ML per hour, lactic acid has improved and is down to 0.7.  Owens catheter is in 

place, patient is producing adequate amount of clear and dilute urine.  This 

morning his fever is controlled, and he is 97.8, blood pressure is 98/71, he is 

in sinus mechanism with a controlled rate at 85.  He is resting comfortably, no 

evidence of grand mal seizures, he continues on thiamine replacement.





On 05/16/2022 patient seen in follow-up in the intensive care unit.  Patient was

successfully weaned and extubated from mechanical ventilator on 05/13/2022.  His

alcohol withdrawal symptoms seem to be improving, patient remains on Precedex 

infusion, which is currently down to 0.8 mics per kilo per hour.  Patient has 

required some intermittent doses of Ativan, required only 2 mg of Ativan last 

night, in addition to a single dose of morphine yesterday in the afternoon.  His

resting comfortably, he is drowsy, but he does respond to verbal stimulation, 

follows simple command.  Does not appear to be in any acute distress.  Also 

remains on Cleviprex at 4 mg per hour, and D5 half-normal saline at a rate of 80

ML per hour.  Currently on 8 L of oxygen his pulse ox is 96%.  Yesterday's chest

x-ray was showing COPD, some interstitial densities, and patchy medial right 

basilar atelectasis versus infiltrate with the possibility of potential 

pneumonia possibly related to aspiration.  Today's lab 7 reviewed showing 

electrolytes and renal profile within normal limits.  Magnesium is 1.7 it is 

being replaced per protocol.  Sputum culture showed Streptococcus pneumonia, 

blood cultures have been negative.  Patient has been started on Levaquin for 

strep pneumonia.  T-max in the last 24 hours was 99.7F, blood pressure is been 

stable.  He is in sinus mechanism with a rate of 77.  No nausea vomiting or 

diarrhea, abdomen is soft.





Reevaluated today on 5/17/2022, patient remains in the ICU, remains on 2 L nasal

cannula with O2 sat showed 94%, remains on Precedex and this is being weaned 

down, he is on Cleviprex at 2 mg per hour.  Patient is requiring intermittently 

Ativan and Haldol.  But overall the patient is doing much better.  Remains on 

Levaquin.  Patient is drowsy, but seems to be a bit more cooperative today 

compared to the last few days.  Sputum was positive for Streptococcus pneumonia,

chest x-ray showed minimal bibasilar infiltrates blood cultures have been 

negative.





Patient was reevaluated today on 5/18/2022, patient is doing fairly well, he is 

off Precedex, remains on antibiotics, remains on Tessalon Perles, patient is 

also on Ativan as needed, remains on the CIWA protocol.  Remains on updrafts, 

patient had demonstrated significant improvement in the last few days while in 

the ICU.  Hence I plan to transfer the patient out of the ICU to regular medical

floor.  And possibly consider discharge planning in the next 24-48 hours.  Basic

metabolic profile today is normal.  Patient has intermittent tachycardia and 

elevated blood pressure, hence I started the patient on metoprolol 25 mg by 

mouth twice a day





Patient was reevaluated today on 5/19/2022, patient developed worsening symptoms

of alcohol withdrawal yesterday, did not improve with multiple medications 

including Precedex, Haldol, Ativan, and the patient became more and more 

agitated, hence I recommended intubating the patient and he was placed on m

echanical ventilation in the afternoon.  Remains on mechanical ventilation his 

tidal volume is 450 rate is 26 FiO2 is 40% and PEEP of 5.  ABG showed a pO2 of 

92 pCO2 42 pH of 7.44.  Patient is on propofol at 75 mcg/kg/m IV fluid at 80 

mL/h.  He is on GI and DVT prophylaxis.  And is also on enteral feeding as per 

dietitian's recommendations WBC count is 10.2.  Hemoglobin 11.3 electrolytes are

normal renal profile is normal, chest x-ray showed COPD and minimal atelectasis 

in the lower lungs underlying pneumonia is not entirely ruled out





Reevaluated today on 5/20/22, patient remains in the ICU, patient remains 

intubated and mechanically ventilated.  He is on assist control rate of 26 tidal

volume 450 FiO2 50% PEEP of 5.  ABG showed a pO2 of 68 pCO2 of 40 pH of 7.46.  

Patient is on propofol at 75 mcg/kg/m, he is on IV fluid at 80 mL/h 0.9 normal 

saline, he is on vital a PE at 23 mL per hour.  Hemodynamically the patient is 

stable, he was on Cleviprex yesterday which has been discontinued, his 

metoprolol has been discontinued because of bradycardia overnight.  Patient is 

in sinus rhythm, not in any distress, sedated, and his chest x-ray today showed 

no evidence of infiltrate, may be a right medial lung atelectasis.  Right IJ 

central line was placed in his patient today, and follow-up chest x-ray was 

basically unremarkable.  WBC count is 8.2 hemoglobin is 10.6.  Basic metabolic 

profile is normal renal profile is normal





Reevaluated today on 5/21/22, patient remains in the ICU, intubated and 

mechanically ventilated.  Remains on assist control rate of 26, tidal volume is 

450, FiO2 40%, PEEP of 5.  ABG showed a pO2 of 63 pCO2 of 39 pH of 7.49, hence 

his FiO2 was decreased from 40% to 45%.  Patient remains on propofol at 35 but 

per kilo per minute, he is on Cleviprex, he is on IV fluid at 80 mL per hour, 

vital AF a 57 mL per hour, he is on Levaquin for streptococcal pneumonia.chest 

x-ray is showing no acute process, apparently his streptococcal pneumonia has 

resolved.in spite of propofol, the patient continues to have intermittent 

episodes of extreme agitations, at times is requiring even Ativan in addition to

his propofol.  Considering the report given to me about last night agitation in 

spite of propofol, I'm keeping the patient intubated and mechanically ventilated

sedated, and I have no plans to wean or extubated anytime soon.WBC count today 

is 14.6 and global was 11.2.basic metabolic profile is normal renal profile is 

normal.





Reevaluated today on 5/22/22, patient remains in ICU, intubated and mechanically

ventilated, assist control rate of 26 tidal volume 450 FiO2 35% PEEP of 5.  ABG 

showed a pO2 of 68 pCO2 of 40 pH of 7.49.  Propofol remains at 75 mcg/m, patient

is also on Cleviprex at 2 mg per hour, he is receiving vitamin HPI 37 mL/h, and 

his IV fluid is 0.9 normal saline at 80 mL per hour.  Chest x-ray is showing 

significant improvement of his infiltrates, patient received a full course of 

Levaquin for streptococcal pneumonia.  Today I recommended advancing the 

endotracheal tube as it seems to be high in the trachea.  And I have recommended

that we stop Levaquin.  In spite of maximal sedation, the patient continues to 

have extreme episodes of agitations, hence I have no plans to wean this patient 

as it was extremely difficult to maintain the patient called when he was off 

mechanical ventilation.  I believe the patient is not quite ready for weaning, 

and he needs to have more time on mechanical ventilation for complete 

detoxification











Objective





- Vital Signs


Vital signs: 


                                   Vital Signs











Temp  99.6 F   05/22/22 12:00


 


Pulse  114 H  05/22/22 13:00


 


Resp  23   05/22/22 13:00


 


BP  135/82   05/22/22 01:30


 


Pulse Ox  94 L  05/22/22 13:00








                                 Intake & Output











 05/21/22 05/22/22 05/22/22





 18:59 06:59 18:59


 


Intake Total 9313.606 5539.266 1154.255


 


Output Total 2565 2255 1310


 


Balance -667.583 -376.734 -155.745


 


Weight  70.4 kg 


 


Intake:   


 


   996 581


 


    Pressure Bag (0.9 Sodium 30 36 21





    Chloride)   


 


    Sodium Chloride 0.9% 1, 960 960 560





    000 ml @ 80 mls/hr IV .   





    [id number] JESUS MANUEL Rx#:[id number]   


 


  Intake, IV Titration 410.417 348.266 254.255





  Amount   


 


    Clevidipine Butyrate 25 58.667 48.266 56.267





    mg In Empty Bag 1 bag @ 2   





    MG/HR 4 mls/hr IV .   





    [id number] JESUS MANUEL Rx#:[id number]   


 


    propofoL 1,000 mg In 351.750 300 197.988





    Empty Bag 1 bag @ 5 MCG/   





    KG/MIN 2.01 mls/hr IV .   





    Q24H JESUS MANUEL Rx#:[id number]   


 


  Tube Feeding 407 444 259


 


  Other 90 90 60


 


Output:   


 


  Urine 2565 2255 1310


 


Other:   


 


  Voiding Method Indwelling Catheter Indwelling Catheter Indwelling Catheter








                       ABP, PAP, CO, CI - Last Documented











Arterial Blood Pressure        143/69

















- Exam





Physical Exam: Revealed 57-year-old white male, on propofol, basically 

maximized.  Calm, but arousable easily


HEENT:[Neck is supple.] [No neck masses.] [No thyromegaly.] [No JVD.]


Chest: [Clear breath sounds bilaterally no crackles or rhonchi or wheezes


Cardiac Exam: [Normal S1 and S2, no S3 gallop, no murmur.]


Abdomen: [Soft, nontender,  no megaly, no rebound, no guarding, normal bowel 

sounds.]


Extremities: [No clubbing, no edema, no cyanosis.]


Neurological Exam: Could not assess, patient is sedated.


Psychiatric: Could not assess, patient is sedated, on propofol


Musculoskeletal: No deformities and no limitation in range of motion.





- Labs


CBC & Chem 7: 


                                 05/22/22 05:40





                                 05/22/22 05:40


Labs: 


                  Abnormal Lab Results - Last 24 Hours (Table)











  05/21/22 05/22/22 05/22/22 Range/Units





  17:48 00:03 05:40 


 


WBC    13.2 H  (3.8-10.6)  k/uL


 


RBC    2.93 L  (4.30-5.90)  m/uL


 


Hgb    11.5 L  (13.0-17.5)  gm/dL


 


Hct    35.2 L  (39.0-53.0)  %


 


MCV    120.3 H  (80.0-100.0)  fL


 


MCH    39.4 H  (25.0-35.0)  pg


 


Plt Count    706 H  (150-450)  k/uL


 


Neutrophils #    10.5 H  (1.3-7.7)  k/uL


 


Macrocytosis    Marked A  


 


ABG pH     (7.35-7.45)  


 


ABG pO2     ()  mmHg


 


ABG HCO3     (21-25)  mmol/L


 


ABG Total CO2     (19-24)  mmol/L


 


ABG O2 Saturation     (94-97)  %


 


Carbon Dioxide     (22-30)  mmol/L


 


Creatinine     (0.66-1.25)  mg/dL


 


Glucose     (74-99)  mg/dL


 


POC Glucose (mg/dL)  127 H  100 H   (75-99)  mg/dL


 


Alkaline Phosphatase     ()  U/L


 


Albumin     (3.5-5.0)  g/dL














  05/22/22 05/22/22 05/22/22 Range/Units





  05:40 05:41 05:58 


 


WBC     (3.8-10.6)  k/uL


 


RBC     (4.30-5.90)  m/uL


 


Hgb     (13.0-17.5)  gm/dL


 


Hct     (39.0-53.0)  %


 


MCV     (80.0-100.0)  fL


 


MCH     (25.0-35.0)  pg


 


Plt Count     (150-450)  k/uL


 


Neutrophils #     (1.3-7.7)  k/uL


 


Macrocytosis     


 


ABG pH    7.49 H  (7.35-7.45)  


 


ABG pO2    68 L  ()  mmHg


 


ABG HCO3    31 H  (21-25)  mmol/L


 


ABG Total CO2    32 H  (19-24)  mmol/L


 


ABG O2 Saturation    93.9 L  (94-97)  %


 


Carbon Dioxide  31 H    (22-30)  mmol/L


 


Creatinine  0.65 L    (0.66-1.25)  mg/dL


 


Glucose  108 H    (74-99)  mg/dL


 


POC Glucose (mg/dL)   111 H   (75-99)  mg/dL


 


Alkaline Phosphatase  273 H    ()  U/L


 


Albumin  3.3 L    (3.5-5.0)  g/dL














  05/22/22 Range/Units





  11:48 


 


WBC   (3.8-10.6)  k/uL


 


RBC   (4.30-5.90)  m/uL


 


Hgb   (13.0-17.5)  gm/dL


 


Hct   (39.0-53.0)  %


 


MCV   (80.0-100.0)  fL


 


MCH   (25.0-35.0)  pg


 


Plt Count   (150-450)  k/uL


 


Neutrophils #   (1.3-7.7)  k/uL


 


Macrocytosis   


 


ABG pH   (7.35-7.45)  


 


ABG pO2   ()  mmHg


 


ABG HCO3   (21-25)  mmol/L


 


ABG Total CO2   (19-24)  mmol/L


 


ABG O2 Saturation   (94-97)  %


 


Carbon Dioxide   (22-30)  mmol/L


 


Creatinine   (0.66-1.25)  mg/dL


 


Glucose   (74-99)  mg/dL


 


POC Glucose (mg/dL)  126 H  (75-99)  mg/dL


 


Alkaline Phosphatase   ()  U/L


 


Albumin   (3.5-5.0)  g/dL








                      Microbiology - Last 24 Hours (Table)











 05/19/22 23:02 Gram Stain - Final





 Sputum Sputum Culture - Final





    Corynebacterium species














Assessment and Plan


Assessment: 





Impression:


Acute hypoxic respiratory failure, multifactorial, but this time the patient was

reintubated mostly because of his worsening alcohol withdrawal symptoms and 

delirium tremens.


Acute pneumonia secondary to Streptococcus pneumoniae, resolved based on chest 

x-ray findings, we will discontinue Levaquin.


Acute influenza infection, treated


Acute hypercapnic respiratory failure mostly related to sedation, required for 

alcohol withdrawal symptoms


Acute alcohol withdrawal and delirium tremens


Hypovolemic hyponatremia, resolved.


Underlying chronic obstructive lung disease, inactive at present.


Chronic smoker


Alcohol abuse





Recommendation:


Continue ventilatory support, no plans to wean or extubated today.  No plans to 

hold sedation mostly because the patient gets extremely agitated as soon as 

sedation interrupted


Continue nutritional support/enteral feeding


Continue GI and DVT prophylaxis


Discontinue Levaquin for streptococcal pneumonia, resolved patient received full

treatment.


Continue aspiration precautions


Continue propofol and titrate accordingly


Continue updrafts


Patient will remain in the ICU


Prognosis is guarded


Critical care time is over 30 minutes


Time with Patient: Greater than 30

## 2022-05-22 NOTE — P.PN
Subjective


Progress Note Date: 05/22/22





Patient remains intubated and sedated.  He is on propofol 75 mL an hour.





Objective





- Vital Signs


Vital signs: 


                                   Vital Signs











Temp  98.8 F   05/22/22 08:00


 


Pulse  97   05/22/22 11:31


 


Resp  20   05/22/22 11:00


 


BP  135/82   05/22/22 01:30


 


Pulse Ox  96   05/22/22 11:00








                                 Intake & Output











 05/21/22 05/22/22 05/22/22





 18:59 06:59 18:59


 


Intake Total 1246.680 7196.266 786.267


 


Output Total 2565 2255 685


 


Balance -667.583 -376.734 101.267


 


Weight  70.4 kg 


 


Intake:   


 


   996 415


 


    Pressure Bag (0.9 Sodium 30 36 15





    Chloride)   


 


    Sodium Chloride 0.9% 1, 960 960 400





    000 ml @ 80 mls/hr IV .   





    [id number] JESUS MANUEL Rx#:[id number]   


 


  Intake, IV Titration 410.417 348.266 156.267





  Amount   


 


    Clevidipine Butyrate 25 58.667 48.266 56.267





    mg In Empty Bag 1 bag @ 2   





    MG/HR 4 mls/hr IV .   





    [id number] JESUS MANUEL Rx#:[id number]   


 


    propofoL 1,000 mg In 351.750 300 100





    Empty Bag 1 bag @ 5 MCG/   





    KG/MIN 2.01 mls/hr IV .   





    Q24H JESUS MANUEL Rx#:[id number]   


 


  Tube Feeding 407 444 185


 


  Other 90 90 30


 


Output:   


 


  Urine 2565 2255 685


 


Other:   


 


  Voiding Method Indwelling Catheter Indwelling Catheter Indwelling Catheter








                       ABP, PAP, CO, CI - Last Documented











Arterial Blood Pressure        166/84

















- Exam





General examination -intubated and sedated


Heart - + S1S2  no murmurs


Lungs -mild wheezing with rhonchi throughout


Abdomen soft NT ND +ve BS


Extremities - No edema


CNS - unable to assess


Psych - unable to assess





- Labs


CBC & Chem 7: 


                                 05/22/22 05:40





                                 05/22/22 05:40


Labs: 


                  Abnormal Lab Results - Last 24 Hours (Table)











  05/21/22 05/22/22 05/22/22 Range/Units





  17:48 00:03 05:40 


 


WBC    13.2 H  (3.8-10.6)  k/uL


 


RBC    2.93 L  (4.30-5.90)  m/uL


 


Hgb    11.5 L  (13.0-17.5)  gm/dL


 


Hct    35.2 L  (39.0-53.0)  %


 


MCV    120.3 H  (80.0-100.0)  fL


 


MCH    39.4 H  (25.0-35.0)  pg


 


Plt Count    706 H  (150-450)  k/uL


 


Neutrophils #    10.5 H  (1.3-7.7)  k/uL


 


Macrocytosis    Marked A  


 


ABG pH     (7.35-7.45)  


 


ABG pO2     ()  mmHg


 


ABG HCO3     (21-25)  mmol/L


 


ABG Total CO2     (19-24)  mmol/L


 


ABG O2 Saturation     (94-97)  %


 


Carbon Dioxide     (22-30)  mmol/L


 


Creatinine     (0.66-1.25)  mg/dL


 


Glucose     (74-99)  mg/dL


 


POC Glucose (mg/dL)  127 H  100 H   (75-99)  mg/dL


 


Alkaline Phosphatase     ()  U/L


 


Albumin     (3.5-5.0)  g/dL














  05/22/22 05/22/22 05/22/22 Range/Units





  05:40 05:41 05:58 


 


WBC     (3.8-10.6)  k/uL


 


RBC     (4.30-5.90)  m/uL


 


Hgb     (13.0-17.5)  gm/dL


 


Hct     (39.0-53.0)  %


 


MCV     (80.0-100.0)  fL


 


MCH     (25.0-35.0)  pg


 


Plt Count     (150-450)  k/uL


 


Neutrophils #     (1.3-7.7)  k/uL


 


Macrocytosis     


 


ABG pH    7.49 H  (7.35-7.45)  


 


ABG pO2    68 L  ()  mmHg


 


ABG HCO3    31 H  (21-25)  mmol/L


 


ABG Total CO2    32 H  (19-24)  mmol/L


 


ABG O2 Saturation    93.9 L  (94-97)  %


 


Carbon Dioxide  31 H    (22-30)  mmol/L


 


Creatinine  0.65 L    (0.66-1.25)  mg/dL


 


Glucose  108 H    (74-99)  mg/dL


 


POC Glucose (mg/dL)   111 H   (75-99)  mg/dL


 


Alkaline Phosphatase  273 H    ()  U/L


 


Albumin  3.3 L    (3.5-5.0)  g/dL














  05/22/22 Range/Units





  11:48 


 


WBC   (3.8-10.6)  k/uL


 


RBC   (4.30-5.90)  m/uL


 


Hgb   (13.0-17.5)  gm/dL


 


Hct   (39.0-53.0)  %


 


MCV   (80.0-100.0)  fL


 


MCH   (25.0-35.0)  pg


 


Plt Count   (150-450)  k/uL


 


Neutrophils #   (1.3-7.7)  k/uL


 


Macrocytosis   


 


ABG pH   (7.35-7.45)  


 


ABG pO2   ()  mmHg


 


ABG HCO3   (21-25)  mmol/L


 


ABG Total CO2   (19-24)  mmol/L


 


ABG O2 Saturation   (94-97)  %


 


Carbon Dioxide   (22-30)  mmol/L


 


Creatinine   (0.66-1.25)  mg/dL


 


Glucose   (74-99)  mg/dL


 


POC Glucose (mg/dL)  126 H  (75-99)  mg/dL


 


Alkaline Phosphatase   ()  U/L


 


Albumin   (3.5-5.0)  g/dL














Assessment and Plan


Assessment: 





#Acute pneumonia secondary to Streptococcus pneumonia


#Acute influenza infection


#Acute hypercapnic respiratory failure 


#COPD exacerbation


#Acute respiratory distress secondary to DTs


-Continue Levaquin


-Aspiration precautions


-Patient intubated on 05/18/2022 for DTs


-Resume steroids.  Resume breathing treatments


-Wean sedation as tolerated


-ICU management





#Acute alcohol withdrawal with delirium tremens


-Patient currently intubated and sedated





#Chronic smoker





The patient is admitted with an anticipated greater than 2 midnight stay for 

evaluation of sepsis, influenza


CODE STATUS: Full Code


Anticipated discharge date: Depending on clinical course


Anticipated discharge place: Home

## 2022-05-23 LAB
ALBUMIN SERPL-MCNC: 3.5 G/DL (ref 3.5–5)
ALP SERPL-CCNC: 270 U/L (ref 38–126)
ALT SERPL-CCNC: 31 U/L (ref 4–49)
ANION GAP SERPL CALC-SCNC: 7 MMOL/L
AST SERPL-CCNC: 30 U/L (ref 17–59)
BASOPHILS # BLD AUTO: 0 K/UL (ref 0–0.2)
BASOPHILS NFR BLD AUTO: 0 %
BUN SERPL-SCNC: 16 MG/DL (ref 9–20)
CALCIUM SPEC-MCNC: 9 MG/DL (ref 8.4–10.2)
CHLORIDE SERPL-SCNC: 106 MMOL/L (ref 98–107)
CO2 BLDA-SCNC: 31 MMOL/L (ref 19–24)
CO2 SERPL-SCNC: 27 MMOL/L (ref 22–30)
EOSINOPHIL # BLD AUTO: 0 K/UL (ref 0–0.7)
EOSINOPHIL NFR BLD AUTO: 0 %
ERYTHROCYTE [DISTWIDTH] IN BLOOD BY AUTOMATED COUNT: 3.19 M/UL (ref 4.3–5.9)
ERYTHROCYTE [DISTWIDTH] IN BLOOD: 15.4 % (ref 11.5–15.5)
GLUCOSE BLD-MCNC: 111 MG/DL (ref 75–99)
GLUCOSE BLD-MCNC: 129 MG/DL (ref 75–99)
GLUCOSE BLD-MCNC: 88 MG/DL (ref 75–99)
GLUCOSE BLD-MCNC: 97 MG/DL (ref 75–99)
GLUCOSE SERPL-MCNC: 108 MG/DL (ref 74–99)
HCO3 BLDA-SCNC: 30 MMOL/L (ref 21–25)
HCT VFR BLD AUTO: 38.5 % (ref 39–53)
HGB BLD-MCNC: 12.6 GM/DL (ref 13–17.5)
LYMPHOCYTES # SPEC AUTO: 1.7 K/UL (ref 1–4.8)
LYMPHOCYTES NFR SPEC AUTO: 10 %
MCH RBC QN AUTO: 39.5 PG (ref 25–35)
MCHC RBC AUTO-ENTMCNC: 32.7 G/DL (ref 31–37)
MCV RBC AUTO: 120.6 FL (ref 80–100)
MONOCYTES # BLD AUTO: 1 K/UL (ref 0–1)
MONOCYTES NFR BLD AUTO: 6 %
NEUTROPHILS # BLD AUTO: 13.7 K/UL (ref 1.3–7.7)
NEUTROPHILS NFR BLD AUTO: 83 %
PCO2 BLDA: 39 MMHG (ref 35–45)
PH BLDA: 7.49 [PH] (ref 7.35–7.45)
PLATELET # BLD AUTO: 768 K/UL (ref 150–450)
PO2 BLDA: 67 MMHG (ref 83–108)
POTASSIUM SERPL-SCNC: 3.8 MMOL/L (ref 3.5–5.1)
PROT SERPL-MCNC: 6.6 G/DL (ref 6.3–8.2)
SODIUM SERPL-SCNC: 140 MMOL/L (ref 137–145)
WBC # BLD AUTO: 16.5 K/UL (ref 3.8–10.6)

## 2022-05-23 RX ADMIN — HEPARIN SODIUM SCH UNIT: 5000 INJECTION INTRAVENOUS; SUBCUTANEOUS at 08:02

## 2022-05-23 RX ADMIN — CLEVIPIDINE SCH MLS/HR: 0.5 EMULSION INTRAVENOUS at 02:28

## 2022-05-23 RX ADMIN — METHYLPREDNISOLONE SODIUM SUCCINATE SCH MG: 40 INJECTION, POWDER, FOR SOLUTION INTRAMUSCULAR; INTRAVENOUS at 08:01

## 2022-05-23 RX ADMIN — PANTOPRAZOLE SODIUM SCH MG: 40 INJECTION, POWDER, FOR SOLUTION INTRAVENOUS at 08:02

## 2022-05-23 RX ADMIN — CEFAZOLIN SCH MLS/HR: 330 INJECTION, POWDER, FOR SOLUTION INTRAMUSCULAR; INTRAVENOUS at 16:46

## 2022-05-23 RX ADMIN — CEFAZOLIN SCH MLS/HR: 330 INJECTION, POWDER, FOR SOLUTION INTRAMUSCULAR; INTRAVENOUS at 10:13

## 2022-05-23 RX ADMIN — IPRATROPIUM BROMIDE AND ALBUTEROL SULFATE SCH ML: .5; 3 SOLUTION RESPIRATORY (INHALATION) at 11:20

## 2022-05-23 RX ADMIN — IPRATROPIUM BROMIDE AND ALBUTEROL SULFATE SCH ML: .5; 3 SOLUTION RESPIRATORY (INHALATION) at 07:28

## 2022-05-23 RX ADMIN — CLEVIPIDINE SCH MLS/HR: 0.5 EMULSION INTRAVENOUS at 20:47

## 2022-05-23 RX ADMIN — HEPARIN SODIUM SCH UNIT: 5000 INJECTION INTRAVENOUS; SUBCUTANEOUS at 16:45

## 2022-05-23 RX ADMIN — CLEVIPIDINE SCH MLS/HR: 0.5 EMULSION INTRAVENOUS at 05:24

## 2022-05-23 RX ADMIN — IPRATROPIUM BROMIDE AND ALBUTEROL SULFATE SCH ML: .5; 3 SOLUTION RESPIRATORY (INHALATION) at 14:59

## 2022-05-23 RX ADMIN — IPRATROPIUM BROMIDE AND ALBUTEROL SULFATE SCH ML: .5; 3 SOLUTION RESPIRATORY (INHALATION) at 19:38

## 2022-05-23 RX ADMIN — HEPARIN SODIUM SCH UNIT: 5000 INJECTION INTRAVENOUS; SUBCUTANEOUS at 00:43

## 2022-05-23 RX ADMIN — CHLORHEXIDINE GLUCONATE SCH ML: 1.2 RINSE ORAL at 08:02

## 2022-05-23 RX ADMIN — CHLORHEXIDINE GLUCONATE SCH ML: 1.2 RINSE ORAL at 20:47

## 2022-05-23 NOTE — P.PN
Subjective


Progress Note Date: 05/23/22





Patient remains intubated and sedated.  Patient started on Klonopin and is being

weaned off the propofol.  He has completed his antibiotic course for his 

streptococcus pneumonia.





Objective





- Vital Signs


Vital signs: 


                                   Vital Signs











Temp  98.3 F   05/23/22 08:00


 


Pulse  98   05/23/22 11:31


 


Resp  26 H  05/23/22 11:00


 


BP  125/91   05/23/22 11:00


 


Pulse Ox  94 L  05/23/22 11:00


 


FiO2  35   05/23/22 11:31








                                 Intake & Output











 05/22/22 05/23/22 05/23/22





 18:59 06:59 18:59


 


Intake Total 4803.538 8222.024 8586.184


 


Output Total 2135 1645 180


 


Balance -230.019 027.682 3097.184


 


Weight  66.2 kg 


 


Intake:   


 


   123 0030


 


    Pressure Bag (0.9 Sodium 36 36 15





    Chloride)   


 


    Sodium Chloride 0.9% 1, 





    000 ml @ 80 mls/hr IV .   





    [id number] JESUS MANUEL Rx#:[id number]   


 


  Intake, IV Titration 374.981 454.024 97.184





  Amount   


 


    Clevidipine Butyrate 25 84.734 87.733 





    mg In Empty Bag 1 bag @ 2   





    MG/HR 4 mls/hr IV .   





    [id number] JESUS MANUEL Rx#:[id number]   


 


    propofoL 1,000 mg In 290.247 366.291 97.184





    Empty Bag 1 bag @ 5 MCG/   





    KG/MIN 2.01 mls/hr IV .   





    Q24H JESUSM ANUEL Rx#:[id number]   


 


  Tube Feeding 444 444 74


 


  Other 90 90 


 


Output:   


 


  Urine 2135 1645 180


 


Other:   


 


  Voiding Method Indwelling Catheter Indwelling Catheter Indwelling Catheter








                       ABP, PAP, CO, CI - Last Documented











Arterial Blood Pressure        148/82

















- Exam





General examination -intubated and sedated


Heart - + S1S2  no murmurs


Lungs -mild wheezing with rhonchi throughout


Abdomen soft NT ND +ve BS


Extremities - No edema


CNS - unable to assess


Psych - unable to assess





- Labs


CBC & Chem 7: 


                                 05/23/22 05:00





                                 05/23/22 05:00


Labs: 


                  Abnormal Lab Results - Last 24 Hours (Table)











  05/22/22 05/22/22 05/23/22 Range/Units





  11:48 17:45 05:00 


 


WBC    16.5 H  (3.8-10.6)  k/uL


 


RBC    3.19 L  (4.30-5.90)  m/uL


 


Hgb    12.6 L  (13.0-17.5)  gm/dL


 


Hct    38.5 L  (39.0-53.0)  %


 


MCV    120.6 H  (80.0-100.0)  fL


 


MCH    39.5 H  (25.0-35.0)  pg


 


Plt Count    768 H  (150-450)  k/uL


 


Neutrophils #    13.7 H  (1.3-7.7)  k/uL


 


Macrocytosis    Marked A  


 


ABG pH     (7.35-7.45)  


 


ABG pO2     ()  mmHg


 


ABG HCO3     (21-25)  mmol/L


 


ABG Total CO2     (19-24)  mmol/L


 


ABG O2 Saturation     (94-97)  %


 


Creatinine     (0.66-1.25)  mg/dL


 


Glucose     (74-99)  mg/dL


 


POC Glucose (mg/dL)  126 H  112 H   (75-99)  mg/dL


 


Alkaline Phosphatase     ()  U/L














  05/23/22 05/23/22 05/23/22 Range/Units





  05:00 05:08 05:55 


 


WBC     (3.8-10.6)  k/uL


 


RBC     (4.30-5.90)  m/uL


 


Hgb     (13.0-17.5)  gm/dL


 


Hct     (39.0-53.0)  %


 


MCV     (80.0-100.0)  fL


 


MCH     (25.0-35.0)  pg


 


Plt Count     (150-450)  k/uL


 


Neutrophils #     (1.3-7.7)  k/uL


 


Macrocytosis     


 


ABG pH    7.49 H  (7.35-7.45)  


 


ABG pO2    67 L  ()  mmHg


 


ABG HCO3    30 H  (21-25)  mmol/L


 


ABG Total CO2    31 H  (19-24)  mmol/L


 


ABG O2 Saturation    93.2 L  (94-97)  %


 


Creatinine  0.63 L    (0.66-1.25)  mg/dL


 


Glucose  108 H    (74-99)  mg/dL


 


POC Glucose (mg/dL)   111 H   (75-99)  mg/dL


 


Alkaline Phosphatase  270 H    ()  U/L








                      Microbiology - Last 24 Hours (Table)











 05/19/22 23:02 Gram Stain - Final





 Sputum Sputum Culture - Final





    Corynebacterium species














Assessment and Plan


Assessment: 





#Acute pneumonia secondary to Streptococcus pneumonia


#Acute influenza infection


#Acute hypercapnic respiratory failure 


#COPD exacerbation


#Acute respiratory distress secondary to DTs


-Patient completed his antibiotic course. 


-Patient also completed his antiviral course


-Aspiration precautions


-Patient intubated on 05/18/2022 for DTs


-Resume steroids.  Resume breathing treatments


-Wean sedation as tolerated.  Patient started on Klonopin to help wean off the 

propofol


-ICU management





#Acute alcohol withdrawal with delirium tremens


-Patient currently intubated and sedated





#Chronic smoker





The patient is admitted with an anticipated greater than 2 midnight stay for 

evaluation of sepsis, influenza


CODE STATUS: Full Code


Anticipated discharge date: Depending on clinical course


Anticipated discharge place: Home

## 2022-05-23 NOTE — XR
EXAMINATION TYPE: XR chest 1V portable

 

DATE OF EXAM: 5/23/2022

 

CLINICAL HISTORY: Difficulty breathing progress study.  

 

TECHNIQUE: 2 frontal portable semiupright views of the chest are obtained.

 

COMPARISON: Chest x-ray from one day earlier and older studies

 

FINDINGS:  Stable endotracheal and orogastric tubes. Stable right internal jugular central venous cat
heter.

 

Visualized lungs remain grossly clear. Cardiac silhouette size remains within normal limits. Old vasu
nion fracture right mid to distal clavicle redemonstrated.

 

IMPRESSION: No acute pulmonary process. No significant change from one day earlier.

## 2022-05-23 NOTE — P.PN
Subjective


Progress Note Date: 05/23/22








Acute alcohol withdrawal and influenza A infection





57-year-old male patient, current smoker, with history of COPD, EtOH and 

marijuana use, presented to the emergency department on 05/11/2022 for evaluatio

n of cough, congestion, dyspnea and fever.  Patient tested positive for 

influenza A, and tested negative for COVID-19 and influenza B.  He had a fever 

of 103 degrees Fahrenheit on presentation.  He was tachycardic with a rate of 

154, but in sinus mechanism.  Chest x-ray showed no active cardiopulmonary 

disease.  Patient was started on Tamiflu, IV steroids, and breathing treatments.

 However patient was starting to become agitated, and it was suspected that he 

was going through acute EtOH withdrawal, patient was given multiple doses of IV 

Ativan, to the point of unresponsiveness, and there was a concern about his 

airway protection and for that reason she was intubated and placed on mechanical

ventilator.  He was treated for DTs and the patient was also treated for 

streptococcal pneumonia and the patient was Patient was successfully weaned and 

extubated from mechanical ventilator on 05/13/2022. The did not improve with 

multiple medications including Precedex, Haldol, Ativan, and the patient became 

more and more agitated, reintubated on 5/19/22 he was placed on mechanical 

ventilation





on 5/23/22, patient remains in ICU, she is currently on a propofol which is 

running at 50 mg/kg/m.  The patient remains intubated on a mechanical 

ventilator.  This morning, the patient is on a assist-control mode at the rate 

of 26 with a tidal volume of 450 and FiO2 of 35% with a PEEP of 5.  The chest x-

ray from today is showing adequate expansion of both lungs.  ET tube is in a 

good location.  The patient has a right IJ triple-lumen catheter in place.  No 

acute airspace disease or consolidation seen.  No evidence of any pneumothorax. 

The blood gases from today is showing a pH of 7.49 with a pCO2 of 39 and pO2 of 

57 and this was done and FiO2 of 35%.  The peak airway pressure is around 25 cm 

of water.  Hemodynamically, the patient is in a sinus rhythm.  He is on no 

pressors for now.  His agitation is well-controlled.  He is having an adequate 

blood pressure control.  He was on Cleviprex and this was discontinued earlier 

this morning because of some underlying hypotension.  As such, the patient is 

currently off the Cleviprex drip.  The patient is also on bronchodilators.  The 

patient is on IV Solu Medrol regarding his COPD exacerbation at a dose of 40 mg 

every 24 hours.  Antibiotic course has been discontinued as the patient was 

treated for influenza A with Tamiflu and the patient also had a tubal infection 

with Streptococcus pneumonia on 05/12/2022 and the patient completed the course 

of Zosyn.  Currently is on no antibiotics.  His IV fluids are running at 80 mL 

an hour of normal saline and the patient has been in a negative fluid balance of

1 L over the past 24 hours.  He is afebrile.  No seizure activity has been 

noted.   





Objective





- Vital Signs


Vital signs: 


                                   Vital Signs











Temp  98.3 F   05/23/22 08:00


 


Pulse  96   05/23/22 09:00


 


Resp  26 H  05/23/22 09:00


 


BP  93/63   05/23/22 09:00


 


Pulse Ox  99   05/23/22 09:00


 


FiO2  35   05/23/22 08:00








                                 Intake & Output











 05/22/22 05/23/22 05/23/22





 18:59 06:59 18:59


 


Intake Total 2126.599 6332.024 129.246


 


Output Total 2135 1645 50


 


Balance -230.019 339.024 79.246


 


Weight  66.2 kg 


 


Intake:   


 


   996 83


 


    Pressure Bag (0.9 Sodium 36 36 3





    Chloride)   


 


    Sodium Chloride 0.9% 1, 960 960 80





    000 ml @ 80 mls/hr IV .   





    [id number] JES[facility] MANUEL Rx#:[id number]   


 


  Intake, IV Titration 374.981 454.024 9.246





  Amount   


 


    Clevidipine Butyrate 25 84.734 87.733 





    mg In Empty Bag 1 bag @ 2   





    MG/HR 4 mls/hr IV .   





    [id number] JESUS MANUEL Rx#:[id number]   


 


    propofoL 1,000 mg In 290.247 366.291 9.246





    Empty Bag 1 bag @ 5 MCG/   





    KG/MIN 2.01 mls/hr IV .   





    Q24H JESUS MANUEL Rx#:[id number]   


 


  Tube Feeding 444 444 37


 


  Other 90 90 


 


Output:   


 


  Urine 2135 1645 50


 


Other:   


 


  Voiding Method Indwelling Catheter Indwelling Catheter 








                       ABP, PAP, CO, CI - Last Documented











Arterial Blood Pressure        101/82

















- Exam








Physical Exam: Revealed 57-year-old white male, on propofol, basically maxim

ized.  Calm, but arousable easily


Head exam was generally normal. There was no scleral icterus or corneal arcus. 

Mucous membranes were moist.


Neck was supple and without jugular venous distension, thyromegaly, or carotid 

bruits. Carotids were easily palpable bilaterally. There was no adenopathy.


Chest: [Clear breath sounds bilaterally no crackles or rhonchi or wheezes


Cardiac Exam: [Normal S1 and S2, no S3 gallop, no murmur.]


Abdomen:  Soft, nontender,  no megaly, no rebound, no guarding, normal bowel 

sounds. 


Extremities: [No clubbing, no edema, no cyanosis. 


Neurological Exam: Could not assess, patient is sedated.


Psychiatric: Could not assess, patient is sedated, on propofol


Musculoskeletal: No deformities and no limitation in range of motion.








- Labs


CBC & Chem 7: 


                                 05/23/22 05:00





                                 05/23/22 05:00


Labs: 


                  Abnormal Lab Results - Last 24 Hours (Table)











  05/22/22 05/22/22 05/23/22 Range/Units





  11:48 17:45 05:00 


 


WBC    16.5 H  (3.8-10.6)  k/uL


 


RBC    3.19 L  (4.30-5.90)  m/uL


 


Hgb    12.6 L  (13.0-17.5)  gm/dL


 


Hct    38.5 L  (39.0-53.0)  %


 


MCV    120.6 H  (80.0-100.0)  fL


 


MCH    39.5 H  (25.0-35.0)  pg


 


Plt Count    768 H  (150-450)  k/uL


 


Neutrophils #    13.7 H  (1.3-7.7)  k/uL


 


Macrocytosis    Marked A  


 


ABG pH     (7.35-7.45)  


 


ABG pO2     ()  mmHg


 


ABG HCO3     (21-25)  mmol/L


 


ABG Total CO2     (19-24)  mmol/L


 


ABG O2 Saturation     (94-97)  %


 


Creatinine     (0.66-1.25)  mg/dL


 


Glucose     (74-99)  mg/dL


 


POC Glucose (mg/dL)  126 H  112 H   (75-99)  mg/dL


 


Alkaline Phosphatase     ()  U/L














  05/23/22 05/23/22 05/23/22 Range/Units





  05:00 05:08 05:55 


 


WBC     (3.8-10.6)  k/uL


 


RBC     (4.30-5.90)  m/uL


 


Hgb     (13.0-17.5)  gm/dL


 


Hct     (39.0-53.0)  %


 


MCV     (80.0-100.0)  fL


 


MCH     (25.0-35.0)  pg


 


Plt Count     (150-450)  k/uL


 


Neutrophils #     (1.3-7.7)  k/uL


 


Macrocytosis     


 


ABG pH    7.49 H  (7.35-7.45)  


 


ABG pO2    67 L  ()  mmHg


 


ABG HCO3    30 H  (21-25)  mmol/L


 


ABG Total CO2    31 H  (19-24)  mmol/L


 


ABG O2 Saturation    93.2 L  (94-97)  %


 


Creatinine  0.63 L    (0.66-1.25)  mg/dL


 


Glucose  108 H    (74-99)  mg/dL


 


POC Glucose (mg/dL)   111 H   (75-99)  mg/dL


 


Alkaline Phosphatase  270 H    ()  U/L








                      Microbiology - Last 24 Hours (Table)











 05/19/22 23:02 Gram Stain - Final





 Sputum Sputum Culture - Final





    Corynebacterium species














Assessment and Plan


Plan: 








Acute hypoxic respiratory failure, multifactorial, but this time the patient was

 reintubated mostly because of his worsening alcohol withdrawal symptoms and del

irium tremens.  The patient remains on a mechanical ventilator.  No signs of 

bronchospasm or wheezing.  No signs of pneumonia.  Peak and static pressures are

 low and the patient is hemodynamically stable.  He remains intubation as the 

patient was very difficult to control his agitation due to ongoing issues with 

delirium tremens.





Acute pneumonia secondary to Streptococcus pneumoniae, resolved based on chest 

x-ray findings, we will discontinue Levaquin.





Acute influenza infection, treated





Acute hypercapnic respiratory failure mostly related to sedation, required for 

alcohol withdrawal symptoms





Acute alcohol withdrawal and delirium tremens





Hypovolemic hyponatremia, resolved.





Underlying chronic obstructive lung disease, inactive at present.





Chronic smoker





Alcohol abuse





Recommendation:





Continue ventilatory support


Sadation holiday


Assess mentation


We anticipate extensive education on this patient once taken off the sedation.  

I'm suggesting Klonopin 1 mg twice a day and gradually wean off propofol.  If he

 is able to wean adequately, the patient should be able to extubate.  His p

ulmonary status is stable for now.  


Continue nutritional support/enteral feeding


Continue GI and DVT prophylaxis


Discontinue Levaquin for streptococcal pneumonia, resolved patient received full

 treatment.


Continue aspiration precautions


Continue propofol and titrate accordingly


Continue updrafts


Patient will remain in the ICU


Prognosis is guarded


Critical care time is over 30 minutes





Time with Patient: Greater than 30


Time with Patient: Greater than 30

## 2022-05-24 LAB
ANION GAP SERPL CALC-SCNC: 6 MMOL/L
BUN SERPL-SCNC: 13 MG/DL (ref 9–20)
CALCIUM SPEC-MCNC: 9.1 MG/DL (ref 8.4–10.2)
CHLORIDE SERPL-SCNC: 102 MMOL/L (ref 98–107)
CO2 SERPL-SCNC: 29 MMOL/L (ref 22–30)
ERYTHROCYTE [DISTWIDTH] IN BLOOD BY AUTOMATED COUNT: 3.1 M/UL (ref 4.3–5.9)
ERYTHROCYTE [DISTWIDTH] IN BLOOD: 14.9 % (ref 11.5–15.5)
GLUCOSE BLD-MCNC: 106 MG/DL (ref 75–99)
GLUCOSE BLD-MCNC: 120 MG/DL (ref 75–99)
GLUCOSE BLD-MCNC: 85 MG/DL (ref 75–99)
GLUCOSE BLD-MCNC: 88 MG/DL (ref 75–99)
GLUCOSE BLD-MCNC: 89 MG/DL (ref 75–99)
GLUCOSE SERPL-MCNC: 86 MG/DL (ref 74–99)
HCT VFR BLD AUTO: 37 % (ref 39–53)
HGB BLD-MCNC: 12.1 GM/DL (ref 13–17.5)
MCH RBC QN AUTO: 39 PG (ref 25–35)
MCHC RBC AUTO-ENTMCNC: 32.6 G/DL (ref 31–37)
MCV RBC AUTO: 119.5 FL (ref 80–100)
PLATELET # BLD AUTO: 584 K/UL (ref 150–450)
POTASSIUM SERPL-SCNC: 3.8 MMOL/L (ref 3.5–5.1)
SODIUM SERPL-SCNC: 137 MMOL/L (ref 137–145)
WBC # BLD AUTO: 13.3 K/UL (ref 3.8–10.6)

## 2022-05-24 RX ADMIN — HEPARIN SODIUM SCH UNIT: 5000 INJECTION INTRAVENOUS; SUBCUTANEOUS at 08:55

## 2022-05-24 RX ADMIN — HEPARIN SODIUM SCH UNIT: 5000 INJECTION INTRAVENOUS; SUBCUTANEOUS at 00:03

## 2022-05-24 RX ADMIN — HEPARIN SODIUM SCH UNIT: 5000 INJECTION INTRAVENOUS; SUBCUTANEOUS at 16:02

## 2022-05-24 RX ADMIN — PANTOPRAZOLE SODIUM SCH MG: 40 INJECTION, POWDER, FOR SOLUTION INTRAVENOUS at 08:55

## 2022-05-24 RX ADMIN — IPRATROPIUM BROMIDE AND ALBUTEROL SULFATE SCH ML: .5; 3 SOLUTION RESPIRATORY (INHALATION) at 11:18

## 2022-05-24 RX ADMIN — IPRATROPIUM BROMIDE AND ALBUTEROL SULFATE SCH: .5; 3 SOLUTION RESPIRATORY (INHALATION) at 19:17

## 2022-05-24 RX ADMIN — CEFAZOLIN SCH MLS/HR: 330 INJECTION, POWDER, FOR SOLUTION INTRAMUSCULAR; INTRAVENOUS at 02:42

## 2022-05-24 RX ADMIN — IPRATROPIUM BROMIDE AND ALBUTEROL SULFATE SCH ML: .5; 3 SOLUTION RESPIRATORY (INHALATION) at 07:21

## 2022-05-24 RX ADMIN — METHYLPREDNISOLONE SODIUM SUCCINATE SCH MG: 40 INJECTION, POWDER, FOR SOLUTION INTRAMUSCULAR; INTRAVENOUS at 08:56

## 2022-05-24 RX ADMIN — CLEVIPIDINE SCH MLS/HR: 0.5 EMULSION INTRAVENOUS at 03:35

## 2022-05-24 RX ADMIN — CHLORHEXIDINE GLUCONATE SCH: 1.2 RINSE ORAL at 08:55

## 2022-05-24 RX ADMIN — POTASSIUM CHLORIDE SCH MLS/HR: 7.46 INJECTION, SOLUTION INTRAVENOUS at 06:51

## 2022-05-24 RX ADMIN — POTASSIUM CHLORIDE SCH MLS/HR: 7.46 INJECTION, SOLUTION INTRAVENOUS at 08:55

## 2022-05-24 RX ADMIN — IPRATROPIUM BROMIDE AND ALBUTEROL SULFATE SCH ML: .5; 3 SOLUTION RESPIRATORY (INHALATION) at 16:02

## 2022-05-24 NOTE — P.PN
Subjective


Progress Note Date: 05/24/22








Acute alcohol withdrawal and influenza A infection





57-year-old male patient, current smoker, with history of COPD, EtOH and 

marijuana use, presented to the emergency department on 05/11/2022 for evaluatio

n of cough, congestion, dyspnea and fever.  Patient tested positive for 

influenza A, and tested negative for COVID-19 and influenza B.  He had a fever 

of 103 degrees Fahrenheit on presentation.  He was tachycardic with a rate of 

154, but in sinus mechanism.  Chest x-ray showed no active cardiopulmonary 

disease.  Patient was started on Tamiflu, IV steroids, and breathing treatments.

 However patient was starting to become agitated, and it was suspected that he 

was going through acute EtOH withdrawal, patient was given multiple doses of IV 

Ativan, to the point of unresponsiveness, and there was a concern about his 

airway protection and for that reason she was intubated and placed on mechanical

ventilator.  He was treated for DTs and the patient was also treated for 

streptococcal pneumonia and the patient was Patient was successfully weaned and 

extubated from mechanical ventilator on 05/13/2022. The did not improve with 

multiple medications including Precedex, Haldol, Ativan, and the patient became 

more and more agitated, reintubated on 5/19/22 he was placed on mechanical 

ventilation





on 5/23/22, patient remains in ICU, she is currently on a propofol which is 

running at 50 mg/kg/m.  The patient remains intubated on a mechanical 

ventilator.  This morning, the patient is on a assist-control mode at the rate 

of 26 with a tidal volume of 450 and FiO2 of 35% with a PEEP of 5.  The chest x-

ray from today is showing adequate expansion of both lungs.  ET tube is in a 

good location.  The patient has a right IJ triple-lumen catheter in place.  No 

acute airspace disease or consolidation seen.  No evidence of any pneumothorax. 

The blood gases from today is showing a pH of 7.49 with a pCO2 of 39 and pO2 of 

57 and this was done and FiO2 of 35%.  The peak airway pressure is around 25 cm 

of water.  Hemodynamically, the patient is in a sinus rhythm.  He is on no 

pressors for now.  His agitation is well-controlled.  He is having an adequate 

blood pressure control.  He was on Cleviprex and this was discontinued earlier 

this morning because of some underlying hypotension.  As such, the patient is 

currently off the Cleviprex drip.  The patient is also on bronchodilators.  The 

patient is on IV Solu Medrol regarding his COPD exacerbation at a dose of 40 mg 

every 24 hours.  Antibiotic course has been discontinued as the patient was 

treated for influenza A with Tamiflu and the patient also had a tubal infection 

with Streptococcus pneumonia on 05/12/2022 and the patient completed the course 

of Zosyn.  Currently is on no antibiotics.  His IV fluids are running at 80 mL 

an hour of normal saline and the patient has been in a negative fluid balance of

1 L over the past 24 hours.  He is afebrile.  No seizure activity has been 

noted.   





05/24/2022, the patient is extubated and the patient is currently being 

monitored in the intensive care unit.  The patient was a case of COPD exac

erbation secondary to influenza A and subsequent streptococcal pneumonia.  The 

patient completed a course of Tamiflu and the patient was also treated with IV 

Zosyn.  The patient managed to wean himself off the mechanical ventilator 

yesterday and the patient was extubated and currently he is resting comfortably 

with O2 at 2 L in the patient's pulse ox is around 99%.  Weaning is nonlabored 

and the patient is hemodynamically stable and the patient is afebrile.  The 

white cell count is at 13.3 with hemoglobin of 12.1.  Electrodes are all within 

normal limits.  Renal function is normal.  The patient is also off Cleviprex and

the blood pressure is adequate and controlled for now.  He remains on br

onchodilators.  He remains on steroids and the dose was reduced to 40 mg IV 

every 24 hours.  IV fluids are still running at the rate of 80 mL an hour.  Will

start  diet.  He is following commands.  Answering questions appropriately.  No 

other significant events over the past 24 hours.





Objective





- Vital Signs


Vital signs: 


                                   Vital Signs











Temp  98.5 F   05/24/22 08:00


 


Pulse  66   05/24/22 09:00


 


Resp  16   05/24/22 09:00


 


BP  139/72   05/24/22 09:00


 


Pulse Ox  99   05/24/22 09:00


 


FiO2  35   05/23/22 12:00








                                 Intake & Output











 05/23/22 05/24/22 05/24/22





 18:59 06:59 18:59


 


Intake Total 9264.130 1122.1 287


 


Output Total 1435 2285 150


 


Balance 7829.130 -1162.9 137


 


Weight 66.2 kg 65.7 kg 


 


Intake:   


 


  IV 9079 1096 266


 


    Potassium Chloride 10 meq  100 100





    In Water For Injection 1   





    100ml.bag @ 100 mls/hr   





    IVPB Q1H JESUS MANUEL Rx#:   





    [id number]   


 


    Pressure Bag (0.9 Sodium 39 36 6





    Chloride)   


 


    Sodium Chloride 0.9% 1, 9040 960 160





    000 ml @ 80 mls/hr IV .   





    [id number] JESUS MANUEL Rx#:[id number]   


 


  Intake, IV Titration 111.130 26.1 21





  Amount   


 


    Clevidipine Butyrate 25 11.433 26.1 21





    mg In Empty Bag 1 bag @ 2   





    MG/HR 4 mls/hr IV .   





    [id number] JESUS MANUEL Rx#:[id number]   


 


    propofoL 1,000 mg In 99.697  





    Empty Bag 1 bag @ 5 MCG/   





    KG/MIN 2.01 mls/hr IV .   





    Q24H JESUS MANUEL Rx#:[id number]   


 


  Tube Feeding 74  


 


Output:   


 


  Urine 1435 2285 150


 


Other:   


 


  Voiding Method Indwelling Catheter Indwelling Catheter 








                       ABP, PAP, CO, CI - Last Documented











Arterial Blood Pressure        155/73

















- Exam








Physical Exam: Revealed 57-year-old white male, on  a dose of oxygen by nasal 

cannula


Head exam was generally normal. There was no scleral icterus or corneal arcus. 

Mucous membranes were moist.


Neck was supple and without jugular venous distension, thyromegaly, or carotid 

bruits. Carotids were easily palpable bilaterally. There was no adenopathy.


Chest: Diminished breath sounds, no significant wheezing at this point in time


Cardiac Exam: [Normal S1 and S2, no S3 gallop, no murmur.]


Abdomen:  Soft, nontender,  no megaly, no rebound, no guarding, normal bowel 

sounds. 


Extremities: [No clubbing, no edema, no cyanosis. 


Neurologically, the patient is awake and alert and the patient does not have any

 focal neurological deficit.  Cranial nerves are essentially intact.


Psychiatric: Could not assess, patient is sedated, on propofol


Musculoskeletal: No deformities and no limitation in range of motion.








- Labs


CBC & Chem 7: 


                                 05/24/22 05:00





                                 05/24/22 05:00


Labs: 


                  Abnormal Lab Results - Last 24 Hours (Table)











  05/23/22 05/24/22 05/24/22 Range/Units





  11:37 05:00 05:00 


 


WBC   13.3 H   (3.8-10.6)  k/uL


 


RBC   3.10 L   (4.30-5.90)  m/uL


 


Hgb   12.1 L   (13.0-17.5)  gm/dL


 


Hct   37.0 L   (39.0-53.0)  %


 


MCV   119.5 H   (80.0-100.0)  fL


 


MCH   39.0 H   (25.0-35.0)  pg


 


Plt Count   584 H   (150-450)  k/uL


 


Macrocytosis   Marked A   


 


Creatinine    0.59 L  (0.66-1.25)  mg/dL


 


POC Glucose (mg/dL)  129 H    (75-99)  mg/dL














Assessment and Plan


Plan: 








Acute hypoxic respiratory failure, multifactorial, but this time the patient was

 reintubated mostly because of his worsening alcohol withdrawal symptoms and 

delirium tremens.  The patient  was weaned off the mechanical ventilator and the

 patient is currently on O2 at 2 L chest x-ray from today showing no acute 

abnormalities.  No acute process.





Acute pneumonia secondary to Streptococcus pneumoniae, resolved based on chest 

x-ray findings, we will discontinue Levaquin.





Acute influenza infection, treated





Acute hypercapnic respiratory failure mostly related to sedation, required for 

alcohol withdrawal symptoms





Acute alcohol withdrawal and delirium tremens





Hypovolemic hyponatremia, resolved.





Underlying chronic obstructive lung disease, inactive at present.





Chronic smoker





Alcohol abuse





Recommendation:








Extubated to 2 L of oxygen by nasal cannula


Provide an incentive spirometer


Stop the IV Solu Medrol start the patient prednisone burst taper


Provide the patient with diet


Discontinue the arterial line


Continue GI and DVT prophylaxis


Discontinue Levaquin for streptococcal pneumonia, resolved patient received full

 treatment.


Continue breathing treatments


IV fluids to KVO


His mobility to be increased


May transfer out of the intensive care unit





Time with Patient: Greater than 30

## 2022-05-24 NOTE — XR
EXAMINATION TYPE: XR chest 1V portable

 

DATE OF EXAM: 5/24/2022

 

Comparison: 5/23/2022

 

Clinical History: 57 year-old male tube placement

 

Findings:

Heart normal size. Mild hyperinflation. Right CVC tip at the lower SVC level. No consolidation or ple
ural effusion seen. Interval extubation and removal of NG tube.

 

 

Impression:

Suspect underlying COPD. No acute process seen. Interval extubation and removal of NG tube.

## 2022-05-24 NOTE — P.PN
Subjective


Progress Note Date: 05/24/22


Principal diagnosis: 


Patient is sleepy, patient was extubated.  He does not appear to be in distress.








Objective





- Vital Signs


Vital signs: 


                                   Vital Signs











Temp  97.7 F   05/24/22 14:00


 


Pulse  92   05/24/22 14:00


 


Resp  14   05/24/22 14:00


 


BP  94/65   05/24/22 14:00


 


Pulse Ox  95   05/24/22 14:00


 


FiO2  35   05/23/22 12:00








                                 Intake & Output











 05/23/22 05/24/22 05/24/22





 18:59 06:59 18:59


 


Intake Total 9264.130 1122.1 287


 


Output Total 1435 2285 150


 


Balance 7829.130 -1162.9 137


 


Weight 66.2 kg 65.7 kg 


 


Intake:   


 


  IV 9079 1096 266


 


    Potassium Chloride 10 meq  100 100





    In Water For Injection 1   





    100ml.bag @ 100 mls/hr   





    IVPB Q1H [facility] Rx#:   





    [id number]   


 


    Pressure Bag (0.9 Sodium 39 36 6





    Chloride)   


 


    Sodium Chloride 0.9% 1, 9040 960 160





    000 ml @ 80 mls/hr IV .   





    [id number] [facility] Rx#:[id number]   


 


  Intake, IV Titration 111.130 26.1 21





  Amount   


 


    Clevidipine Butyrate 25 11.433 26.1 21





    mg In Empty Bag 1 bag @ 2   





    MG/HR 4 mls/hr IV .   





    [id number] J[facility] Rx#:[id number]   


 


    propofoL 1,000 mg In 99.697  





    Empty Bag 1 bag @ 5 MCG/   





    KG/MIN 2.01 mls/hr IV .   





    Q24H JES[facility] MANUEL Rx#:[id number]   


 


  Tube Feeding 74  


 


Output:   


 


  Urine 1435 2285 150


 


Other:   


 


  Voiding Method Indwelling Catheter Indwelling Catheter 








                       ABP, PAP, CO, CI - Last Documented











Arterial Blood Pressure        155/73

















- Exam





Physical Exam: Sleepy, next week.  Does not appear to be in distress.


Head exam was generally normal. There was no scleral icterus or corneal arcus. 

Mucous membranes were moist.


Neck was supple and without jugular venous distension


Chest: Decreased breath sounds, no wheezing


Cardiac Exam: [Normal S1 and S2, no S3 gallop, no murmur.]


Abdomen:  Soft, nontender,  no megaly, no rebound, no guarding, normal bowel 

sounds. 


Extremities: [No clubbing, no edema, no cyanosis. 


Neurologically, the patient is awake and alert and the patient does not have any

 focal neurological deficit.  Cranial nerves are essentially intact.


Musculoskeletal: No deformities 








- Labs


CBC & Chem 7: 


                                 05/24/22 05:00





                                 05/24/22 05:00


Labs: 


                  Abnormal Lab Results - Last 24 Hours (Table)











  05/24/22 05/24/22 Range/Units





  05:00 05:00 


 


WBC  13.3 H   (3.8-10.6)  k/uL


 


RBC  3.10 L   (4.30-5.90)  m/uL


 


Hgb  12.1 L   (13.0-17.5)  gm/dL


 


Hct  37.0 L   (39.0-53.0)  %


 


MCV  119.5 H   (80.0-100.0)  fL


 


MCH  39.0 H   (25.0-35.0)  pg


 


Plt Count  584 H   (150-450)  k/uL


 


Macrocytosis  Marked A   


 


Creatinine   0.59 L  (0.66-1.25)  mg/dL














Assessment and Plan


Plan: 








#Acute pneumonia secondary to Streptococcus pneumonia


#Acute influenza infection


#Acute hypercapnic respiratory failure 


#COPD exacerbation


#Acute respiratory distress secondary to DTs


-Patient completed his antibiotic course. 


-Patient also completed his antiviral course


-Aspiration precautions


-Patient intubated on 05/18/2022 for DTs


-Resume steroids.  Resume breathing treatments


-Patient was extubated continue oxygen as indicated.





#Acute alcohol withdrawal with delirium tremens


-Patient currently intubated and sedated





#Chronic smoker





The patient is admitted with an anticipated greater than 2 midnight stay for 

evaluation of sepsis, influenza


CODE STATUS: Full Code


Anticipated discharge date: Pending clinical progression.


Anticipated discharge place: Home

## 2022-05-25 VITALS — RESPIRATION RATE: 16 BRPM

## 2022-05-25 LAB
GLUCOSE BLD-MCNC: 127 MG/DL (ref 75–99)
GLUCOSE BLD-MCNC: 132 MG/DL (ref 75–99)
GLUCOSE BLD-MCNC: 136 MG/DL (ref 75–99)
GLUCOSE BLD-MCNC: 86 MG/DL (ref 75–99)

## 2022-05-25 RX ADMIN — IPRATROPIUM BROMIDE AND ALBUTEROL SULFATE SCH ML: .5; 3 SOLUTION RESPIRATORY (INHALATION) at 15:10

## 2022-05-25 RX ADMIN — HEPARIN SODIUM SCH UNIT: 5000 INJECTION INTRAVENOUS; SUBCUTANEOUS at 09:55

## 2022-05-25 RX ADMIN — HEPARIN SODIUM SCH UNIT: 5000 INJECTION INTRAVENOUS; SUBCUTANEOUS at 16:59

## 2022-05-25 RX ADMIN — IPRATROPIUM BROMIDE AND ALBUTEROL SULFATE SCH ML: .5; 3 SOLUTION RESPIRATORY (INHALATION) at 10:49

## 2022-05-25 RX ADMIN — IPRATROPIUM BROMIDE AND ALBUTEROL SULFATE SCH ML: .5; 3 SOLUTION RESPIRATORY (INHALATION) at 07:04

## 2022-05-25 RX ADMIN — HEPARIN SODIUM SCH: 5000 INJECTION INTRAVENOUS; SUBCUTANEOUS at 01:04

## 2022-05-25 RX ADMIN — PANTOPRAZOLE SODIUM SCH MG: 40 INJECTION, POWDER, FOR SOLUTION INTRAVENOUS at 09:56

## 2022-05-25 RX ADMIN — IPRATROPIUM BROMIDE AND ALBUTEROL SULFATE SCH ML: .5; 3 SOLUTION RESPIRATORY (INHALATION) at 20:07

## 2022-05-25 NOTE — P.PN
Subjective


Progress Note Date: 05/25/22


Principal diagnosis: 


 Shortness of breath, influenza pneumonia, community acquired pneumonia, EtOH 

withdrawal





 On 05/25/2022 patient seen in follow-up on medical surgical floor.  He was 

successfully weaned and extubated for the second time during this admission the 

day before yesterday on 05/23/2022.  Tolerating extubation quite well so far.  

Breathing comfortably, he is on 3 L of oxygen, pulse ox is 92%, no agitation, no

confusion, he is oriented 3, he is calm and cooperative.  He states he has 

occasional cough, and he has been clearing some clear-colored phlegm.  No hemo

ptysis, no complaints of chest discomfort, no fever or chills, vital signs have 

been stable, he has been ambulating with a walker to the bathroom, tolerating 

ambulation well.  He was transferred out of intensive care unit yesterday, his 

had no acute events overnight, tolerating diet, no nausea vomiting or diarrhea. 

He has completed a course of his antibiotics, completed Tamiflu.  We 

transitioned him to oral prednisone, on sounds are diminished at the bases, with

a few scattered rales, remains on nebulized bronchodilators.  He is on GI and 

DVT prophylaxis, remains on CIWA protocol, however he has not required any 

Ativan for EtOH withdrawal symptoms in the last 24 hours, has not required any 

Haldol either.  Chest x-ray showing underlying COPD, no acute process, interval 

extubation and removal of NG tube








Objective





- Vital Signs


Vital signs: 


                                   Vital Signs











Temp  98.5 F   05/25/22 12:13


 


Pulse  113 H  05/25/22 12:13


 


Resp  18   05/25/22 12:13


 


BP  120/77   05/25/22 12:13


 


Pulse Ox  92 L  05/25/22 12:13


 


FiO2  35   05/23/22 12:00








                                 Intake & Output











 05/24/22 05/25/22 05/25/22





 18:59 06:59 18:59


 


Intake Total 287 0 


 


Output Total 150 1 


 


Balance 137 -1 


 


Weight  65 kg 


 


Intake:   


 


    


 


    Potassium Chloride 10 meq 100  





    In Water For Injection 1   





    100ml.bag @ 100 mls/hr   





    IVPB Q1H [facility] Rx#:   





    [id number]   


 


    Pressure Bag (0.9 Sodium 6  





    Chloride)   


 


    Sodium Chloride 0.9% 1, 160  





    000 ml @ 80 mls/hr IV .   





    [id number] [facility] Rx#:[id number]   


 


  Intake, IV Titration 21 0 





  Amount   


 


    Clevidipine Butyrate 25 21  





    mg In Empty Bag 1 bag @ 2   





    MG/HR 4 mls/hr IV .   





    [id number] [facility]UEL Rx#:[id number]   


 


    Sodium Chloride 0.9% 1,  0 





    000 ml @ 80 mls/hr IV .   





    [id number] [facility]ES[facility]UEL Rx#:[id number]   


 


Output:   


 


  Urine 150 1 


 


Other:   


 


  Voiding Method Urinal Urinal Urinal


 


  # Voids  500 


 


  # Bowel Movements  0 








                       ABP, PAP, CO, CI - Last Documented











Arterial Blood Pressure        155/73

















- Exam


 GENERAL EXAM: Alert, very pleasant, 57-year-old white male on 3 L of oxygen 

pulse ox is 96% comfortable in no apparent distress.


HEAD: Normocephalic/atraumatic.


EYES: Normal reaction of pupils, equal size.  Conjunctiva pink, sclera white.


NOSE: Clear with pink turbinates.


THROAT: No erythema or exudates.


NECK: No masses, no JVD, no thyroid enlargement, no adenopathy.


CHEST: No chest wall deformity.  Symmetrical expansion. 


LUNGS: Equal air entry with no bibasilar crackles


CVS: Regular rate and rhythm, normal S1 and S2, no gallops, no murmurs, no rubs


ABDOMEN: Soft, nontender.  No hepatosplenomegaly, normal bowel sounds, no 

guarding or rigidity.


EXTREMITIES: No clubbing, no edema, no cyanosis, 2+ pulses and upper and lower 

extremities.


MUSCULOSKELETAL: Muscle strength and tone normal.


SPINE: No scoliosis or deformity


SKIN: No rashes


CENTRAL NERVOUS SYSTEM: Alert and oriented -3.  No focal deficits, tone is n

ormal in all 4 extremities.


PSYCHIATRIC: Alert and oriented -3.  Appropriate affect.  Intact judgment and 

insight.











- Labs


CBC & Chem 7: 


                                 05/24/22 05:00





                                 05/24/22 05:00


Labs: 


                  Abnormal Lab Results - Last 24 Hours (Table)











  05/24/22 05/24/22 05/25/22 Range/Units





  17:11 20:24 11:41 


 


POC Glucose (mg/dL)  120 H  106 H  132 H  (75-99)  mg/dL














  05/25/22 Range/Units





  11:41 


 


POC Glucose (mg/dL)  132 H  (75-99)  mg/dL














Assessment and Plan


Plan: 


 Assessment:





#1.  Acute hypoxic and hypercapnic respiratory failure, multifactorial, related 

to acute EtOH withdrawal, community acquired pneumonia, and influenza A 

infection.  Patient was initially intubated on 05/11/2022, and extubated on 

05/13/2022, however required reintubation on 05/20/2022 for worsening hypoxia, 

agitation, and acute EtOH withdrawal, and was again successfully weaned and 

extubated on 05/23/2022





#2.  Acute Streptococcus pneumonie pneumonia, resolved based on the chest x-ray 

findings, was treated with azithromycin and Rocephin initially, and finished a 

course of Levaquin





#3.  Acute alcohol withdrawal and delirium tremens, significantly improved





#4.  Hypovolemic hyponatremia resolved





#5.  Mild lactic acidosis corrected





#6.  COPD





#7.  Chronic smoker





#8.  History of EtOH abuse





#9.  Marijuana use





Plan:





Clinically patient has been tolerating extubation quite well, breathing 

comfortably


Continue weaning FiO2, provide incentive spirometer, deep breathing and coughing


Continue with her oral prednisone, patient has completed his antibiotics


His latest chest x-ray shows resolution of his pneumonia


Continue working on gait training, and rehab


From pulmonary perspective patient may be considered for discharge home in the 

next 24-48 hours if cleared by medicine


Outpatient follow-up with Dr. Evans any office in 7-10 days





I have personally seen and examined the patient, performed the documentation and

the assessment and  plan as written.  Number of minutes spent on the visit: [10]

 








I have personally seen and examined the patient and reviewed the documentation. 

 I performed a joint evaluation with the nurse practitioner in this evaluation 

was done more than 20 minutes.  I fully agree with the documentation above and 

the plan of care.  I have seen this patient in conjunction with the nurse 

practitioner.  The patient is doing well.  The patient was transferred out of 

the intensive care unit.  He is still having some weakness.  Nevertheless is 

improving and the patient may be potentially discharged the next 24-48 hours.  

Continue the prednisone burst taper.  Continue using incentive spirometer.  

Continue supportive care.  We'll continue to follow.


Time with Patient: Less than 30

## 2022-05-25 NOTE — P.PN
Subjective


Progress Note Date: 05/25/22





Doing well today.  Will monitor patient overnight and try to wean oxygen as 

able.





Gen: awake, alert


HEENT: normocephalic, atraumatic, good hearing acuity, moist mucous membranes


Resp: good air exchange, breathing comfortably with no accessory muscle use


CVS: good distal perfusion x 4,


GI: soft, NTTP, ND


: no SPT, no CVAT, mancera catheter not present


MSK: no pitting edema, no clubbing


Neuro: non-focal, moving all extremities


Psych: cooperative, euthymic mood





Assessment/plan:





#Acute pneumonia secondary to Streptococcus pneumonia


#Acute influenza infection


#Acute hypercapnic respiratory failure 


#COPD exacerbation


#Acute respiratory distress secondary to DTs


-Patient completed his antibiotic course. 


-Patient also completed his antiviral course


-Aspiration precautions


-Patient intubated on 05/18/2022 for DTs


-Resume steroids.  Resume breathing treatments


-Patient was extubated continue oxygen as indicated.





#Acute alcohol withdrawal with delirium tremens


-Patient currently intubated and sedated





#Chronic smoker





The patient is admitted with an anticipated greater than 2 midnight stay for 

evaluation of sepsis, influenza


CODE STATUS: Full Code


Anticipated discharge date: Pending clinical progression.


Anticipated discharge place: Home 








Objective





- Vital Signs


Vital signs: 


                                   Vital Signs











Temp  98.5 F   05/25/22 12:13


 


Pulse  113 H  05/25/22 12:13


 


Resp  18   05/25/22 12:13


 


BP  120/77   05/25/22 12:13


 


Pulse Ox  92 L  05/25/22 12:13


 


FiO2  35   05/23/22 12:00








                                 Intake & Output











 05/24/22 05/25/22 05/25/22





 18:59 06:59 18:59


 


Intake Total 287 0 


 


Output Total 150 1 


 


Balance 137 -1 


 


Weight  65 kg 


 


Intake:   


 


    


 


    Potassium Chloride 10 meq 100  





    In Water For Injection 1   





    100ml.bag @ 100 mls/hr   





    IVPB Q1H [facility] Rx#:   





    [id number]   


 


    Pressure Bag (0.9 Sodium 6  





    Chloride)   


 


    Sodium Chloride 0.9% 1, 160  





    000 ml @ 80 mls/hr IV .   





    [id number] [facility] Rx#:[id number]   


 


  Intake, IV Titration 21 0 





  Amount   


 


    Clevidipine Butyrate 25 21  





    mg In Empty Bag 1 bag @ 2   





    MG/HR 4 mls/hr IV .   





    [id number] [facility] Rx#:[id number]   


 


    Sodium Chloride 0.9% 1,  0 





    000 ml @ 80 mls/hr IV .   





    [id number] [facility] Rx#:[id number]   


 


Output:   


 


  Urine 150 1 


 


Other:   


 


  Voiding Method Urinal Urinal Urinal


 


  # Voids  500 


 


  # Bowel Movements  0 








                       ABP, PAP, CO, CI - Last Documented











Arterial Blood Pressure        155/73

















- Labs


CBC & Chem 7: 


                                 05/24/22 05:00





                                 05/24/22 05:00


Labs: 


                  Abnormal Lab Results - Last 24 Hours (Table)











  05/24/22 05/24/22 05/25/22 Range/Units





  17:11 20:24 11:41 


 


POC Glucose (mg/dL)  120 H  106 H  132 H  (75-99)  mg/dL














  05/25/22 Range/Units





  11:41 


 


POC Glucose (mg/dL)  132 H  (75-99)  mg/dL

## 2022-05-26 VITALS — HEART RATE: 98 BPM

## 2022-05-26 VITALS — SYSTOLIC BLOOD PRESSURE: 147 MMHG | DIASTOLIC BLOOD PRESSURE: 89 MMHG | TEMPERATURE: 98.8 F

## 2022-05-26 LAB — GLUCOSE BLD-MCNC: 79 MG/DL (ref 75–99)

## 2022-05-26 RX ADMIN — IPRATROPIUM BROMIDE AND ALBUTEROL SULFATE SCH ML: .5; 3 SOLUTION RESPIRATORY (INHALATION) at 16:45

## 2022-05-26 RX ADMIN — HEPARIN SODIUM SCH: 5000 INJECTION INTRAVENOUS; SUBCUTANEOUS at 01:21

## 2022-05-26 RX ADMIN — IPRATROPIUM BROMIDE AND ALBUTEROL SULFATE SCH: .5; 3 SOLUTION RESPIRATORY (INHALATION) at 16:43

## 2022-05-26 RX ADMIN — HEPARIN SODIUM SCH: 5000 INJECTION INTRAVENOUS; SUBCUTANEOUS at 07:53

## 2022-05-26 RX ADMIN — IPRATROPIUM BROMIDE AND ALBUTEROL SULFATE SCH ML: .5; 3 SOLUTION RESPIRATORY (INHALATION) at 07:19

## 2022-05-26 RX ADMIN — PANTOPRAZOLE SODIUM SCH MG: 40 INJECTION, POWDER, FOR SOLUTION INTRAVENOUS at 07:55

## 2022-05-26 NOTE — P.DS
Providers


Date of admission: 


05/12/22 00:52





Expected date of discharge: 05/26/22


Attending physician: 


Parul Braun MD





Consults: 





                                        





05/12/22 04:02


Consult Physician Routine 


   Consulting Provider: Alber Nielsen


   Consult Reason/Comments: icu


   Do you want consulting provider notified?: Yes





05/20/22 04:48


Consult Physician Routine 


   Consulting Provider: Austin Pope


   Consult Reason/Comments: New bradycardia


   Do you want consulting provider notified?: Yes, Notify in am











Primary care physician: 


Stated None





Hospital Course: 





#Acute pneumonia secondary to Streptococcus pneumonia


#Acute influenza infection


#Acute hypercapnic respiratory failure 


#COPD exacerbation


#Acute respiratory distress secondary to DTs


#Acute alcohol withdrawal with delirium tremens


#Chronic smoker





The patient is a 57-year-old male with a PMH of EtOH abuse and COPD who had 

presented to the emergency room for fever and overall not feeling well.  The 

patient was reportedly denying experiencing chest discomfort or shortness of 

breath.  While in the emergency room, the patient was septic and developed 

delirium tremens.  He received 20 mg of Ativan IV push without significant 

improvement.  He subsequently had a seizure and was intubated for airway 

protection.  Laboratory evaluation revealed magnesium of 0.9, troponin 0.023, 

lactic acid 1.6, and influenza type A positive.  Initial EKG had revealed sinus 

tachycardia with APCs at 151 bpm.  The patient was intubated and sent to the 

ICU.  Thereafter, his hospital course was complicated by prolonged 

encephalopathy from sepsis and ETOH withdrawal.  He was ultimately able to be 

extubated to NC, then downtitrated to 3L NC by the time of discharge.  Pt was 

also treated for streptococcal pneumonia and COPD exacerbation and did complete 

his course of antibiotics while in house, but was discharged with a 12 day 

prednisone taper per pulmonary recommendation due to the severity of his COPD 

exacerbation, and new medications for symbicort scheduled and albuterol prn.  Pt

will f/u with pulmonology and cardiology ni 7-10 days. 





I spent 45 minutes coordinating this complex discharge.





Gen: awake, alert


HEENT: normocephalic, atraumatic, good hearing acuity, moist mucous membranes


Resp: good air exchange, breathing comfortably with no accessory muscle use


CVS: good distal perfusion x 4,


GI: soft, NTTP, ND


: no SPT, no CVAT, mancera catheter not present


MSK: no pitting edema, no clubbing


Neuro: non-focal, moving all extremities


Psych: cooperative, euthymic mood

















Patient Condition at Discharge: Critical





Plan - Discharge Summary


Discharge Rx Participant: No


New Discharge Prescriptions: 


New


   predniSONE 0 mg PO AS DIRECTED 12 Days #24 tab


   Ipratropium-Albuterol Nebulize [Duoneb 0.5 mg-3 mg/3 ml Soln] 3 ml INHALATION

QID 30 Days #6 pack


   predniSONE 0 mg PO AS DIRECTED 12 Days #24 tab


   Budesonide/Formoterol Fumarate [Symbicort 160-4.5 Mcg Inhaler] 2 puff 

INHALATION BID 30 Days #1 each


   Albuterol Inhaler [Ventolin Hfa Inhaler] 2 puff INHALATION RT-QID #8 gm


   Budesonide/Formoterol Fumarate [Symbicort 160-4.5 Mcg Inhaler] 2 puff 

INHALATION BID 30 Days #1 each


Discharge Medication List





Albuterol Inhaler [Ventolin Hfa Inhaler] 2 puff INHALATION RT-QID #8 gm 05/26/22

[Rx]


Budesonide/Formoterol Fumarate [Symbicort 160-4.5 Mcg Inhaler] 2 puff INHALATION

BID 30 Days #1 each 05/26/22 [Rx]


Budesonide/Formoterol Fumarate [Symbicort 160-4.5 Mcg Inhaler] 2 puff INHALATION

BID 30 Days #1 each 05/26/22 [Rx]


Ipratropium-Albuterol Nebulize [Duoneb 0.5 mg-3 mg/3 ml Soln] 3 ml INHALATION 

QID 30 Days #6 pack 05/26/22 [Rx]


predniSONE 0 mg PO AS DIRECTED 12 Days #24 tab 05/26/22 [Rx]


predniSONE 0 mg PO AS DIRECTED 12 Days #24 tab 05/26/22 [Rx]








Follow up Appointment(s)/Referral(s): 


[facility] [place], [NON-STAFF] - 1 Week


None,Stated [Primary Care Provider] - 1-2 days


Evita Arteaga MD [STAFF PHYSICIAN] - 1 Week


Discharge Disposition: HOME SELF-CARE

## 2022-05-26 NOTE — P.PN
Subjective


Progress Note Date: 05/26/22


Principal diagnosis: 


 Shortness of breath, influenza pneumonia, community acquired pneumonia, EtOH 

withdrawal





 On 05/25/2022 patient seen in follow-up on medical surgical floor.  He was 

successfully weaned and extubated for the second time during this admission the 

day before yesterday on 05/23/2022.  Tolerating extubation quite well so far.  

Breathing comfortably, he is on 3 L of oxygen, pulse ox is 92%, no agitation, no

confusion, he is oriented 3, he is calm and cooperative.  He states he has 

occasional cough, and he has been clearing some clear-colored phlegm.  No hemo

ptysis, no complaints of chest discomfort, no fever or chills, vital signs have 

been stable, he has been ambulating with a walker to the bathroom, tolerating 

ambulation well.  He was transferred out of intensive care unit yesterday, his 

had no acute events overnight, tolerating diet, no nausea vomiting or diarrhea. 

He has completed a course of his antibiotics, completed Tamiflu.  We 

transitioned him to oral prednisone, on sounds are diminished at the bases, with

a few scattered rales, remains on nebulized bronchodilators.  He is on GI and 

DVT prophylaxis, remains on CIWA protocol, however he has not required any 

Ativan for EtOH withdrawal symptoms in the last 24 hours, has not required any 

Haldol either.  Chest x-ray showing underlying COPD, no acute process, interval 

extubation and removal of NG tube





On 05/26/2022 patient seen in follow-up on medical surgical floor, he is 

breathing comfortably, vital signs have been stable, no fever or chills, no 

worsening dyspnea or cough, no acute events overnight.  Patient is currently on 

3 L of oxygen pulse ox is 96%, but room air O2 saturations with ambulation were 

reportedly low, and patient will likely qualify for home oxygen.  Patient has 

completed a course of antibiotics for pneumonia related to Streptococcus 

pneumonia, he did complete Tamiflu.  He is on prednisone at 30 mg daily.  IV 

fluids have been hep-locked, he is tolerating inhalation, EKG and hasn't had any

evidence of confusion or agitation, no evidence of active EtOH withdrawal.








Objective





- Vital Signs


Vital signs: 


                                   Vital Signs











Temp  98.8 F   05/26/22 04:20


 


Pulse  94   05/26/22 07:31


 


Resp  16   05/26/22 08:05


 


BP  147/89   05/26/22 04:20


 


Pulse Ox  96   05/26/22 04:20


 


FiO2  35   05/23/22 12:00








                                 Intake & Output











 05/25/22 05/26/22 05/26/22





 18:59 06:59 18:59


 


Intake Total  540 


 


Balance  540 


 


Weight  64.8 kg 


 


Intake:   


 


  Oral  540 


 


Other:   


 


  Voiding Method Urinal Urinal Urinal


 


  # Voids 3  








                       ABP, PAP, CO, CI - Last Documented











Arterial Blood Pressure        155/73

















- Exam


 GENERAL EXAM: Alert, very pleasant, 57-year-old white male on 3 L of oxygen 

pulse ox is 96% comfortable in no apparent distress.


HEAD: Normocephalic/atraumatic.


EYES: Normal reaction of pupils, equal size.  Conjunctiva pink, sclera white.


NOSE: Clear with pink turbinates.


THROAT: No erythema or exudates.


NECK: No masses, no JVD, no thyroid enlargement, no adenopathy.


CHEST: No chest wall deformity.  Symmetrical expansion. 


LUNGS: Equal air entry with no bibasilar crackles


CVS: Regular rate and rhythm, normal S1 and S2, no gallops, no murmurs, no rubs


ABDOMEN: Soft, nontender.  No hepatosplenomegaly, normal bowel sounds, no 

guarding or rigidity.


EXTREMITIES: No clubbing, no edema, no cyanosis, 2+ pulses and upper and lower 

extremities.


MUSCULOSKELETAL: Muscle strength and tone normal.


SPINE: No scoliosis or deformity


SKIN: No rashes


CENTRAL NERVOUS SYSTEM: Alert and oriented -3.  No focal deficits, tone is 

normal in all 4 extremities.


PSYCHIATRIC: Alert and oriented -3.  Appropriate affect.  Intact judgment and 

insight.











- Labs


CBC & Chem 7: 


                                 05/24/22 05:00





                                 05/24/22 05:00


Labs: 


                  Abnormal Lab Results - Last 24 Hours (Table)











  05/25/22 05/25/22 05/25/22 Range/Units





  17:14 17:14 21:02 


 


POC Glucose (mg/dL)  136 H  136 H  127 H  (75-99)  mg/dL














Assessment and Plan


Plan: 


 Assessment:





#1.  Acute hypoxic and hypercapnic respiratory failure, multifactorial, related 

to acute EtOH withdrawal, community acquired pneumonia, and influenza A 

infection.  Patient was initially intubated on 05/11/2022, and extubated on 

05/13/2022, however required reintubation on 05/20/2022 for worsening hypoxia, 

agitation, and acute EtOH withdrawal, and was again successfully weaned and 

extubated on 05/23/2022





#2.  Acute Streptococcus pneumonie pneumonia, resolved based on the chest x-ray 

findings, was treated with azithromycin and Rocephin initially, and finished a 

course of Levaquin





#3.  Acute alcohol withdrawal and delirium tremens, significantly improved





#4.  Hypovolemic hyponatremia resolved





#5.  Mild lactic acidosis corrected





#6.  COPD





#7.  Chronic smoker





#8.  History of EtOH abuse





#9.  Marijuana use





Plan:





Obtain home oxygen assessment and patient will likely qualify for home oxygen


Otherwise clinically has been stable, continues to improve, no worsening 

dyspnea, his latest chest x-ray was showing resolution of his pneumonia


Tolerating ambulation, his had no fever or chills


He completed his antibiotics for community-acquired pneumonia, improving course 

of Tamiflu for influenza A infection


Continue working on gait training, and rehab


From pulmonary perspective patient may be considered for discharge home today


Outpatient follow-up with Dr. Evans any office in 7-10 days





I have personally seen and examined the patient, performed the documentation and

the assessment and  plan as written.  Number of minutes spent on the visit: [10]

 








I have personally seen and examined the patient and reviewed the documentation. 

 I performed a joint evaluation with the nurse practitioner in this evaluation 

was done more than 10 minutes.  I fully agree with the documentation above and 

the plan of care..  The patient is stable.  Insulin for home O2.  Home 

nebulizer.  Symbicort for outpatient maintenance of COPD.  We'll follow.


Time with Patient: Less than 30

## 2023-03-10 NOTE — P.PN
Subjective


Progress Note Date: 05/21/22





Patient remains intubated and sedated.  He is on propofol at 75 mL an hour.





Objective





- Vital Signs


Vital signs: 


                                   Vital Signs











Temp  100.7 F H  05/21/22 07:50


 


Pulse  110 H  05/21/22 10:00


 


Resp  26 H  05/21/22 10:00


 


BP  138/86   05/21/22 07:00


 


Pulse Ox  97   05/21/22 10:00








                                 Intake & Output











 05/20/22 05/21/22 05/21/22





 18:59 06:59 18:59


 


Intake Total 3084.399 7163.6 664.800


 


Output Total 635 1345 1110


 


Balance 865.668 525.6 -445.200


 


Weight 69.8 kg 70.5 kg 


 


Intake:   


 


   960 326


 


    Pressure Bag (0.9 Sodium   6





    Chloride)   


 


    Sodium Chloride 0.9% 1, 960 960 320





    000 ml @ 80 mls/hr IV .   





    [id number] JESUS MANUEL Rx#:[id number]   


 


  Intake, IV Titration 457.668 339.6 160.800





  Amount   


 


    Clevidipine Butyrate 25 0.133 39.6 





    mg In Empty Bag 1 bag @ 2   





    MG/HR 4 mls/hr IV .   





    [id number] JESUS MANUEL Rx#:[id number]   


 


    Levofloxacin 750Mg-D5w 150  





    Pmx 750 mg In Dextrose/   





    Water 1 150ml.bag @ 100   





    mls/hr IVPB Q24H JESUS MANUEL Rx#:   





    [id number]   


 


    propofoL 1,000 mg In 307.535 300 160.800





    Empty Bag 1 bag @ 5 MCG/   





    KG/MIN 2.01 mls/hr IV .   





    Q24H JESUS MANUEL Rx#:[id number]   


 


  Tube Feeding 83 481 148


 


  Other  90 30


 


Output:   


 


  Urine 635 1345 1110


 


Other:   


 


  Voiding Method Indwelling Catheter Indwelling Catheter Indwelling Catheter








                       ABP, PAP, CO, CI - Last Documented











Arterial Blood Pressure        148/69

















- Exam





General examination -intubated and sedated


Heart - + S1S2  no murmurs


Lungs -mild wheezing with rhonchi throughout


Abdomen soft NT ND +ve BS


Extremities - No edema


CNS - unable to assess


Psych - unable to assess





- Labs


CBC & Chem 7: 


                                 05/21/22 05:36





                                 05/21/22 05:36


Labs: 


                  Abnormal Lab Results - Last 24 Hours (Table)











  05/20/22 05/20/22 05/21/22 Range/Units





  11:54 18:40 05:34 


 


WBC     (3.8-10.6)  k/uL


 


RBC     (4.30-5.90)  m/uL


 


Hgb     (13.0-17.5)  gm/dL


 


Hct     (39.0-53.0)  %


 


MCV     (80.0-100.0)  fL


 


MCH     (25.0-35.0)  pg


 


Plt Count     (150-450)  k/uL


 


Neutrophils #     (1.3-7.7)  k/uL


 


Macrocytosis     


 


ABG pH     (7.35-7.45)  


 


ABG pO2     ()  mmHg


 


ABG HCO3     (21-25)  mmol/L


 


ABG Total CO2     (19-24)  mmol/L


 


ABG O2 Saturation     (94-97)  %


 


Glucose     (74-99)  mg/dL


 


POC Glucose (mg/dL)  130 H  112 H  110 H  (75-99)  mg/dL


 


Alkaline Phosphatase     ()  U/L


 


Total Protein     (6.3-8.2)  g/dL


 


Albumin     (3.5-5.0)  g/dL














  05/21/22 05/21/22 05/21/22 Range/Units





  05:36 05:36 06:11 


 


WBC  14.6 H    (3.8-10.6)  k/uL


 


RBC  2.87 L    (4.30-5.90)  m/uL


 


Hgb  11.2 L    (13.0-17.5)  gm/dL


 


Hct  34.6 L    (39.0-53.0)  %


 


MCV  120.6 H    (80.0-100.0)  fL


 


MCH  38.9 H    (25.0-35.0)  pg


 


Plt Count  702 H    (150-450)  k/uL


 


Neutrophils #  11.7 H    (1.3-7.7)  k/uL


 


Macrocytosis  Marked A    


 


ABG pH    7.49 H  (7.35-7.45)  


 


ABG pO2    63 L  ()  mmHg


 


ABG HCO3    30 H  (21-25)  mmol/L


 


ABG Total CO2    31 H  (19-24)  mmol/L


 


ABG O2 Saturation    92.3 L  (94-97)  %


 


Glucose   109 H   (74-99)  mg/dL


 


POC Glucose (mg/dL)     (75-99)  mg/dL


 


Alkaline Phosphatase   281 H   ()  U/L


 


Total Protein   6.2 L   (6.3-8.2)  g/dL


 


Albumin   3.3 L   (3.5-5.0)  g/dL








                      Microbiology - Last 24 Hours (Table)











 05/19/22 23:02 Gram Stain - Preliminary





 Sputum Sputum Culture - Preliminary














Assessment and Plan


Assessment: 





#Acute pneumonia secondary to Streptococcus pneumonia


#Acute influenza infection


#Acute hypercapnic respiratory failure 


#COPD exacerbation


#Acute respiratory distress secondary to DTs


-Continue Levaquin


-Aspiration precautions


-Patient intubated on 05/18/2022 for DTs


-Resume steroids.  Resume breathing treatments


-Wean sedation as tolerated


-ICU management





#Acute alcohol withdrawal with delirium tremens


-Patient currently intubated and sedated





#Chronic smoker





The patient is admitted with an anticipated greater than 2 midnight stay for 

evaluation of sepsis, influenza


CODE STATUS: Full Code


Anticipated discharge date: Depending on clinical course


Anticipated discharge place: [facility]

## 2023-08-09 ENCOUNTER — HOSPITAL ENCOUNTER (EMERGENCY)
Dept: HOSPITAL 47 - EC | Age: 58
Discharge: HOME | End: 2023-08-09
Payer: COMMERCIAL

## 2023-08-09 VITALS — RESPIRATION RATE: 18 BRPM | TEMPERATURE: 98 F | HEART RATE: 65 BPM

## 2023-08-09 VITALS — SYSTOLIC BLOOD PRESSURE: 143 MMHG | DIASTOLIC BLOOD PRESSURE: 116 MMHG
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
## 2023-08-09 DIAGNOSIS — F10.129: ICD-10-CM
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
## 2023-08-09 DIAGNOSIS — J44.9: ICD-10-CM

## 2023-08-09 DIAGNOSIS — F17.200: ICD-10-CM

## 2023-08-09 DIAGNOSIS — S00.93XA: Primary | ICD-10-CM

## 2023-08-09 LAB
ALBUMIN SERPL-MCNC: 4.4 G/DL (ref 3.5–5)
ALP SERPL-CCNC: 102 U/L (ref 38–126)
ALT SERPL-CCNC: 32 U/L (ref 4–49)
ANION GAP SERPL CALC-SCNC: 14 MMOL/L
APTT BLD: 22.1 SEC (ref 22–30)
AST SERPL-CCNC: 49 U/L (ref 17–59)
BASOPHILS # BLD AUTO: 0.1 K/UL (ref 0–0.2)
BASOPHILS NFR BLD AUTO: 1 %
BUN SERPL-SCNC: 9 MG/DL (ref 9–20)
CALCIUM SPEC-MCNC: 9.7 MG/DL (ref 8.4–10.2)
CHLORIDE SERPL-SCNC: 102 MMOL/L (ref 98–107)
CO2 SERPL-SCNC: 20 MMOL/L (ref 22–30)
EOSINOPHIL # BLD AUTO: 0.2 K/UL (ref 0–0.7)
EOSINOPHIL NFR BLD AUTO: 4 %
ERYTHROCYTE [DISTWIDTH] IN BLOOD BY AUTOMATED COUNT: 4.42 M/UL (ref 4.3–5.9)
ERYTHROCYTE [DISTWIDTH] IN BLOOD: 13.1 % (ref 11.5–15.5)
GLUCOSE SERPL-MCNC: 101 MG/DL (ref 74–99)
HCT VFR BLD AUTO: 48 % (ref 39–53)
HGB BLD-MCNC: 16.8 GM/DL (ref 13–17.5)
INR PPP: 0.9 (ref ?–1.2)
LYMPHOCYTES # SPEC AUTO: 1.5 K/UL (ref 1–4.8)
LYMPHOCYTES NFR SPEC AUTO: 23 %
MCH RBC QN AUTO: 38 PG (ref 25–35)
MCHC RBC AUTO-ENTMCNC: 35 G/DL (ref 31–37)
MCV RBC AUTO: 108.6 FL (ref 80–100)
MONOCYTES # BLD AUTO: 0.4 K/UL (ref 0–1)
MONOCYTES NFR BLD AUTO: 6 %
NEUTROPHILS # BLD AUTO: 4.2 K/UL (ref 1.3–7.7)
NEUTROPHILS NFR BLD AUTO: 65 %
PLATELET # BLD AUTO: 183 K/UL (ref 150–450)
POTASSIUM SERPL-SCNC: 3.5 MMOL/L (ref 3.5–5.1)
PROT SERPL-MCNC: 7.7 G/DL (ref 6.3–8.2)
PT BLD: 9.8 SEC (ref 9–12)
SODIUM SERPL-SCNC: 136 MMOL/L (ref 137–145)
WBC # BLD AUTO: 6.5 K/UL (ref 3.8–10.6)

## 2023-08-09 PROCEDURE — 86901 BLOOD TYPING SEROLOGIC RH(D): CPT

## 2023-08-09 PROCEDURE — 85025 COMPLETE CBC W/AUTO DIFF WBC: CPT

## 2023-08-09 PROCEDURE — 85610 PROTHROMBIN TIME: CPT

## 2023-08-09 PROCEDURE — 84484 ASSAY OF TROPONIN QUANT: CPT

## 2023-08-09 PROCEDURE — 72170 X-RAY EXAM OF PELVIS: CPT

## 2023-08-09 PROCEDURE — 74177 CT ABD & PELVIS W/CONTRAST: CPT

## 2023-08-09 PROCEDURE — 96374 THER/PROPH/DIAG INJ IV PUSH: CPT

## 2023-08-09 PROCEDURE — 80306 DRUG TEST PRSMV INSTRMNT: CPT

## 2023-08-09 PROCEDURE — 99285 EMERGENCY DEPT VISIT HI MDM: CPT

## 2023-08-09 PROCEDURE — 85730 THROMBOPLASTIN TIME PARTIAL: CPT

## 2023-08-09 PROCEDURE — 36415 COLL VENOUS BLD VENIPUNCTURE: CPT

## 2023-08-09 PROCEDURE — 71045 X-RAY EXAM CHEST 1 VIEW: CPT

## 2023-08-09 PROCEDURE — 93005 ELECTROCARDIOGRAM TRACING: CPT

## 2023-08-09 PROCEDURE — 80053 COMPREHEN METABOLIC PANEL: CPT

## 2023-08-09 PROCEDURE — 86900 BLOOD TYPING SEROLOGIC ABO: CPT

## 2023-08-09 PROCEDURE — 72125 CT NECK SPINE W/O DYE: CPT

## 2023-08-09 PROCEDURE — 86850 RBC ANTIBODY SCREEN: CPT

## 2023-08-09 PROCEDURE — 80320 DRUG SCREEN QUANTALCOHOLS: CPT

## 2023-08-09 PROCEDURE — 73080 X-RAY EXAM OF ELBOW: CPT

## 2023-08-09 PROCEDURE — 71260 CT THORAX DX C+: CPT

## 2023-08-09 PROCEDURE — 70450 CT HEAD/BRAIN W/O DYE: CPT
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
## 2023-08-14 ENCOUNTER — HOSPITAL ENCOUNTER (EMERGENCY)
Dept: HOSPITAL 47 - EC | Age: 58
LOS: 1 days | Discharge: HOME | End: 2023-08-15
Payer: COMMERCIAL

## 2023-08-14 DIAGNOSIS — I10: Primary | ICD-10-CM

## 2023-08-14 DIAGNOSIS — J44.9: ICD-10-CM

## 2023-08-14 DIAGNOSIS — F17.200: ICD-10-CM

## 2023-08-14 PROCEDURE — 80053 COMPREHEN METABOLIC PANEL: CPT

## 2023-08-14 PROCEDURE — 96374 THER/PROPH/DIAG INJ IV PUSH: CPT

## 2023-08-14 PROCEDURE — 99283 EMERGENCY DEPT VISIT LOW MDM: CPT

## 2023-08-14 PROCEDURE — 83735 ASSAY OF MAGNESIUM: CPT

## 2023-08-14 PROCEDURE — 96375 TX/PRO/DX INJ NEW DRUG ADDON: CPT

## 2023-08-14 PROCEDURE — 85025 COMPLETE CBC W/AUTO DIFF WBC: CPT

## 2023-08-14 PROCEDURE — 36415 COLL VENOUS BLD VENIPUNCTURE: CPT

## 2023-08-15 VITALS — DIASTOLIC BLOOD PRESSURE: 107 MMHG | SYSTOLIC BLOOD PRESSURE: 142 MMHG | RESPIRATION RATE: 18 BRPM | HEART RATE: 89 BPM

## 2023-08-15 VITALS — TEMPERATURE: 98 F

## 2023-08-15 LAB
ALBUMIN SERPL-MCNC: 4.3 G/DL (ref 3.5–5)
ALP SERPL-CCNC: 105 U/L (ref 38–126)
ALT SERPL-CCNC: 28 U/L (ref 4–49)
ANION GAP SERPL CALC-SCNC: 9 MMOL/L
AST SERPL-CCNC: 50 U/L (ref 17–59)
BASOPHILS # BLD AUTO: 0.1 K/UL (ref 0–0.2)
BASOPHILS NFR BLD AUTO: 1 %
BUN SERPL-SCNC: 15 MG/DL (ref 9–20)
CALCIUM SPEC-MCNC: 9.7 MG/DL (ref 8.4–10.2)
CHLORIDE SERPL-SCNC: 98 MMOL/L (ref 98–107)
CO2 SERPL-SCNC: 27 MMOL/L (ref 22–30)
EOSINOPHIL # BLD AUTO: 0.2 K/UL (ref 0–0.7)
EOSINOPHIL NFR BLD AUTO: 3 %
ERYTHROCYTE [DISTWIDTH] IN BLOOD BY AUTOMATED COUNT: 4.32 M/UL (ref 4.3–5.9)
ERYTHROCYTE [DISTWIDTH] IN BLOOD: 12.9 % (ref 11.5–15.5)
GLUCOSE SERPL-MCNC: 78 MG/DL (ref 74–99)
HCT VFR BLD AUTO: 46.7 % (ref 39–53)
HGB BLD-MCNC: 16.5 GM/DL (ref 13–17.5)
LYMPHOCYTES # SPEC AUTO: 1.2 K/UL (ref 1–4.8)
LYMPHOCYTES NFR SPEC AUTO: 23 %
MAGNESIUM SPEC-SCNC: 1.6 MG/DL (ref 1.6–2.3)
MCH RBC QN AUTO: 38.3 PG (ref 25–35)
MCHC RBC AUTO-ENTMCNC: 35.4 G/DL (ref 31–37)
MCV RBC AUTO: 108.2 FL (ref 80–100)
MONOCYTES # BLD AUTO: 0.5 K/UL (ref 0–1)
MONOCYTES NFR BLD AUTO: 10 %
NEUTROPHILS # BLD AUTO: 3.2 K/UL (ref 1.3–7.7)
NEUTROPHILS NFR BLD AUTO: 60 %
PLATELET # BLD AUTO: 204 K/UL (ref 150–450)
POTASSIUM SERPL-SCNC: 3.8 MMOL/L (ref 3.5–5.1)
PROT SERPL-MCNC: 7.9 G/DL (ref 6.3–8.2)
SODIUM SERPL-SCNC: 134 MMOL/L (ref 137–145)
WBC # BLD AUTO: 5.3 K/UL (ref 3.8–10.6)
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
## 2025-06-03 ENCOUNTER — HOSPITAL ENCOUNTER (EMERGENCY)
Dept: HOSPITAL 47 - EC | Age: 60
Discharge: HOME | End: 2025-06-03
Payer: COMMERCIAL

## 2025-06-03 VITALS — RESPIRATION RATE: 18 BRPM

## 2025-06-03 VITALS — HEART RATE: 94 BPM | SYSTOLIC BLOOD PRESSURE: 122 MMHG | TEMPERATURE: 98.5 F | DIASTOLIC BLOOD PRESSURE: 91 MMHG

## 2025-06-03 DIAGNOSIS — E83.42: Primary | ICD-10-CM

## 2025-06-03 DIAGNOSIS — F17.200: ICD-10-CM

## 2025-06-03 DIAGNOSIS — E87.6: ICD-10-CM

## 2025-06-03 DIAGNOSIS — R06.02: ICD-10-CM

## 2025-06-03 LAB
ALBUMIN SERPL-MCNC: 4.2 G/DL (ref 3.5–5)
ALP SERPL-CCNC: 140 U/L (ref 38–126)
ALT SERPL-CCNC: 27 U/L (ref 4–49)
ANION GAP SERPL CALC-SCNC: 12 MMOL/L
ANISOCYTOSIS BLD QL SMEAR: (no result)
APTT BLD: 23.6 SEC (ref 22–30)
AST SERPL-CCNC: 72 U/L (ref 17–59)
BASO STIPL BLD QL SMEAR: (no result)
BASOPHILS # BLD AUTO: 0.03 10*3/UL (ref 0–0.1)
BASOPHILS NFR BLD AUTO: 0.6 %
BILIRUB BLD-MCNC: 0.6 MG/DL (ref 0.2–1.3)
BUN SERPL-SCNC: 6 MG/DL (ref 9–20)
BURR CELLS BLD QL SMEAR: (no result)
CALCIUM SPEC-MCNC: 8.9 MG/DL (ref 8.4–10.2)
CHLORIDE SERPL-SCNC: 102 MMOL/L (ref 98–107)
CO2 SERPL-SCNC: 25 MMOL/L (ref 22–30)
CREATININE: 0.73 MG/DL (ref 0.66–1.25)
DACRYOCYTES BLD QL SMEAR: (no result)
DOHLE BOD BLD QL SMEAR: (no result)
EOSINOPHIL # BLD AUTO: 0.03 10*3/UL (ref 0.04–0.35)
EOSINOPHIL NFR BLD AUTO: 0.6 %
ERYTHROCYTE [DISTWIDTH] IN BLOOD BY AUTOMATED COUNT: 3.15 10*6/UL (ref 4.4–5.6)
ERYTHROCYTE [DISTWIDTH] IN BLOOD: 16.4 % (ref 11.5–14.5)
GLUCOSE SERPL-MCNC: 119 MG/DL (ref 74–99)
HCT VFR BLD AUTO: 32.8 % (ref 39.6–50)
HGB BLD-MCNC: 11.8 G/DL (ref 13–17)
HOWELL-JOLLY BOD BLD QL SMEAR: (no result)
HYPOCHROMIA BLD QL SMEAR: (no result)
IMM GRANULOCYTES # BLD: 0.01 10*3/UL (ref 0–0.04)
INR PPP: 1.1 (ref ?–1.2)
LG PLATELETS BLD QL SMEAR: (no result)
LYMPHOCYTES # SPEC AUTO: 1.06 10*3/UL (ref 0.9–5)
LYMPHOCYTES NFR SPEC AUTO: 20.1 %
Lab: (no result)
MAGNESIUM SPEC-SCNC: 1.1 MG/DL (ref 1.6–2.3)
MCH RBC QN AUTO: 37.5 PG (ref 27–32)
MCHC RBC AUTO-ENTMCNC: 36 G/DL (ref 32–37)
MCV RBC AUTO: 104.1 FL (ref 80–97)
MONOCYTES # BLD AUTO: 0.21 10*3/UL (ref 0.2–1)
MONOCYTES NFR BLD AUTO: 4 %
NEUTROPHILS # BLD AUTO: 3.94 10*3/UL (ref 1.8–7.7)
NEUTROPHILS NFR BLD AUTO: 74.5 %
NEUTS HYPERSEG # BLD: (no result) 10*3/UL
NRBC BLD AUTO-RTO: (no result) %
NT-PROBNP SERPL-MCNC: 145 PG/ML
OVALOCYTES BLD QL SMEAR: (no result)
PELGER HUET CELLS BLD QL SMEAR: (no result)
PLATELET # BLD AUTO: 512 10*3/UL (ref 140–440)
PLATELETS.RETICULATED NFR BLD AUTO: (no result) %
PMV BLD AUTO: 9.3 FL (ref 9.5–12.2)
POIKILOCYTOSIS BLD QL SMEAR: (no result)
POIKILOCYTOSIS BLD QL SMEAR: (no result)
POLYCHROMASIA BLD QL SMEAR: (no result)
POTASSIUM SERPL-SCNC: 3.1 MMOL/L (ref 3.5–5.1)
PROT SERPL-MCNC: 7 G/DL (ref 6.3–8.2)
PT BLD: 12.2 SEC (ref 10–12.5)
RBC MORPH BLD: (no result)
ROULEAUX BLD QL SMEAR: (no result)
SCHISTOCYTES BLD QL SMEAR: (no result)
SICKLE CELLS BLD QL SMEAR: (no result)
SODIUM SERPL-SCNC: 139 MMOL/L (ref 137–145)
SPHEROCYTES BLD QL SMEAR: (no result)
STOMATOCYTES BLD QL SMEAR: (no result)
TARGETS BLD QL SMEAR: (no result)
TOXIC GRANULES BLD QL SMEAR: (no result)
VARIANT LYMPHS BLD QL SMEAR: (no result)
WBC # BLD AUTO: 5.28 10*3/UL (ref 4.5–10)
WBC NRBC COR # BLD: (no result) K/UL
WBC TOXIC VACUOLES BLD QL SMEAR: (no result)

## 2025-06-03 PROCEDURE — 83605 ASSAY OF LACTIC ACID: CPT

## 2025-06-03 PROCEDURE — 96366 THER/PROPH/DIAG IV INF ADDON: CPT

## 2025-06-03 PROCEDURE — 84484 ASSAY OF TROPONIN QUANT: CPT

## 2025-06-03 PROCEDURE — 83735 ASSAY OF MAGNESIUM: CPT

## 2025-06-03 PROCEDURE — 85730 THROMBOPLASTIN TIME PARTIAL: CPT

## 2025-06-03 PROCEDURE — 80053 COMPREHEN METABOLIC PANEL: CPT

## 2025-06-03 PROCEDURE — 99285 EMERGENCY DEPT VISIT HI MDM: CPT

## 2025-06-03 PROCEDURE — 36415 COLL VENOUS BLD VENIPUNCTURE: CPT

## 2025-06-03 PROCEDURE — 96365 THER/PROPH/DIAG IV INF INIT: CPT

## 2025-06-03 PROCEDURE — 93005 ELECTROCARDIOGRAM TRACING: CPT

## 2025-06-03 PROCEDURE — 96361 HYDRATE IV INFUSION ADD-ON: CPT

## 2025-06-03 PROCEDURE — 96372 THER/PROPH/DIAG INJ SC/IM: CPT

## 2025-06-03 PROCEDURE — 83880 ASSAY OF NATRIURETIC PEPTIDE: CPT

## 2025-06-03 PROCEDURE — 85610 PROTHROMBIN TIME: CPT

## 2025-06-03 PROCEDURE — 71045 X-RAY EXAM CHEST 1 VIEW: CPT

## 2025-06-03 PROCEDURE — 94640 AIRWAY INHALATION TREATMENT: CPT

## 2025-06-03 PROCEDURE — 85025 COMPLETE CBC W/AUTO DIFF WBC: CPT

## 2025-06-03 RX ADMIN — POTASSIUM BICARBONATE ONE MEQ: 782 TABLET, EFFERVESCENT ORAL at 18:22

## 2025-06-03 RX ADMIN — IPRATROPIUM BROMIDE AND ALBUTEROL SULFATE STA ML: .5; 3 SOLUTION RESPIRATORY (INHALATION) at 18:14

## 2025-06-03 RX ADMIN — Medication STA MG: at 19:02

## 2025-06-03 RX ADMIN — DEXTROSE STA MG: 50 INJECTION, SOLUTION INTRAVENOUS at 15:18

## 2025-06-03 RX ADMIN — CEFAZOLIN ONE MLS/HR: 330 INJECTION, POWDER, FOR SOLUTION INTRAMUSCULAR; INTRAVENOUS at 16:43

## 2025-06-03 RX ADMIN — IPRATROPIUM BROMIDE AND ALBUTEROL SULFATE STA ML: .5; 3 SOLUTION RESPIRATORY (INHALATION) at 15:25

## 2025-06-03 RX ADMIN — Medication STA MG: at 18:22

## 2025-06-03 RX ADMIN — DIAZEPAM STA: 5 INJECTION, SOLUTION INTRAMUSCULAR; INTRAVENOUS at 18:24

## 2025-06-03 RX ADMIN — MAGNESIUM SULFATE IN DEXTROSE ONE MLS/HR: 10 INJECTION, SOLUTION INTRAVENOUS at 18:24

## 2025-06-03 RX ADMIN — POTASSIUM BICARBONATE ONE MEQ: 782 TABLET, EFFERVESCENT ORAL at 19:03

## 2025-06-08 ENCOUNTER — HOSPITAL ENCOUNTER (OUTPATIENT)
Dept: HOSPITAL 47 - RADMRIMAIN | Age: 60
Discharge: HOME | End: 2025-06-08
Attending: INTERNAL MEDICINE
Payer: COMMERCIAL

## 2025-06-08 DIAGNOSIS — R77.2: ICD-10-CM

## 2025-06-08 DIAGNOSIS — K76.0: Primary | ICD-10-CM

## 2025-06-08 DIAGNOSIS — K80.20: ICD-10-CM

## 2025-06-08 PROCEDURE — 74183 MRI ABD W/O CNTR FLWD CNTR: CPT

## 2025-06-30 ENCOUNTER — HOSPITAL ENCOUNTER (EMERGENCY)
Dept: HOSPITAL 47 - EC | Age: 60
Discharge: LEFT BEFORE DISCHARGE | End: 2025-06-30
Payer: COMMERCIAL

## 2025-06-30 VITALS
RESPIRATION RATE: 16 BRPM | DIASTOLIC BLOOD PRESSURE: 76 MMHG | SYSTOLIC BLOOD PRESSURE: 108 MMHG | HEART RATE: 50 BPM | TEMPERATURE: 98.4 F

## 2025-06-30 DIAGNOSIS — Z53.29: ICD-10-CM

## 2025-06-30 DIAGNOSIS — R42: Primary | ICD-10-CM

## 2025-06-30 DIAGNOSIS — F17.200: ICD-10-CM

## 2025-06-30 LAB
ALBUMIN SERPL-MCNC: 4.5 G/DL (ref 3.5–5)
ALP SERPL-CCNC: 138 U/L (ref 38–126)
ALT SERPL-CCNC: 18 U/L (ref 4–49)
ANION GAP SERPL CALC-SCNC: 12 MMOL/L
ANISOCYTOSIS BLD QL SMEAR: (no result)
AST SERPL-CCNC: 58 U/L (ref 17–59)
BASO STIPL BLD QL SMEAR: (no result)
BASOPHILS # BLD AUTO: 0.04 10*3/UL (ref 0–0.1)
BASOPHILS NFR BLD AUTO: 0.7 %
BILIRUB BLD-MCNC: 1.5 MG/DL (ref 0.2–1.3)
BUN SERPL-SCNC: 7 MG/DL (ref 9–20)
BURR CELLS BLD QL SMEAR: (no result)
CALCIUM SPEC-MCNC: 8.7 MG/DL (ref 8.4–10.2)
CHLORIDE SERPL-SCNC: 100 MMOL/L (ref 98–107)
CO2 SERPL-SCNC: 24 MMOL/L (ref 22–30)
CREATININE: 0.82 MG/DL (ref 0.66–1.25)
DACRYOCYTES BLD QL SMEAR: (no result)
DOHLE BOD BLD QL SMEAR: (no result)
EOSINOPHIL # BLD AUTO: 0.11 10*3/UL (ref 0.04–0.35)
EOSINOPHIL NFR BLD AUTO: 1.8 %
ERYTHROCYTE [DISTWIDTH] IN BLOOD BY AUTOMATED COUNT: 2.73 10*6/UL (ref 4.4–5.6)
ERYTHROCYTE [DISTWIDTH] IN BLOOD: 19 % (ref 11.5–14.5)
GLUCOSE SERPL-MCNC: 99 MG/DL (ref 74–99)
HCT VFR BLD AUTO: 27.3 % (ref 39.6–50)
HGB BLD-MCNC: 10.1 G/DL (ref 13–17)
HOWELL-JOLLY BOD BLD QL SMEAR: (no result)
HYPOCHROMIA BLD QL SMEAR: (no result)
IMM GRANULOCYTES # BLD: 0.13 10*3/UL (ref 0–0.04)
INR PPP: 1.1 (ref ?–1.2)
LG PLATELETS BLD QL SMEAR: (no result)
LYMPHOCYTES # SPEC AUTO: 1.99 10*3/UL (ref 0.9–5)
LYMPHOCYTES NFR SPEC AUTO: 32.4 %
Lab: (no result)
MAGNESIUM SPEC-SCNC: 1.1 MG/DL (ref 1.6–2.3)
MCH RBC QN AUTO: 37 PG (ref 27–32)
MCHC RBC AUTO-ENTMCNC: 37 G/DL (ref 32–37)
MCV RBC AUTO: 100 FL (ref 80–97)
MONOCYTES # BLD AUTO: 0.74 10*3/UL (ref 0.2–1)
MONOCYTES NFR BLD AUTO: 12 %
NEUTROPHILS # BLD AUTO: 3.14 10*3/UL (ref 1.8–7.7)
NEUTROPHILS NFR BLD AUTO: 51 %
NEUTS HYPERSEG # BLD: (no result) 10*3/UL
NRBC BLD AUTO-RTO: (no result) %
OVALOCYTES BLD QL SMEAR: (no result)
PELGER HUET CELLS BLD QL SMEAR: (no result)
PLATELET # BLD AUTO: 160 10*3/UL (ref 140–440)
PLATELETS.RETICULATED NFR BLD AUTO: (no result) %
PMV BLD AUTO: 10.9 FL (ref 9.5–12.2)
POIKILOCYTOSIS BLD QL SMEAR: (no result)
POIKILOCYTOSIS BLD QL SMEAR: (no result)
POLYCHROMASIA BLD QL SMEAR: (no result)
POTASSIUM SERPL-SCNC: 4.1 MMOL/L (ref 3.5–5.1)
PROT SERPL-MCNC: 7.2 G/DL (ref 6.3–8.2)
PT BLD: 11.8 SEC (ref 10–12.5)
RBC MORPH BLD: (no result)
ROULEAUX BLD QL SMEAR: (no result)
SCHISTOCYTES BLD QL SMEAR: (no result)
SICKLE CELLS BLD QL SMEAR: (no result)
SODIUM SERPL-SCNC: 136 MMOL/L (ref 137–145)
SPHEROCYTES BLD QL SMEAR: (no result)
STOMATOCYTES BLD QL SMEAR: (no result)
TARGETS BLD QL SMEAR: (no result)
TOXIC GRANULES BLD QL SMEAR: (no result)
VARIANT LYMPHS BLD QL SMEAR: (no result)
WBC # BLD AUTO: 6.15 10*3/UL (ref 4.5–10)
WBC NRBC COR # BLD: (no result) K/UL
WBC TOXIC VACUOLES BLD QL SMEAR: (no result)

## 2025-06-30 PROCEDURE — 85610 PROTHROMBIN TIME: CPT

## 2025-06-30 PROCEDURE — 99284 EMERGENCY DEPT VISIT MOD MDM: CPT

## 2025-06-30 PROCEDURE — 71046 X-RAY EXAM CHEST 2 VIEWS: CPT

## 2025-06-30 PROCEDURE — 93005 ELECTROCARDIOGRAM TRACING: CPT

## 2025-06-30 PROCEDURE — 83605 ASSAY OF LACTIC ACID: CPT

## 2025-06-30 PROCEDURE — 80053 COMPREHEN METABOLIC PANEL: CPT

## 2025-06-30 PROCEDURE — 36415 COLL VENOUS BLD VENIPUNCTURE: CPT

## 2025-06-30 PROCEDURE — 84484 ASSAY OF TROPONIN QUANT: CPT

## 2025-06-30 PROCEDURE — 85025 COMPLETE CBC W/AUTO DIFF WBC: CPT

## 2025-06-30 PROCEDURE — 83735 ASSAY OF MAGNESIUM: CPT
